# Patient Record
Sex: FEMALE | Race: WHITE | NOT HISPANIC OR LATINO | Employment: OTHER | URBAN - METROPOLITAN AREA
[De-identification: names, ages, dates, MRNs, and addresses within clinical notes are randomized per-mention and may not be internally consistent; named-entity substitution may affect disease eponyms.]

---

## 2017-01-10 RX ORDER — ESTRADIOL 2 MG/1
TABLET ORAL
Qty: 90 TABLET | Refills: 3 | Status: SHIPPED | OUTPATIENT
Start: 2017-01-10 | End: 2018-05-22 | Stop reason: SDUPTHER

## 2017-01-20 ENCOUNTER — PATIENT MESSAGE (OUTPATIENT)
Dept: ELECTROPHYSIOLOGY | Facility: CLINIC | Age: 67
End: 2017-01-20

## 2017-01-24 ENCOUNTER — OFFICE VISIT (OUTPATIENT)
Dept: ELECTROPHYSIOLOGY | Facility: CLINIC | Age: 67
End: 2017-01-24
Payer: MEDICARE

## 2017-01-24 VITALS
HEART RATE: 60 BPM | SYSTOLIC BLOOD PRESSURE: 108 MMHG | DIASTOLIC BLOOD PRESSURE: 72 MMHG | BODY MASS INDEX: 29.64 KG/M2 | HEIGHT: 68 IN | WEIGHT: 195.56 LBS

## 2017-01-24 DIAGNOSIS — R00.2 PALPITATIONS: ICD-10-CM

## 2017-01-24 DIAGNOSIS — I47.10 SVT (SUPRAVENTRICULAR TACHYCARDIA): Primary | ICD-10-CM

## 2017-01-24 PROCEDURE — 93010 ELECTROCARDIOGRAM REPORT: CPT | Mod: ,,, | Performed by: INTERNAL MEDICINE

## 2017-01-24 PROCEDURE — 99213 OFFICE O/P EST LOW 20 MIN: CPT | Mod: PBBFAC | Performed by: INTERNAL MEDICINE

## 2017-01-24 PROCEDURE — 99999 PR PBB SHADOW E&M-EST. PATIENT-LVL III: CPT | Mod: PBBFAC,,, | Performed by: INTERNAL MEDICINE

## 2017-01-24 PROCEDURE — 93005 ELECTROCARDIOGRAM TRACING: CPT | Mod: PBBFAC | Performed by: INTERNAL MEDICINE

## 2017-01-24 PROCEDURE — 99214 OFFICE O/P EST MOD 30 MIN: CPT | Mod: S$PBB,,, | Performed by: INTERNAL MEDICINE

## 2017-01-24 RX ORDER — ATENOLOL 50 MG/1
50 TABLET ORAL 2 TIMES DAILY
Qty: 120 TABLET | Refills: 3 | Status: SHIPPED | OUTPATIENT
Start: 2017-01-24 | End: 2018-02-20 | Stop reason: SDUPTHER

## 2017-01-24 NOTE — MR AVS SNAPSHOT
[facility]  [address]  [facility] [address]  Phone: [phone]  Fax: [phone]                  Viv De La Torre   2017 2:20 PM   Office Visit    Description:  Female : 1950   Provider:  Blaine Driver MD   Department:  [facility]           Reason for Visit     SVT           Diagnoses this Visit        Comments    SVT (supraventricular tachycardia)    -  Primary     Palpitations                To Do List           Goals (5 Years of Data)     None      Follow-Up and Disposition     Return in about 6 months (around 2017).    Follow-up and Disposition History       These Medications        Disp Refills Start End    atenolol (TENORMIN) 50 MG tablet 120 tablet 3 2017    Take 1 tablet (50 mg total) by mouth 2 (two) times daily. - Oral    Pharmacy: [address] #: [phone]         [facility] On Call     [facility] On Call Nurse Care Line -  Assistance  Registered nurses in the [facility] On Call Center provide clinical advisement, health education, appointment booking, and other advisory services.  Call for this free service at [phone].             Medications           Message regarding Medications     Verify the changes and/or additions to your medication regime listed below are the same as discussed with your clinician today.  If any of these changes or additions are incorrect, please notify your healthcare provider.        START taking these NEW medications        Refills    atenolol (TENORMIN) 50 MG tablet 3    Sig: Take 1 tablet (50 mg total) by mouth 2 (two) times daily.    Class: Normal    Route: Oral      STOP taking these medications     metoprolol tartrate (LOPRESSOR) 25 MG tablet Take 1 tablet (25 mg total) by mouth 2 (two) times daily.           Verify that the below list of medications is an accurate representation of the medications you are currently  "taking.  If none reported, the list may be blank. If incorrect, please contact your healthcare provider. Carry this list with you in case of emergency.           Current Medications     albuterol (PROAIR HFA) 90 mcg/actuation inhaler Inhale 2 puffs into the lungs every 6 (six) hours as needed for Wheezing.    azelastine (ASTELIN) 137 mcg (0.1 %) nasal spray 2 sprays (274 mcg total) by Nasal route 2 (two) times daily. 1 Aerosol, Spray Nasal Every day    cetirizine (ZYRTEC) 10 MG tablet Take 10 mg by mouth once daily.    diphenhydrAMINE-zinc acetate 2-0.1% (BENADRYL) cream Apply topically 3 (three) times daily as needed for Itching.    estradiol (ESTRACE) 2 MG tablet TAKE ONE TABLET BY MOUTH ONCE DAILY    levothyroxine (SYNTHROID) 25 MCG tablet Take 1 tablet (25 mcg total) by mouth before breakfast.    mupirocin (BACTROBAN) 2 % ointment Apply topically 3 (three) times daily.    pravastatin (PRAVACHOL) 40 MG tablet Take 1 tablet (40 mg total) by mouth once daily.    sertraline (ZOLOFT) 100 MG tablet Take 1 tablet (100 mg total) by mouth once daily. 1 Tablet Oral Every day    tretinoin microspheres (RETIN-A MICRO) 0.04 % gel Gel Topical Every day    atenolol (TENORMIN) 50 MG tablet Take 1 tablet (50 mg total) by mouth 2 (two) times daily.           Clinical Reference Information           Vital Signs - Last Recorded  Most recent update: 1/24/2017  2:29 PM by Jose Wilson MA    BP Pulse Ht Wt BMI    108/72 60 5' 8" (1.727 m) 88.7 kg (195 lb 8.8 oz) 29.73 kg/m2      Blood Pressure          Most Recent Value    BP  108/72      Allergies as of 1/24/2017     Penicillins      Immunizations Administered on Date of Encounter - 1/24/2017     None      "

## 2017-01-24 NOTE — PROGRESS NOTES
"Subjective:    Patient ID:  Viv De La Torre is a 67 y.o. female who presents for follow-up of SVT      HPI 66 yo female with SVT.  She is an organic ,  Reports history of intermittent palpitations, over the past 20 years. Associated with chest and back tightness. Generally would last 10-15 minutes. Can often break it with vagal maneuvers.   Presented to [facility] with sustained symptoms lasting 30 minutes. Symptoms resolved prior to being evaluated.  Spect stress 10/28/16 negative.  Echo 10/28/16 normal biventricular structure and function.  Had sustained symptoms >> event monitor reveals SVT at 180 bpm. This one was associated with dizziness and diaphoresis. Was in her car with her granddaughter, had to pull over.  We discussed options, she preferred medical therapy >> placed on lopressor 25 mg BID.  Had Influenza virus over the holidays.    Having breakthrough on the lopressor.  Not as severe, but having 4-5 times a day.  "They will stop me."      Review of Systems   Constitution: Negative. Negative for weakness and malaise/fatigue.   Cardiovascular: Positive for palpitations. Negative for chest pain, dyspnea on exertion, irregular heartbeat, leg swelling, near-syncope, orthopnea, paroxysmal nocturnal dyspnea and syncope.   Respiratory: Negative for cough and shortness of breath.    Neurological: Negative for dizziness and light-headedness.   All other systems reviewed and are negative.       Objective:    Physical Exam   Constitutional: She is oriented to person, place, and time. She appears well-developed and well-nourished.   Eyes: Conjunctivae are normal. No scleral icterus.   Neck: No JVD present. No tracheal deviation present.   Cardiovascular: Normal rate and regular rhythm.  PMI is not displaced.    Pulmonary/Chest: Effort normal and breath sounds normal. No respiratory distress.   Abdominal: Soft. There is no hepatosplenomegaly. There is no tenderness.   Musculoskeletal: She exhibits no edema or " tenderness.   Neurological: She is alert and oriented to person, place, and time.   Skin: Skin is warm and dry. No rash noted.   Psychiatric: She has a normal mood and affect. Her behavior is normal.         Assessment:       1. SVT (supraventricular tachycardia)    2. Palpitations         Plan:           Paroxysmal SVT, having breakthrough on lopressor 25 mg BID.  Limited by low blood pressure.  Discussed options, prefers to defer RFA.  Switch to Atenolol 50 mg BID.  If continues to have events, she will contact us >> switch to Cardizem.  If this does not work, return to clinic to discuss RFA vs anti-arrhythmic therapy.  I indicated I would endorse RFA.  F/u in 6 months.

## 2017-01-25 DIAGNOSIS — R00.2 PALPITATIONS: Primary | ICD-10-CM

## 2017-01-30 ENCOUNTER — PATIENT MESSAGE (OUTPATIENT)
Dept: PODIATRY | Facility: CLINIC | Age: 67
End: 2017-01-30

## 2017-01-31 ENCOUNTER — OFFICE VISIT (OUTPATIENT)
Dept: PODIATRY | Facility: CLINIC | Age: 67
End: 2017-01-31
Payer: MEDICARE

## 2017-01-31 ENCOUNTER — TELEPHONE (OUTPATIENT)
Dept: PODIATRY | Facility: CLINIC | Age: 67
End: 2017-01-31

## 2017-01-31 VITALS — HEIGHT: 68 IN | BODY MASS INDEX: 29.86 KG/M2 | WEIGHT: 197.06 LBS

## 2017-01-31 DIAGNOSIS — D36.10 NEUROMA: Primary | ICD-10-CM

## 2017-01-31 PROCEDURE — 64455 NJX AA&/STRD PLTR COM DG NRV: CPT | Mod: PBBFAC,PO

## 2017-01-31 PROCEDURE — 99999 PR PBB SHADOW E&M-EST. PATIENT-LVL II: CPT | Mod: PBBFAC,,,

## 2017-01-31 PROCEDURE — 99213 OFFICE O/P EST LOW 20 MIN: CPT | Mod: S$PBB,25,,

## 2017-01-31 PROCEDURE — 64455 NJX AA&/STRD PLTR COM DG NRV: CPT | Mod: S$PBB,,,

## 2017-01-31 PROCEDURE — 99212 OFFICE O/P EST SF 10 MIN: CPT | Mod: PBBFAC,PO

## 2017-01-31 RX ORDER — DEXAMETHASONE SODIUM PHOSPHATE 4 MG/ML
4 INJECTION, SOLUTION INTRA-ARTICULAR; INTRALESIONAL; INTRAMUSCULAR; INTRAVENOUS; SOFT TISSUE
Status: COMPLETED | OUTPATIENT
Start: 2017-01-31 | End: 2017-01-31

## 2017-01-31 RX ORDER — SERTRALINE HYDROCHLORIDE 100 MG/1
100 TABLET, FILM COATED ORAL DAILY
Qty: 30 TABLET | Refills: 12 | Status: SHIPPED | OUTPATIENT
Start: 2017-01-31 | End: 2019-11-05 | Stop reason: SDUPTHER

## 2017-01-31 RX ORDER — TRIAMCINOLONE ACETONIDE 40 MG/ML
12 INJECTION, SUSPENSION INTRA-ARTICULAR; INTRAMUSCULAR
Status: COMPLETED | OUTPATIENT
Start: 2017-01-31 | End: 2017-01-31

## 2017-01-31 RX ADMIN — TRIAMCINOLONE ACETONIDE 12 MG: 40 INJECTION, SUSPENSION INTRA-ARTICULAR; INTRAMUSCULAR at 03:01

## 2017-01-31 RX ADMIN — DEXAMETHASONE SODIUM PHOSPHATE 4 MG: 4 INJECTION, SOLUTION INTRAMUSCULAR; INTRAVENOUS at 03:01

## 2017-01-31 NOTE — TELEPHONE ENCOUNTER
----- Message from Hillary Veliz sent at 1/31/2017 12:57 PM CST -----  Contact: Patient  Patient is requesting a Rx refill for sertraline (ZOLOFT) 100 MG tablet    Please send Rx to [facility] [phone] (Phone)  [phone] (Fax)

## 2017-02-01 NOTE — PROGRESS NOTES
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 Previous response to steroid injection.  Wears orthotics.    Constitutional:  Patient is oriented to person, place, and time. Vital signs are normal.  Appears well-developed and well-nourished.      Vascular:  Dorsalis pedis pulses are 2+ on the right side, and 2+ on the left side.   Posterior tibial pulses are 2+ on the right side, and 2+ on the left side.   + digital hair growth, capillary fill time to all toes <3 seconds, no swelling    Skin/Dermatological:  Skin is warm, shiny and atrophic.  No cyanosis or clubbing. No rashes noted.  No open wounds.       Musculoskeletal:      Normal range of motion of bilateral pedal joints and bilateral ankle with knee flexed and extended, no deformity, tenderness or crepitus. No assymmetries noted. Muscle strength to tibialis anterior, extensor hallucis longus, extensor digitorum longus, peroneal muscles, flexor hallucis/digotorum longus, posterior tibial and gastrosoleal complex is 5/5.  Positive Nelson sign left third interspace with splaying of the digits.  There is no pain surrounding the ankle joint. No instability.    Neurological:  No deficits to sharp/dull, light touch or vibratory sensation.   Normal strength lower extremity muscles, normal tone without assymmetry   Reflex Scores:       Patellar reflexes are 2+ on the right side and 2+ on the left side.       Achilles reflexes are 2+ on the right side and 2+ on the left side.        Assessment/Plan:    #1 Left third interspace neuroma: Patient was injected with 12 mg of Kenalog, 4 mg of dexamethasone and 2 cc of 50-50 mix 0.5 %Marcaine plain and 1% Lidocaine plain to the left third interspace.  Patient reported complete resolution of her pain.  Continue orthotics with metatarsal pads.  We discussed possibly another injection or possibly removal.  She does not want surgery. Return to clinic prn.

## 2017-04-19 ENCOUNTER — OFFICE VISIT (OUTPATIENT)
Dept: OPTOMETRY | Facility: CLINIC | Age: 67
End: 2017-04-19
Payer: MEDICARE

## 2017-04-19 DIAGNOSIS — H10.501 BLEPHAROCONJUNCTIVITIS OF RIGHT EYE, UNSPECIFIED BLEPHAROCONJUNCTIVITIS TYPE: Primary | ICD-10-CM

## 2017-04-19 PROCEDURE — 99999 PR PBB SHADOW E&M-EST. PATIENT-LVL III: CPT | Mod: PBBFAC,,, | Performed by: OPTOMETRIST

## 2017-04-19 PROCEDURE — 99213 OFFICE O/P EST LOW 20 MIN: CPT | Mod: PBBFAC,PO | Performed by: OPTOMETRIST

## 2017-04-19 PROCEDURE — 92012 INTRM OPH EXAM EST PATIENT: CPT | Mod: S$PBB,,, | Performed by: OPTOMETRIST

## 2017-04-19 RX ORDER — NEOMYCIN SULFATE, POLYMYXIN B SULFATE AND DEXAMETHASONE 3.5; 10000; 1 MG/ML; [USP'U]/ML; MG/ML
1 SUSPENSION/ DROPS OPHTHALMIC 4 TIMES DAILY
Qty: 5 ML | Refills: 0 | Status: SHIPPED | OUTPATIENT
Start: 2017-04-19 | End: 2017-04-24

## 2017-04-19 NOTE — MR AVS SNAPSHOT
[facility]  [address] [facility]  [facility] [address]  Phone: [phone]  Fax: [phone]                  Viv De La Torre   2017 9:30 AM   Office Visit    Description:  Female : 1950   Provider:  NGOZI Lynn, OD   Department:  [facility] - [facility]           Reason for Visit     Eye Problem     Eye Pain           Diagnoses this Visit        Comments    Blepharoconjunctivitis of right eye, unspecified blepharoconjunctivitis type    -  Primary            To Do List           Goals (5 Years of Data)     None      Follow-Up and Disposition     Return if symptoms worsen or fail to improve.       These Medications        Disp Refills Start End    neomycin-polymyxin-dexamethasone (MAXITROL) 3.5mg/mL-10,000 unit/mL-0.1 % DrpS 5 mL 0 2017    Place 1 drop into the right eye 4 (four) times daily. - Right Eye    Pharmacy: [address] #: [phone]         [facility] On Call     [facility] On [facility] Nurse Care Line -  Assistance  Unless otherwise directed by your provider, please contact [facility], our nurse care line that is available for  assistance.     Registered nurses in the [facility] On Call Center provide: appointment scheduling, clinical advisement, health education, and other advisory services.  Call: [phone] (toll free)               Medications           Message regarding Medications     Verify the changes and/or additions to your medication regime listed below are the same as discussed with your clinician today.  If any of these changes or additions are incorrect, please notify your healthcare provider.        START taking these NEW medications        Refills    neomycin-polymyxin-dexamethasone (MAXITROL) 3.5mg/mL-10,000 unit/mL-0.1 % DrpS 0    Sig: Place 1 drop into the right eye 4 (four) times daily.    Class: Normal    Route: Right Eye           Verify that the below list of  medications is an accurate representation of the medications you are currently taking.  If none reported, the list may be blank. If incorrect, please contact your healthcare provider. Carry this list with you in case of emergency.           Current Medications     albuterol (PROAIR HFA) 90 mcg/actuation inhaler Inhale 2 puffs into the lungs every 6 (six) hours as needed for Wheezing.    atenolol (TENORMIN) 50 MG tablet Take 1 tablet (50 mg total) by mouth 2 (two) times daily.    azelastine (ASTELIN) 137 mcg (0.1 %) nasal spray 2 sprays (274 mcg total) by Nasal route 2 (two) times daily. 1 Aerosol, Spray Nasal Every day    cetirizine (ZYRTEC) 10 MG tablet Take 10 mg by mouth once daily.    diphenhydrAMINE-zinc acetate 2-0.1% (BENADRYL) cream Apply topically 3 (three) times daily as needed for Itching.    estradiol (ESTRACE) 2 MG tablet TAKE ONE TABLET BY MOUTH ONCE DAILY    levothyroxine (SYNTHROID) 25 MCG tablet Take 1 tablet (25 mcg total) by mouth before breakfast.    mupirocin (BACTROBAN) 2 % ointment Apply topically 3 (three) times daily.    neomycin-polymyxin-dexamethasone (MAXITROL) 3.5mg/mL-10,000 unit/mL-0.1 % DrpS Place 1 drop into the right eye 4 (four) times daily.    pravastatin (PRAVACHOL) 40 MG tablet Take 1 tablet (40 mg total) by mouth once daily.    sertraline (ZOLOFT) 100 MG tablet Take 1 tablet (100 mg total) by mouth once daily. 1 Tablet Oral Every day    tretinoin microspheres (RETIN-A MICRO) 0.04 % gel Gel Topical Every day           Clinical Reference Information           Allergies as of 4/19/2017     Penicillins      Immunizations Administered on Date of Encounter - 4/19/2017     None      Instructions    DRY EYES:  Use Over The Counter artificial tears as needed for dry eye symptoms.  Some common brands include:  Systane, Optive, and Refresh.  These drops can be used as frequently as desired, but may be most helpful use during long periods of concentrated work.  For example, reading /  "working at the computer.  Avoid drops that "get redness out", as these contain medication that may further irritate the eyes.    ALLERGY EYES / SYMPTOMS:    Over the counter medications include--Zaditor and Alaway  Use as directed 1-2 drops daily for symptoms of itching / watering eyes.  These drops will not help for dry eye or exposure symptoms.           Language Assistance Services     ATTENTION: Language assistance services are available, free of charge. Please call [phone].      ATENCIÓN: Si janette lorenzo, tiene a redman disposición servicios gratuitos de asistencia lingüística. Llame al [phone].     DINORA Ý: N?u b?n nói Ti?ng Vi?t, có các d?ch v? h? tr? ngôn ng? mi?n phí dành cho b?n. G?i s? [phone].         [facility] complies with applicable Federal civil rights laws and does not discriminate on the basis of race, color, national origin, age, disability, or sex.        "

## 2017-04-19 NOTE — PROGRESS NOTES
HPI     Eye Problem    Additional comments: pt states OD tearing all night, woke up this morning   with swelling & irritation // pt was around granddaughter with   conjunctivitis last week --- also has been using generic Latisse so not   sure if it's causing irritation           Eye Pain    Additional comments: tender           Comments   Agree above  Notes g daughter with pink eye last week  No current illness  Woke with swelling OD, tearing and crusting today   Last visit red eye, 9/15 was for hordeolum  Currently not tx this episode         Last edited by NGOZI Lynn, OD on 4/19/2017 10:06 AM. (History)            Assessment /Plan     For exam results, see Encounter Report.    Blepharoconjunctivitis of right eye, unspecified blepharoconjunctivitis type  -     neomycin-polymyxin-dexamethasone (MAXITROL) 3.5mg/mL-10,000 unit/mL-0.1 % DrpS; Place 1 drop into the right eye 4 (four) times daily.  Dispense: 5 mL; Refill: 0      Lid hygiene, gentle cleansing/ warm compress  maxitrol gtts as directed  Treat OS if involved  Call / message if worsening pain/ vision  Due to schedule for full exam / DFE

## 2017-05-08 ENCOUNTER — TELEPHONE (OUTPATIENT)
Dept: INFECTIOUS DISEASES | Facility: CLINIC | Age: 67
End: 2017-05-08

## 2017-05-08 NOTE — TELEPHONE ENCOUNTER
----- Message from Gifty Hatfield MA sent at 5/8/2017  9:53 AM CDT -----  Contact: Viv    Tel: [phone]  Appointment 05/16, please add labs   ----- Message -----     From: Stormy Panchal     Sent: 5/8/2017   9:49 AM       To: MEGAN Olmedo Dr.'s pt/  Pt. Says she is coming to see you on 5/16th at 9:30am.  Did you need her to have any testing done or labs before she comes in?  If so, pls call.

## 2017-05-16 ENCOUNTER — OFFICE VISIT (OUTPATIENT)
Dept: INFECTIOUS DISEASES | Facility: CLINIC | Age: 67
End: 2017-05-16
Payer: MEDICARE

## 2017-05-16 VITALS
BODY MASS INDEX: 29.64 KG/M2 | TEMPERATURE: 98 F | WEIGHT: 195.56 LBS | HEART RATE: 44 BPM | HEIGHT: 68 IN | DIASTOLIC BLOOD PRESSURE: 76 MMHG | SYSTOLIC BLOOD PRESSURE: 121 MMHG

## 2017-05-16 DIAGNOSIS — H81.09 MENIERE'S DISEASE IN REMISSION, UNSPECIFIED LATERALITY: Chronic | ICD-10-CM

## 2017-05-16 DIAGNOSIS — F32.A DEPRESSION, UNSPECIFIED DEPRESSION TYPE: Primary | ICD-10-CM

## 2017-05-16 DIAGNOSIS — R53.83 FATIGUE, UNSPECIFIED TYPE: ICD-10-CM

## 2017-05-16 DIAGNOSIS — Z12.31 ENCOUNTER FOR SCREENING MAMMOGRAM FOR MALIGNANT NEOPLASM OF BREAST: ICD-10-CM

## 2017-05-16 DIAGNOSIS — I47.10 SVT (SUPRAVENTRICULAR TACHYCARDIA): ICD-10-CM

## 2017-05-16 DIAGNOSIS — Z12.39 BREAST CANCER SCREENING: ICD-10-CM

## 2017-05-16 DIAGNOSIS — E03.4 HYPOTHYROIDISM DUE TO ACQUIRED ATROPHY OF THYROID: ICD-10-CM

## 2017-05-16 DIAGNOSIS — E78.00 HYPERCHOLESTEROLEMIA: Chronic | ICD-10-CM

## 2017-05-16 PROCEDURE — 99215 OFFICE O/P EST HI 40 MIN: CPT | Mod: S$PBB,,, | Performed by: INTERNAL MEDICINE

## 2017-05-16 PROCEDURE — 99999 PR PBB SHADOW E&M-EST. PATIENT-LVL III: CPT | Mod: PBBFAC,,, | Performed by: INTERNAL MEDICINE

## 2017-05-16 PROCEDURE — 99213 OFFICE O/P EST LOW 20 MIN: CPT | Mod: PBBFAC | Performed by: INTERNAL MEDICINE

## 2017-05-16 RX ORDER — CLOBETASOL PROPIONATE 0.5 MG/G
CREAM TOPICAL 2 TIMES DAILY
COMMUNITY
End: 2018-03-22 | Stop reason: SDUPTHER

## 2017-05-16 NOTE — MR AVS SNAPSHOT
[facility] - Infectious Diseases  [address]  [facility] [address]  Phone: [phone]  Fax: [phone]                  Viv De La Torre   2017 9:30 AM   Office Visit    Description:  Female : 1950   Provider:  Jero Hlae MD   Department:  [facility]           Reason for Visit     Annual Exam           Diagnoses this Visit        Comments    Depression, unspecified depression type    -  Primary     SVT (supraventricular tachycardia)         Hypercholesterolemia         Hypothyroidism due to acquired atrophy of thyroid         Breast cancer screening         Fatigue, unspecified type         Encounter for screening mammogram for malignant neoplasm of breast                To Do List           Future Appointments        Provider Department Dept Phone    2017 2:15 PM NS MAMMO1 [facility] [phone]    2017 9:00 AM LAB, [place] [facility] [phone]    2017 3:00 PM EKG, APPT [facility] - [phone]    2017 3:30 PM Kate Fowler, P [facility] - Arrhythmia [phone]      Goals (5 Years of Data)     None      [facility] On Call     [facility] On [facility] Nurse Care Line -  Assistance  Unless otherwise directed by your provider, please contact [facility], our nurse care line that is available for  assistance.     Registered nurses in the [facility] On Call Center provide: appointment scheduling, clinical advisement, health education, and other advisory services.  Call: [phone] (toll free)               Medications           Message regarding Medications     Verify the changes and/or additions to your medication regime listed below are the same as discussed with your clinician today.  If any of these changes or additions are incorrect, please notify your healthcare provider.             Verify that the below list of medications is an accurate representation of the medications you  "are currently taking.  If none reported, the list may be blank. If incorrect, please contact your healthcare provider. Carry this list with you in case of emergency.           Current Medications     albuterol (PROAIR HFA) 90 mcg/actuation inhaler Inhale 2 puffs into the lungs every 6 (six) hours as needed for Wheezing.    atenolol (TENORMIN) 50 MG tablet Take 1 tablet (50 mg total) by mouth 2 (two) times daily.    azelastine (ASTELIN) 137 mcg (0.1 %) nasal spray 2 sprays (274 mcg total) by Nasal route 2 (two) times daily. 1 Aerosol, Spray Nasal Every day    cetirizine (ZYRTEC) 10 MG tablet Take 10 mg by mouth once daily.    clobetasol (TEMOVATE) 0.05 % cream Apply topically 2 (two) times daily.    diphenhydrAMINE-zinc acetate 2-0.1% (BENADRYL) cream Apply topically 3 (three) times daily as needed for Itching.    estradiol (ESTRACE) 2 MG tablet TAKE ONE TABLET BY MOUTH ONCE DAILY    levothyroxine (SYNTHROID) 25 MCG tablet Take 1 tablet (25 mcg total) by mouth before breakfast.    mupirocin (BACTROBAN) 2 % ointment Apply topically 3 (three) times daily.    pravastatin (PRAVACHOL) 40 MG tablet Take 1 tablet (40 mg total) by mouth once daily.    sertraline (ZOLOFT) 100 MG tablet Take 1 tablet (100 mg total) by mouth once daily. 1 Tablet Oral Every day    tretinoin microspheres (RETIN-A MICRO) 0.04 % gel Gel Topical Every day           Clinical Reference Information           Your Vitals Were     BP Pulse Temp Height Weight BMI    121/76 44 97.9 °F (36.6 °C) (Oral) 5' 8" (1.727 m) 88.7 kg (195 lb 8.8 oz) 29.73 kg/m2      Blood Pressure          Most Recent Value    BP  121/76      Allergies as of 5/16/2017     Penicillins      Immunizations Administered on Date of Encounter - 5/16/2017     None      Orders Placed During Today's Visit     Future Labs/Procedures Expected by Expires    Mammo Digital Screening Bilat with CAD  5/16/2017 7/14/2018    Sedimentation rate, manual  5/16/2017 7/15/2018    TSH  5/16/2017 7/15/2018 "    CBC auto differential  As directed 5/16/2018    Comprehensive metabolic panel  As directed 5/16/2018    Lipid panel  As directed 5/16/2018      Language Assistance Services     ATTENTION: Language assistance services are available, free of charge. Please call [phone].      ATENCIÓN: Si habla lorenzo, tiene a redman disposición servicios gratuitos de asistencia lingüística. Llame al [phone].     CHÚ Ý: N?u b?n nói Ti?ng Vi?t, có các d?ch v? h? tr? ngôn ng? mi?n phí dành cho b?n. G?i s? [phone].         [facility] complies with applicable Federal civil rights laws and does not discriminate on the basis of race, color, national origin, age, disability, or sex.

## 2017-05-16 NOTE — ASSESSMENT & PLAN NOTE
She is on pravastatin 40 mg daily. Most recent past lipids:  Lab Results   Component Value Date    CHOL 208 (H) 10/28/2016    CHOL 236 (H) 04/17/2015    CHOL 229 (H) 10/22/2012     Lab Results   Component Value Date    HDL 54 10/28/2016    HDL 45 04/17/2015    HDL 60 10/22/2012     Lab Results   Component Value Date    LDLCALC 106.0 10/28/2016    LDLCALC 163.6 (H) 04/17/2015    LDLCALC 135.0 10/22/2012     Lab Results   Component Value Date    TRIG 240 (H) 10/28/2016    TRIG 137 04/17/2015    TRIG 168 (H) 10/22/2012     Lab Results   Component Value Date    CHOLHDL 26.0 10/28/2016    CHOLHDL 19.1 (L) 04/17/2015    CHOLHDL 26.2 10/22/2012     A new lipid profile has been ordered to update.  Coronary calcium score in 2012 was 0.

## 2017-05-16 NOTE — ASSESSMENT & PLAN NOTE
Pt had admission to [facility] for episode of SVT. She has been seen by Dr. Blaine Driver who has placed her on atenolol and since being on this, she has had no further episodes of tachycardia.

## 2017-05-16 NOTE — PROGRESS NOTES
Subjective:      Patient ID: Viv De La Torre is a 67 y.o. female.    Chief Complaint:Annual Exam      History of Present Illness    SVT (supraventricular tachycardia)  Pt had admission to [facility] for episode of SVT. She has been seen by Dr. Blaine Driver who has placed her on atenolol and since being on this, she has had no further episodes of tachycardia.    Meniere's disease   Still has occasional episodes of vertigo and she takes a diuretic which seems to help    Hypothyroidism due to acquired atrophy of thyroid  Pt has had elevated TSH in past and was placed on thyroxine. TSH pending    Hypercholesterolemia   She is on pravastatin 40 mg daily. Most recent past lipids:  Lab Results   Component Value Date    CHOL 208 (H) 10/28/2016    CHOL 236 (H) 04/17/2015    CHOL 229 (H) 10/22/2012     Lab Results   Component Value Date    HDL 54 10/28/2016    HDL 45 04/17/2015    HDL 60 10/22/2012     Lab Results   Component Value Date    LDLCALC 106.0 10/28/2016    LDLCALC 163.6 (H) 04/17/2015    LDLCALC 135.0 10/22/2012     Lab Results   Component Value Date    TRIG 240 (H) 10/28/2016    TRIG 137 04/17/2015    TRIG 168 (H) 10/22/2012     Lab Results   Component Value Date    CHOLHDL 26.0 10/28/2016    CHOLHDL 19.1 (L) 04/17/2015    CHOLHDL 26.2 10/22/2012     A new lipid profile has been ordered to update.  Coronary calcium score in 2012 was 0.    Pain in dorsum of left foot  Has pain and burning in top of left foot intermittently. 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 Advised her to discuss this with her podiatrist.    Review of Systems   Constitution: Negative for chills, decreased appetite, fever, weakness, malaise/fatigue, night sweats, weight gain and weight loss.   HENT: Negative for congestion, ear pain, headaches, hearing loss, hoarse voice, sore throat and tinnitus.    Eyes: Negative for blurred vision, redness and visual disturbance.   Cardiovascular: Negative for chest pain, leg swelling and palpitations.    Respiratory: Negative for cough, hemoptysis, shortness of breath and sputum production.    Hematologic/Lymphatic: Negative for adenopathy. Does not bruise/bleed easily.   Skin: Negative for dry skin, itching, rash and suspicious lesions.   Musculoskeletal: Negative for back pain, joint pain, myalgias and neck pain.   Gastrointestinal: Negative for abdominal pain, constipation, diarrhea, heartburn, nausea and vomiting.   Genitourinary: Negative for dysuria, flank pain, frequency, hematuria, hesitancy and urgency.   Neurological: Negative for dizziness, numbness and paresthesias.   Psychiatric/Behavioral: Negative for depression and memory loss. The patient does not have insomnia and is not nervous/anxious.      Objective:   Physical Exam   Constitutional: She is oriented to person, place, and time. She appears well-developed and well-nourished.   HENT:   Right Ear: External ear normal.   Left Ear: External ear normal.   Mouth/Throat: Oropharynx is clear and moist.   Neck: Carotid bruit is not present. No thyroid mass and no thyromegaly present.   Cardiovascular: Normal rate, regular rhythm and normal heart sounds.  Exam reveals no gallop and no friction rub.    No murmur heard.  Pulmonary/Chest: Effort normal and breath sounds normal. No respiratory distress. She has no wheezes. She has no rales.   Abdominal: Soft. Bowel sounds are normal. She exhibits no distension and no mass. There is no splenomegaly or hepatomegaly. There is no tenderness.   Musculoskeletal: She exhibits no edema or tenderness.   Lymphadenopathy:     She has no cervical adenopathy.   Neurological: She is alert and oriented to person, place, and time.   Skin: No rash noted. No erythema.   Psychiatric: She has a normal mood and affect.   Vitals reviewed.    Assessment:       1. Depression, unspecified depression type    2. SVT (supraventricular tachycardia)    3. Hypercholesterolemia    4. Hypothyroidism due to acquired atrophy of thyroid    5.  Breast cancer screening    6. Fatigue, unspecified type    7. Encounter for screening mammogram for malignant neoplasm of breast     8. Meniere's disease          Plan:        schedule mammogram and labs to be done in [facility]

## 2017-05-17 ENCOUNTER — HOSPITAL ENCOUNTER (OUTPATIENT)
Dept: RADIOLOGY | Facility: HOSPITAL | Age: 67
Discharge: HOME | End: 2017-05-17
Attending: INTERNAL MEDICINE
Payer: MEDICARE

## 2017-05-17 DIAGNOSIS — Z12.31 ENCOUNTER FOR SCREENING MAMMOGRAM FOR MALIGNANT NEOPLASM OF BREAST: ICD-10-CM

## 2017-05-17 DIAGNOSIS — Z12.39 BREAST CANCER SCREENING: ICD-10-CM

## 2017-05-17 PROCEDURE — 77067 SCR MAMMO BI INCL CAD: CPT | Mod: 26,,, | Performed by: RADIOLOGY

## 2017-05-17 PROCEDURE — 77063 BREAST TOMOSYNTHESIS BI: CPT | Mod: 26,,, | Performed by: RADIOLOGY

## 2017-05-17 PROCEDURE — 77067 SCR MAMMO BI INCL CAD: CPT | Mod: TC

## 2017-05-18 ENCOUNTER — LAB VISIT (OUTPATIENT)
Dept: LAB | Facility: HOSPITAL | Age: 67
End: 2017-05-18
Attending: INTERNAL MEDICINE
Payer: MEDICARE

## 2017-05-18 DIAGNOSIS — R53.83 FATIGUE, UNSPECIFIED TYPE: ICD-10-CM

## 2017-05-18 DIAGNOSIS — E78.00 HYPERCHOLESTEROLEMIA: Chronic | ICD-10-CM

## 2017-05-18 DIAGNOSIS — E03.4 HYPOTHYROIDISM DUE TO ACQUIRED ATROPHY OF THYROID: ICD-10-CM

## 2017-05-18 LAB
ALBUMIN SERPL BCP-MCNC: 3.5 G/DL
ALP SERPL-CCNC: 68 U/L
ALT SERPL W/O P-5'-P-CCNC: 49 U/L
ANION GAP SERPL CALC-SCNC: 5 MMOL/L
AST SERPL-CCNC: 34 U/L
BASOPHILS # BLD AUTO: 0.03 K/UL
BASOPHILS NFR BLD: 0.6 %
BILIRUB SERPL-MCNC: 0.4 MG/DL
BUN SERPL-MCNC: 15 MG/DL
CALCIUM SERPL-MCNC: 9.5 MG/DL
CHLORIDE SERPL-SCNC: 105 MMOL/L
CHOLEST/HDLC SERPL: 4.9 {RATIO}
CO2 SERPL-SCNC: 28 MMOL/L
CREAT SERPL-MCNC: 0.9 MG/DL
DIFFERENTIAL METHOD: ABNORMAL
EOSINOPHIL # BLD AUTO: 0.1 K/UL
EOSINOPHIL NFR BLD: 2.3 %
ERYTHROCYTE [DISTWIDTH] IN BLOOD BY AUTOMATED COUNT: 12.6 %
ERYTHROCYTE [SEDIMENTATION RATE] IN BLOOD BY WESTERGREN METHOD: 10 MM/HR
EST. GFR  (AFRICAN AMERICAN): >60 ML/MIN/1.73 M^2
EST. GFR  (NON AFRICAN AMERICAN): >60 ML/MIN/1.73 M^2
GLUCOSE SERPL-MCNC: 99 MG/DL
HCT VFR BLD AUTO: 40.9 %
HDL/CHOLESTEROL RATIO: 20.3 %
HDLC SERPL-MCNC: 241 MG/DL
HDLC SERPL-MCNC: 49 MG/DL
HGB BLD-MCNC: 13.8 G/DL
LDLC SERPL CALC-MCNC: 156.2 MG/DL
LYMPHOCYTES # BLD AUTO: 2.4 K/UL
LYMPHOCYTES NFR BLD: 45.7 %
MCH RBC QN AUTO: 31.6 PG
MCHC RBC AUTO-ENTMCNC: 33.7 %
MCV RBC AUTO: 94 FL
MONOCYTES # BLD AUTO: 0.4 K/UL
MONOCYTES NFR BLD: 8.1 %
NEUTROPHILS # BLD AUTO: 2.3 K/UL
NEUTROPHILS NFR BLD: 43.1 %
NONHDLC SERPL-MCNC: 192 MG/DL
PLATELET # BLD AUTO: 294 K/UL
PMV BLD AUTO: 10.7 FL
POTASSIUM SERPL-SCNC: 4.1 MMOL/L
PROT SERPL-MCNC: 7.2 G/DL
RBC # BLD AUTO: 4.37 M/UL
SODIUM SERPL-SCNC: 138 MMOL/L
TRIGL SERPL-MCNC: 179 MG/DL
TSH SERPL DL<=0.005 MIU/L-ACNC: 3.01 UIU/ML
WBC # BLD AUTO: 5.21 K/UL

## 2017-05-18 PROCEDURE — 84443 ASSAY THYROID STIM HORMONE: CPT

## 2017-05-18 PROCEDURE — 80061 LIPID PANEL: CPT

## 2017-05-18 PROCEDURE — 85651 RBC SED RATE NONAUTOMATED: CPT | Mod: PO

## 2017-05-18 PROCEDURE — 36415 COLL VENOUS BLD VENIPUNCTURE: CPT | Mod: PO

## 2017-05-18 PROCEDURE — 80053 COMPREHEN METABOLIC PANEL: CPT

## 2017-05-18 PROCEDURE — 85025 COMPLETE CBC W/AUTO DIFF WBC: CPT

## 2017-05-31 DIAGNOSIS — Z78.9 ALLERGY HISTORY UNKNOWN: ICD-10-CM

## 2017-05-31 NOTE — TELEPHONE ENCOUNTER
----- Message from Trina Juan sent at 5/31/2017  3:53 PM CDT -----  Contact: Pt: [phone]  PT CALLED and stated that she forgot to get a refill on her inhaler.  Pt needs albuterol (PROAIR HFA) 90 mcg/actuation inhaler.    Pharmacy used is [address] [place] [phone] (phone) [phone] (fax).    Pt can be reached at [phone]  Thanks

## 2017-06-01 RX ORDER — ALBUTEROL SULFATE 90 UG/1
2 AEROSOL, METERED RESPIRATORY (INHALATION) EVERY 6 HOURS PRN
Qty: 3 INHALER | Refills: 4 | Status: SHIPPED | OUTPATIENT
Start: 2017-06-01 | End: 2018-11-07 | Stop reason: SDUPTHER

## 2017-06-07 ENCOUNTER — PATIENT MESSAGE (OUTPATIENT)
Dept: INFECTIOUS DISEASES | Facility: CLINIC | Age: 67
End: 2017-06-07

## 2017-07-24 ENCOUNTER — TELEPHONE (OUTPATIENT)
Dept: ELECTROPHYSIOLOGY | Facility: CLINIC | Age: 67
End: 2017-07-24

## 2017-07-24 DIAGNOSIS — I47.10 SVT (SUPRAVENTRICULAR TACHYCARDIA): Primary | ICD-10-CM

## 2017-07-24 DIAGNOSIS — M79.642 LEFT HAND PAIN: Primary | ICD-10-CM

## 2017-07-24 DIAGNOSIS — I49.3 PVC'S (PREMATURE VENTRICULAR CONTRACTIONS): Primary | ICD-10-CM

## 2017-07-25 ENCOUNTER — HOSPITAL ENCOUNTER (OUTPATIENT)
Dept: RADIOLOGY | Facility: HOSPITAL | Age: 67
Discharge: HOME | End: 2017-07-25
Attending: ORTHOPAEDIC SURGERY
Payer: MEDICARE

## 2017-07-25 ENCOUNTER — OFFICE VISIT (OUTPATIENT)
Dept: ORTHOPEDICS | Facility: CLINIC | Age: 67
End: 2017-07-25
Payer: MEDICARE

## 2017-07-25 ENCOUNTER — TELEPHONE (OUTPATIENT)
Dept: ELECTROPHYSIOLOGY | Facility: CLINIC | Age: 67
End: 2017-07-25

## 2017-07-25 VITALS
WEIGHT: 195 LBS | DIASTOLIC BLOOD PRESSURE: 76 MMHG | HEART RATE: 44 BPM | HEIGHT: 68 IN | SYSTOLIC BLOOD PRESSURE: 132 MMHG | BODY MASS INDEX: 29.55 KG/M2

## 2017-07-25 DIAGNOSIS — M79.642 LEFT HAND PAIN: ICD-10-CM

## 2017-07-25 DIAGNOSIS — M79.645 FINGER PAIN, LEFT: ICD-10-CM

## 2017-07-25 DIAGNOSIS — M65.342 TRIGGER FINGER, LEFT RING FINGER: Primary | ICD-10-CM

## 2017-07-25 DIAGNOSIS — M79.645 FINGER PAIN, LEFT: Primary | ICD-10-CM

## 2017-07-25 PROCEDURE — 20550 NJX 1 TENDON SHEATH/LIGAMENT: CPT | Mod: PBBFAC,PO | Performed by: ORTHOPAEDIC SURGERY

## 2017-07-25 PROCEDURE — 99213 OFFICE O/P EST LOW 20 MIN: CPT | Mod: PBBFAC,25,PO | Performed by: ORTHOPAEDIC SURGERY

## 2017-07-25 PROCEDURE — 1159F MED LIST DOCD IN RCRD: CPT | Mod: ,,, | Performed by: ORTHOPAEDIC SURGERY

## 2017-07-25 PROCEDURE — 99999 PR PBB SHADOW E&M-EST. PATIENT-LVL III: CPT | Mod: PBBFAC,,, | Performed by: ORTHOPAEDIC SURGERY

## 2017-07-25 PROCEDURE — 73130 X-RAY EXAM OF HAND: CPT | Mod: 26,59,LT, | Performed by: RADIOLOGY

## 2017-07-25 PROCEDURE — 73140 X-RAY EXAM OF FINGER(S): CPT | Mod: 26,59,, | Performed by: RADIOLOGY

## 2017-07-25 PROCEDURE — 1126F AMNT PAIN NOTED NONE PRSNT: CPT | Mod: ,,, | Performed by: ORTHOPAEDIC SURGERY

## 2017-07-25 PROCEDURE — 99213 OFFICE O/P EST LOW 20 MIN: CPT | Mod: S$PBB,25,, | Performed by: ORTHOPAEDIC SURGERY

## 2017-07-25 RX ORDER — TRIAMCINOLONE ACETONIDE 40 MG/ML
40 INJECTION, SUSPENSION INTRA-ARTICULAR; INTRAMUSCULAR
Status: DISCONTINUED | OUTPATIENT
Start: 2017-07-25 | End: 2017-07-25 | Stop reason: HOSPADM

## 2017-07-25 RX ADMIN — TRIAMCINOLONE ACETONIDE 40 MG: 40 INJECTION, SUSPENSION INTRA-ARTICULAR; INTRAMUSCULAR at 09:07

## 2017-07-25 NOTE — PROGRESS NOTES
Subjective:          Chief Complaint: Viv De La Torre is a 67 y.o. female who had concerns including Pain of the Left Hand.    Mrs. De La Torre presents with left finger pain and catching that has been occurring for the last two months. She states that she was doing some increased gardening prior to symptoms beginning. The pain is increased when the finger triggers.      Hand Injury    The pain is present in the left hand. This is a chronic problem. The current episode started 1 to 4 weeks ago. There has been no history of extremity trauma. The injury was the result of a action while at home. The problem occurs constantly. The problem has been gradually worsening. The quality of the pain is described as aching, sharp, tightness, tight and throbbing. The pain is at a severity of 5/10. Associated symptoms include joint locking, a limited range of motion and stiffness. Pertinent negatives include no fever, inability to bear weight, itching, numbness or tingling. The symptoms are aggravated by bending, exercise, extension and flexion. She has tried brace/corset for the symptoms. The treatment provided mild relief. Physical therapy was not tried.        Past Medical History:   Diagnosis Date    AK (actinic keratosis)     Allergy     Seasonal    Anxiety     Arthritis of both knees 4/22/2015    Depression     Dizziness     Nuclear sclerosis - Both Eyes 5/6/2013    Osteoarthritis 2012    [facility]       Past Surgical History:   Procedure Laterality Date    ANKLE FRACTURE SURGERY  2013    Taiwo Macias MD    COLONOSCOPY  08/21/03    ELBOW FRACTURE SURGERY  2004    Lang Daniels MD    ELBOW SURGERY  2004    Lang Daniels MD    fx arm      HAND SURGERY      4th grade    HUMERUS FRACTURE SURGERY  2004    Lang Daniels MD    HYSTERECTOMY         Family History   Problem Relation Age of Onset    Cancer Mother      lung cancer    Cancer Father      renal cancer    Heart disease Sister     Hypertension Sister     Heart disease  Brother     Hypertension Brother     Cataracts Maternal Grandmother     Cancer Sister     Melanoma Neg Hx     Skin cancer Neg Hx     Amblyopia Neg Hx     Blindness Neg Hx     Diabetes Neg Hx     Glaucoma Neg Hx     Macular degeneration Neg Hx     Retinal detachment Neg Hx     Strabismus Neg Hx     Stroke Neg Hx     Thyroid disease Neg Hx          Current Outpatient Prescriptions:     albuterol (PROAIR HFA) 90 mcg/actuation inhaler, Inhale 2 puffs into the lungs every 6 (six) hours as needed for Wheezing., Disp: 3 Inhaler, Rfl: 4    atenolol (TENORMIN) 50 MG tablet, Take 1 tablet (50 mg total) by mouth 2 (two) times daily., Disp: 120 tablet, Rfl: 3    azelastine (ASTELIN) 137 mcg (0.1 %) nasal spray, 2 sprays (274 mcg total) by Nasal route 2 (two) times daily. 1 Aerosol, Spray Nasal Every day, Disp: 30 mL, Rfl: 6    cetirizine (ZYRTEC) 10 MG tablet, Take 10 mg by mouth once daily., Disp: , Rfl:     clobetasol (TEMOVATE) 0.05 % cream, Apply topically 2 (two) times daily., Disp: , Rfl:     diphenhydrAMINE-zinc acetate 2-0.1% (BENADRYL) cream, Apply topically 3 (three) times daily as needed for Itching., Disp: , Rfl:     estradiol (ESTRACE) 2 MG tablet, TAKE ONE TABLET BY MOUTH ONCE DAILY, Disp: 90 tablet, Rfl: 3    levothyroxine (SYNTHROID) 25 MCG tablet, Take 1 tablet (25 mcg total) by mouth before breakfast., Disp: 30 tablet, Rfl: 11    mupirocin (BACTROBAN) 2 % ointment, Apply topically 3 (three) times daily., Disp: , Rfl:     pravastatin (PRAVACHOL) 40 MG tablet, Take 1 tablet (40 mg total) by mouth once daily., Disp: 90 tablet, Rfl: 3    sertraline (ZOLOFT) 100 MG tablet, Take 1 tablet (100 mg total) by mouth once daily. 1 Tablet Oral Every day, Disp: 30 tablet, Rfl: 12    tretinoin microspheres (RETIN-A MICRO) 0.04 % gel, Gel Topical Every day, Disp: 90 g, Rfl: 3    Review of patient's allergies indicates:   Allergen Reactions    Penicillins Other (See Comments)     Other reaction(s):  Unknown       Vitals:    07/25/17 1144   BP: 132/76   Pulse: (!) 44       Review of Systems   Constitution: Negative for diaphoresis, fever and weakness.   HENT: Negative for ear pain, headaches, hearing loss, nosebleeds and tinnitus.    Eyes: Negative for pain, redness and visual disturbance.   Cardiovascular: Negative for chest pain and palpitations.   Respiratory: Negative for cough and shortness of breath.    Skin: Negative for itching and rash.   Musculoskeletal: Positive for joint pain and stiffness. Negative for back pain, joint swelling and myalgias.   Gastrointestinal: Negative for constipation, diarrhea, nausea and vomiting.   Genitourinary: Negative for dysuria, frequency and hematuria.   Neurological: Negative for dizziness, numbness, seizures and tingling.   Psychiatric/Behavioral: The patient is not nervous/anxious.    Allergic/Immunologic: Negative for environmental allergies.   All other systems reviewed and are negative.                  Objective:        General: Viv is well-developed, well-nourished, appears stated age, in no acute distress, alert and oriented to time, place and person.     General    Vitals reviewed.  Constitutional: She is oriented to person, place, and time. She appears well-developed and well-nourished. No distress.   HENT:   Head: Normocephalic and atraumatic.   Nose: Nose normal.   Eyes: Pupils are equal, round, and reactive to light.   Cardiovascular: Normal rate.    Pulmonary/Chest: Effort normal.   Neurological: She is alert and oriented to person, place, and time.   Psychiatric: She has a normal mood and affect. Her behavior is normal. Judgment and thought content normal.             Right Hand/Wrist Exam   Right hand exam is normal.      Left Hand/Wrist Exam     Inspection   Scars: Wrist - absent Hand -  absent  Effusion: Wrist - absent Hand -  absent  Bruising: Wrist - absent Hand -  absent  Deformity: Wrist - absent Hand -  absent    Pain   Hand - The patient  exhibits pain of the ring MCP.    Tenderness   The patient is tender to palpation of the cooley area.     Tests   Phalens Sign: negative  Tinels Sign (Medial Nerve): negative  Finkelstein: negative  Carpal Tunnel Compression Test: negative  Cubital Tunnel Compression Test: negative    Atrophy  Thenar:  Negative  Hypothenar:  negative  Intrinsic: negative  1st Dorsal Interosseous:  negative    Other     Sensory Exam  Median Distribution: normal  Ulnar Distribution: normal  Radial Distribution: normal    Comments:  Palpable nodule that is TTP over volar ring finger MCP; triggering on exam today as well      Left Elbow Exam     Tests Tinel's Sign (cubital tunnel): negative        Muscle Strength   Left Upper Extremity  Wrist Extension: 5/5/5   Wrist Flexion: 5/5/5   :  5/5/5   Index Finger: 5/5  Middle Finger: 5/5  Ring Finger: 5/5  Little Finger: 5/5  Thumb - APB: 5/5  Thumb - FPL: 5/5  Pinch Mechanism: 5/5    Vascular Exam       Capillary Refill  Left Hand: normal capillary refill        Current and previous radiographic studies and results were reviewed with the patient:   Three views of the hand were obtained (PA, oblique and lateral).  3 views left hand and 2 views of the left fourth finger demonstrate joint space narrowing of the DIP joints diffusely and to a lesser extent throughout the thumb.  There is a subtle soft tissue calcification along the dorsum of the proximal phalanx of the fourth finger.  This raises the question of a soft tissue calcification or prior avulsion injury at this site.  Views of the hand and left fourth finger otherwise unremarkable.      Assessment:       Encounter Diagnoses   Name Primary?    Left hand pain     Finger pain, left     Trigger finger, left ring finger Yes          Plan:         Left ring finger injection  F/U with me as needed                  Answers for HPI/ROS submitted by the patient on 7/20/2017   Hand injury  unexpected weight change: No  appetite change :  No  sleep disturbance: No  IMMUNOCOMPROMISED: No  dysphoric mood: No  sinus pressure : No  food allergies: No  difficulty urinating: No  painful intercourse: No  blood in stool: No  Radiating Pain: No

## 2017-07-25 NOTE — PATIENT INSTRUCTIONS
What is Trigger Finger?  Trigger finger is an inflammation of tissue inside your finger or thumb. It is also called tenosynovitis (ten-oh-sin-oh-VY-tis). Tendons (cordlike fibers that attach muscle to bone and allow you to bend the joints) become swollen. So does the synovium (a slick membrane that allows the tendons to move easily). This makes it difficult to straighten the finger or thumb.    Causes  Repeated use of a tool with strong gripping, such as a drill or wrench, can irritate and inflame the tendons and the synovium. It is also more common in certain medical conditions such as rheumatoid arthritis, gout, and diabetes. But often the cause of trigger finger is unknown.  Inside your finger  Tendons connect muscles in your forearm to the bones in your fingers. The tendons in each finger are surrounded by a protective tendon sheath. This sheath is lined with synovium, which produces a fluid that allows the tendons to slide easily when you bend and straighten the finger. If a tendon is irritated, it becomes inflamed.  When a tendon is inflamed  When a tendon is inflamed, it causes the lining of the tendon sheath to swell and thicken. Or the tendon itself may thicken. Then the sheath pinches the tendon, and the tendon can no longer slide easily inside the sheath. When you straighten your finger, the tendon sticks or locks as it tries to squeeze back through the sheath.    Symptoms  The first sign of trigger finger may be pain where the finger or thumb joins the palm. You may also notice some swelling. As the tendon becomes more inflamed, the finger may start to catch when you try to straighten it. When the locked tendon releases, the finger jumps, as if you were releasing the trigger of a gun. This further irritates the tendon, and may set up a cycle of catching and swelling.   Date Last Reviewed: 9/28/2015  © 3631-0958 [employer]. [address] [place] [address], [place] [address]. All rights reserved.  This information is not intended as a substitute for professional medical care. Always follow your healthcare professional's instructions.        Treating Trigger Finger     The tendon sheath is opened to release the tendon. Once the tendon can move freely again, the finger can bend and straighten more normally.     Trigger finger occurs when the tissue inside your finger or thumb becomes inflamed. Mild cases can be treated without surgery. If the problem is severe, surgery may be needed. Your doctor will discuss your options with you.  Nonsurgical treatment  For mild symptoms, your doctor may have you rest the finger or thumb. You may also be told to take anti-inflammatory medicines. These include ibuprofen or aspirin. You may be given an injection of medicine in the base of the finger or thumb. This typically is a steroid, such as cortisone.  Surgery  If nonsurgical treatments dont ease your symptoms, surgery may be recommended. A tendon is a cordlike fiber that attaches muscle to bone and allows joints to bend. The tendon is surrounded by a protective cover called a sheath. During surgery, the sheath in your finger or thumb is opened to enlarge the space and release the swollen tendon. This allows the finger or thumb to bend and straighten normally. Surgery takes about 20 minutes. It can often be done using a local anesthetic. You may be able to go home the same day. Your hand will be wrapped in a soft bandage. You may need to wear a plaster splint for a short time to keep the finger or thumb still as it heals. The stitches will be removed in about 2 weeks. Your doctor can discuss the risks and benefits of surgery with you.  Date Last Reviewed: 9/21/2015 © 2000-2016 [employer]. [address] [place] [address]. All rights reserved. This information is not intended as a substitute for professional medical care. Always follow your healthcare professional's instructions.

## 2017-07-26 ENCOUNTER — OFFICE VISIT (OUTPATIENT)
Dept: ELECTROPHYSIOLOGY | Facility: CLINIC | Age: 67
End: 2017-07-26
Payer: MEDICARE

## 2017-07-26 ENCOUNTER — HOSPITAL ENCOUNTER (OUTPATIENT)
Dept: CARDIOLOGY | Facility: CLINIC | Age: 67
Discharge: HOME | End: 2017-07-26
Payer: MEDICARE

## 2017-07-26 VITALS
HEIGHT: 67 IN | SYSTOLIC BLOOD PRESSURE: 118 MMHG | WEIGHT: 191.81 LBS | BODY MASS INDEX: 30.1 KG/M2 | DIASTOLIC BLOOD PRESSURE: 80 MMHG | HEART RATE: 63 BPM

## 2017-07-26 DIAGNOSIS — I47.10 SVT (SUPRAVENTRICULAR TACHYCARDIA): ICD-10-CM

## 2017-07-26 DIAGNOSIS — I49.3 PVC (PREMATURE VENTRICULAR CONTRACTION): ICD-10-CM

## 2017-07-26 DIAGNOSIS — E66.9 OBESITY, CLASS I, BMI 30-34.9: Chronic | ICD-10-CM

## 2017-07-26 DIAGNOSIS — E78.00 HYPERCHOLESTEROLEMIA: Chronic | ICD-10-CM

## 2017-07-26 DIAGNOSIS — H81.09 MENIERE'S DISEASE IN REMISSION, UNSPECIFIED LATERALITY: Chronic | ICD-10-CM

## 2017-07-26 DIAGNOSIS — E03.4 HYPOTHYROIDISM DUE TO ACQUIRED ATROPHY OF THYROID: ICD-10-CM

## 2017-07-26 DIAGNOSIS — I47.10 SVT (SUPRAVENTRICULAR TACHYCARDIA): Primary | ICD-10-CM

## 2017-07-26 PROCEDURE — 1159F MED LIST DOCD IN RCRD: CPT | Mod: ,,, | Performed by: NURSE PRACTITIONER

## 2017-07-26 PROCEDURE — 1126F AMNT PAIN NOTED NONE PRSNT: CPT | Mod: ,,, | Performed by: NURSE PRACTITIONER

## 2017-07-26 PROCEDURE — 99213 OFFICE O/P EST LOW 20 MIN: CPT | Mod: PBBFAC,25 | Performed by: NURSE PRACTITIONER

## 2017-07-26 PROCEDURE — 99999 PR PBB SHADOW E&M-EST. PATIENT-LVL III: CPT | Mod: PBBFAC,,, | Performed by: NURSE PRACTITIONER

## 2017-07-26 PROCEDURE — 99214 OFFICE O/P EST MOD 30 MIN: CPT | Mod: S$PBB,,, | Performed by: NURSE PRACTITIONER

## 2017-07-26 PROCEDURE — 93010 ELECTROCARDIOGRAM REPORT: CPT | Mod: S$PBB,,, | Performed by: INTERNAL MEDICINE

## 2017-07-26 NOTE — PROGRESS NOTES
Subjective:    Patient ID:  Viv De La Torre is a 67 y.o. female who presents for follow-up of SVT    Viv De La Torre is a patient of Dr. Driver.    HPI     Ms. De La Torre is a 67 y.o. female with SVT, PVCs hypercholesterolemia, hypothyroidism, Meniere's disease, and obesity. Ms. De La Torre has a long-standing hx of  intermittent palpitations, dating back 20+ years. Her episodes have typically been associated with chest/back tightness, typically lasting 10-15 minutes in duration; often breaking with vagal maneuvers.   She presented to [facility] w/sustained symptoms, lasting ~30 minutes; her symptoms however, resolved prior to being evaluated. A Spect stress (10/28/16) was negative. An Echo at the time (10/28/16) revealed normal biventricular structure and function. She continued to experience sustained symptoms, and an Event Monitor at the time revealed symptomatic SVT at 180 bpm, with associated dizziness and diaphoresis. When the episode occurred, she was in her car with her granddaughter, and had to pull over.  Treatment options were discussed; she preferred medical therapy, and was placed on lopressor 25 mg BID. She did well on this for some time, but contracted the Influenza virus over the holidays (2016), and experienced breakthrough pSVT on the the medication; dose adjustment options were limited by hypotension. All options were again discussed, and she again elected to defer an RFA. She was switched to Atenolol 50 mg BID at her last office visit.    Since her last office visit Ms. De La Torre reports feeling well overall without recurrence of clinical SVT. In addition, she denies chest pain, SOB/BUNDY, dizziness, palpitations, or syncope. She recently underwent a steroid injection in her hand (trigger finger), and was told to take NSAIDs; she would like to know if this is OK. She remains active as an avid ; she is able to play without difficulty.     I reviewed today's ECG which demonstrated SR w/occasional PVCs (2  morphologies) at 63 bpm; , QRS 92, and QTc 468.    Review of Systems   Constitution: Negative for diaphoresis and malaise/fatigue.   HENT: Negative for headaches and nosebleeds.    Eyes: Negative for double vision.   Cardiovascular: Negative for chest pain, dyspnea on exertion, irregular heartbeat, near-syncope, palpitations and syncope.   Respiratory: Negative for shortness of breath.    Skin: Negative.    Musculoskeletal: Positive for joint pain.   Neurological: Negative for dizziness and light-headedness.   Psychiatric/Behavioral: Negative for altered mental status.        Objective:    Physical Exam   Constitutional: She is oriented to person, place, and time. She appears well-developed and well-nourished.   HENT:   Head: Normocephalic and atraumatic.   Eyes: Pupils are equal, round, and reactive to light.   Cardiovascular: Normal rate, regular rhythm, normal heart sounds and intact distal pulses.    Pulmonary/Chest: Effort normal and breath sounds normal.   Musculoskeletal: Normal range of motion.   Neurological: She is alert and oriented to person, place, and time.   Skin: She is not diaphoretic.   Vitals reviewed.        Assessment:       1. SVT (supraventricular tachycardia)    2. PVC (premature ventricular contraction)    3. Hypercholesterolemia    4. Hypothyroidism due to acquired atrophy of thyroid    5. Meniere's disease in remission, unspecified laterality    6. Obesity, Class I, BMI 30-34.9         Plan:       In summary, Ms. De La Torre is a 67 y.o. female with SVT, PVCs hypercholesterolemia, hypothyroidism, Meniere's disease, and obesity. Ms. De La Torre is doing well from a rhythm perspective, without reported SVT recurrence since being switched to atenolol. Asymptomatic PVCs noted on today's ECG.     Continue current medication regimen. Will discuss occasional NSAID use with Dr. Driver.   24-Hour Holter now to evaluate PVC burden.  Echo to evaluate EF given frequent PVCs.   Follow up in clinic in 6  months, sooner as needed.     Kate Fowler, MN, APRN, FNP-C        Case discussed with Dr. Mckeon (Consult), who agrees with the aforementioned plan. (A copy of today's note was sent to Dr. Driver.)

## 2017-07-26 NOTE — PROCEDURES
Tendon Sheath  Date/Time: 7/25/2017 9:37 PM  Performed by: RICKEY HAIRSTON  Authorized by: RICKEY HAIRSTON     Consent Done?:  Yes (Verbal)  Timeout: prior to procedure the correct patient, procedure, and site was verified    Indications:  Pain and joint swelling  Site marked: the procedure site was marked    Timeout: prior to procedure the correct patient, procedure, and site was verified    Location:  Ring finger  Site:  L ring MCP  Prep: patient was prepped and draped in usual sterile fashion    Ultrasonic guidance for needle placement?: No    Needle size:  25 G  Approach:  Volar  Medications:  40 mg triamcinolone acetonide 40 mg/mL  Patient tolerance:  Patient tolerated the procedure well with no immediate complications

## 2017-07-28 ENCOUNTER — PATIENT MESSAGE (OUTPATIENT)
Dept: ELECTROPHYSIOLOGY | Facility: CLINIC | Age: 67
End: 2017-07-28

## 2017-07-31 DIAGNOSIS — M79.672 LEFT FOOT PAIN: Primary | ICD-10-CM

## 2017-07-31 RX ORDER — PRAVASTATIN SODIUM 40 MG/1
TABLET ORAL
Qty: 90 TABLET | Refills: 4 | Status: SHIPPED | OUTPATIENT
Start: 2017-07-31 | End: 2017-08-01 | Stop reason: SDUPTHER

## 2017-08-01 ENCOUNTER — OFFICE VISIT (OUTPATIENT)
Dept: ORTHOPEDICS | Facility: CLINIC | Age: 67
End: 2017-08-01
Payer: MEDICARE

## 2017-08-01 ENCOUNTER — HOSPITAL ENCOUNTER (OUTPATIENT)
Dept: RADIOLOGY | Facility: HOSPITAL | Age: 67
Discharge: HOME | End: 2017-08-01
Attending: ORTHOPAEDIC SURGERY
Payer: MEDICARE

## 2017-08-01 ENCOUNTER — PATIENT MESSAGE (OUTPATIENT)
Dept: INFECTIOUS DISEASES | Facility: CLINIC | Age: 67
End: 2017-08-01

## 2017-08-01 VITALS
BODY MASS INDEX: 29.98 KG/M2 | HEART RATE: 40 BPM | DIASTOLIC BLOOD PRESSURE: 71 MMHG | HEIGHT: 67 IN | SYSTOLIC BLOOD PRESSURE: 127 MMHG | WEIGHT: 191 LBS

## 2017-08-01 DIAGNOSIS — M77.42 METATARSALGIA, LEFT FOOT: ICD-10-CM

## 2017-08-01 DIAGNOSIS — G57.62 MORTON'S NEUROMA OF LEFT FOOT: ICD-10-CM

## 2017-08-01 DIAGNOSIS — M79.672 LEFT FOOT PAIN: ICD-10-CM

## 2017-08-01 PROCEDURE — 99213 OFFICE O/P EST LOW 20 MIN: CPT | Mod: PBBFAC,25,PO | Performed by: ORTHOPAEDIC SURGERY

## 2017-08-01 PROCEDURE — 99999 PR PBB SHADOW E&M-EST. PATIENT-LVL III: CPT | Mod: PBBFAC,,, | Performed by: ORTHOPAEDIC SURGERY

## 2017-08-01 PROCEDURE — 73630 X-RAY EXAM OF FOOT: CPT | Mod: 26,LT,, | Performed by: RADIOLOGY

## 2017-08-01 PROCEDURE — 73630 X-RAY EXAM OF FOOT: CPT | Mod: TC,PO,LT

## 2017-08-01 PROCEDURE — 3008F BODY MASS INDEX DOCD: CPT | Mod: ,,, | Performed by: ORTHOPAEDIC SURGERY

## 2017-08-01 PROCEDURE — 1159F MED LIST DOCD IN RCRD: CPT | Mod: ,,, | Performed by: ORTHOPAEDIC SURGERY

## 2017-08-01 PROCEDURE — 1126F AMNT PAIN NOTED NONE PRSNT: CPT | Mod: ,,, | Performed by: ORTHOPAEDIC SURGERY

## 2017-08-01 PROCEDURE — 99203 OFFICE O/P NEW LOW 30 MIN: CPT | Mod: S$PBB,,, | Performed by: ORTHOPAEDIC SURGERY

## 2017-08-01 RX ORDER — PRAVASTATIN SODIUM 40 MG/1
40 TABLET ORAL DAILY
Qty: 90 TABLET | Refills: 4 | Status: SHIPPED | OUTPATIENT
Start: 2017-08-01 | End: 2018-08-29 | Stop reason: SDUPTHER

## 2017-08-01 NOTE — LETTER
August 11, 2017      Lars Kruger MD  [address] [facility] [address] [place] [address]           [facility]  [address] [facility] [address] [place] [address]  Phone: [phone]          Patient: Viv De La Torre   MR Number: [id number]   YOB: 1950   Date of Visit: 8/1/2017       Dear Dr. Lars Kruger:    Thank you for referring Viv De La Torre to me for evaluation. Attached you will find relevant portions of my assessment and plan of care.    If you have questions, please do not hesitate to call me. I look forward to following Viv De La Torre along with you.    Sincerely,    Juan M Dickson MD    Enclosure  CC:  No Recipients    If you would like to receive this communication electronically, please contact [email or web address] or [phone] to request more information on [facility] Link access.    For providers and/or their staff who would like to refer a patient to [facility], please contact us through our one-stop-shop provider referral line, Gissell Suarez, at [phone].    If you feel you have received this communication in error or would no longer like to receive these types of communications, please e-mail [email or web address]

## 2017-08-02 ENCOUNTER — OFFICE VISIT (OUTPATIENT)
Dept: DERMATOLOGY | Facility: CLINIC | Age: 67
End: 2017-08-02
Payer: MEDICARE

## 2017-08-02 DIAGNOSIS — L72.0 MILIA: ICD-10-CM

## 2017-08-02 DIAGNOSIS — D18.01 CHERRY ANGIOMA: Primary | ICD-10-CM

## 2017-08-02 DIAGNOSIS — L81.4 SOLAR LENTIGINOSIS: ICD-10-CM

## 2017-08-02 DIAGNOSIS — L73.8 SEBACEOUS HYPERPLASIA: ICD-10-CM

## 2017-08-02 PROCEDURE — 99999 PR PBB SHADOW E&M-EST. PATIENT-LVL III: CPT | Mod: PBBFAC,,, | Performed by: DERMATOLOGY

## 2017-08-02 PROCEDURE — 1159F MED LIST DOCD IN RCRD: CPT | Mod: ,,, | Performed by: DERMATOLOGY

## 2017-08-02 PROCEDURE — 99214 OFFICE O/P EST MOD 30 MIN: CPT | Mod: S$PBB,,, | Performed by: DERMATOLOGY

## 2017-08-02 PROCEDURE — 99213 OFFICE O/P EST LOW 20 MIN: CPT | Mod: PBBFAC | Performed by: DERMATOLOGY

## 2017-08-02 PROCEDURE — 1126F AMNT PAIN NOTED NONE PRSNT: CPT | Mod: ,,, | Performed by: DERMATOLOGY

## 2017-08-02 RX ORDER — TAZAROTENE 1 MG/G
GEL TOPICAL NIGHTLY
Qty: 30 G | Refills: 3 | Status: SHIPPED | OUTPATIENT
Start: 2017-08-02 | End: 2019-12-06

## 2017-08-02 NOTE — PROGRESS NOTES
"  Subjective:       Patient ID:  Viv De La Torre is a 67 y.o. female who presents for   Chief Complaint   Patient presents with    Skin Check     face, R upper arm, R groin & back x months, painful on forehead, comes & goes, scaly, tx retinA     Ms. De La Torre is a 68 yo woman here for evaluation of a few spots:    1) "Red dots" on her back x years. Asymptomatic.Denies bleeding.  No prior treatments  2) "Indent" on her L forehead. Sometimes painful. Present for 1+ months.   3) "White bumps" on her chin x 2. Present for months. Asymptomatic.     She plays tennis daily and wears hats and sun-protective clothing when she plays. She occasionally forgets to apply sunscreen.       Past Medical History:   Diagnosis Date    AK (actinic keratosis)     Allergy     Seasonal    Anxiety     Arthritis of both knees 4/22/2015    Depression     Dizziness     Nuclear sclerosis - Both Eyes 5/6/2013    Osteoarthritis 2012    [facility]     Review of Systems   Constitutional: Negative for fever, chills, fatigue and malaise.   Skin: Positive for activity-related sunscreen use. Negative for daily sunscreen use and recent sunburn.   Hematologic/Lymphatic: Does not bruise/bleed easily.        Objective:    Physical Exam   Constitutional: She appears well-developed and well-nourished. No distress.   Neurological: She is alert and oriented to person, place, and time. She is not disoriented.   Psychiatric: She has a normal mood and affect.   Skin:   Areas Examined (abnormalities noted in diagram):   Scalp / Hair Palpated and Inspected  Head / Face Inspection Performed  Neck Inspection Performed  Chest / Axilla Inspection Performed  Abdomen Inspection Performed  Back Inspection Performed  RUE Inspected  LUE Inspection Performed                   Diagram Legend     Erythematous scaling macule/papule c/w actinic keratosis       Vascular papule c/w angioma      Pigmented verrucoid papule/plaque c/w seborrheic keratosis      Yellow umbilicated " papule c/w sebaceous hyperplasia      Irregularly shaped tan macule c/w lentigo     1-2 mm smooth white papules consistent with Milia      Movable subcutaneous cyst with punctum c/w epidermal inclusion cyst      Subcutaneous movable cyst c/w pilar cyst      Firm pink to brown papule c/w dermatofibroma      Pedunculated fleshy papule(s) c/w skin tag(s)      Evenly pigmented macule c/w junctional nevus     Mildly variegated pigmented, slightly irregular-bordered macule c/w mildly atypical nevus      Flesh colored to evenly pigmented papule c/w intradermal nevus       Pink pearly papule/plaque c/w basal cell carcinoma      Erythematous hyperkeratotic cursted plaque c/w SCC      Surgical scar with no sign of skin cancer recurrence      Open and closed comedones      Inflammatory papules and pustules      Verrucoid papule consistent consistent with wart     Erythematous eczematous patches and plaques     Dystrophic onycholytic nail with subungual debris c/w onychomycosis     Umbilicated papule    Erythematous-base heme-crusted tan verrucoid plaque consistent with inflamed seborrheic keratosis     Erythematous Silvery Scaling Plaque c/w Psoriasis     See annotation      Assessment / Plan:        1) Sebaceous hyperplasia:  - Reassurance given about benign nature of lesion on R temple, chin, medial brow.  - Patient would like to start a topical therapy for this.  - Can try Tazorac 0.1% gel nightly. Explained impact may be limited.     2) Cherry angiomas:  - Reassurance given about benign nature of lesions.    3) Milia:  - Reassurance given about benign nature of lesions.     4) Seborrheic Keratosis  These are benign inherited growths without a malignant potential. Reassurance given to patient. No treatment is necessary.     5) Lentigines  This is a benign hyperpigmented sun induced lesion. Daily sun protection will reduce the number of new lesions. Treatment of these benign lesions are considered cosmetic.    6) Screening  for skin cancer:  - No concerning lesions noted today.  - Emphasized importance of judicious sun protection.

## 2017-08-03 ENCOUNTER — APPOINTMENT (OUTPATIENT)
Dept: CARDIOLOGY | Facility: CLINIC | Age: 67
End: 2017-08-03
Payer: MEDICARE

## 2017-08-03 PROCEDURE — 93227 XTRNL ECG REC<48 HR R&I: CPT | Mod: S$PBB,,, | Performed by: INTERNAL MEDICINE

## 2017-08-03 PROCEDURE — 93226 XTRNL ECG REC<48 HR SCAN A/R: CPT | Mod: PBBFAC,PO | Performed by: INTERNAL MEDICINE

## 2017-08-11 PROBLEM — M79.645 FINGER PAIN, LEFT: Status: RESOLVED | Noted: 2017-07-25 | Resolved: 2017-08-11

## 2017-08-11 PROBLEM — M77.42 METATARSALGIA, LEFT FOOT: Status: ACTIVE | Noted: 2017-08-11

## 2017-08-11 PROBLEM — G57.62 MORTON'S NEUROMA OF LEFT FOOT: Status: ACTIVE | Noted: 2017-08-11

## 2017-08-11 NOTE — PROGRESS NOTES
"HPI: Viv De La Torre is a  67 y.o. female who was referred to me by Dr. Kruger and was seen in consultation today for left foot pain.  No history of injury or trauma. She saw Dr. Cota for this previously and has tried over the counter inserts.  She notes pain in the ball of the foot and in the 1st toe on the top of the foot. She notes numbness in the toes with walking.     PAST MEDICAL/SURGICAL/FAMILY/SOCIAL/ HISTORY: REVIEWED    ALLERGIES/MEDICATIONS: REVIEWED       Review of Systems:     Constitution: Negative.   HEENT: Negative.   Eyes: Negative.   Cardiovascular: Negative.   Respiratory: Negative.   Endocrine: Negative.   Hematologic/Lymphatic: Negative.   Skin: Negative.   Musculoskeletal: Positive for left foot pain   Gastrointestinal: Negative.   Genitourinary: Negative.   Neurological: Negative.   Psychiatric/Behavioral: Negative.   Allergic/Immunologic: Negative.       PHYSICAL EXAM:  Vitals:    08/01/17 1111   BP: 127/71   Pulse: (!) 40     Ht Readings from Last 1 Encounters:   08/01/17 5' 7" (1.702 m)     Wt Readings from Last 1 Encounters:   08/01/17 86.6 kg (191 lb)       GENERAL: Well developed, well nourished, no acute distress,very pleasant.  SKIN: Skin is intact. No atrophy, abrasions or lesions are noted.   Neurological: Normal mental status. Appropriate and conversant. Alert and oriented x 3.  GAIT: Walks with non-antalgic gait.    Left  lower extremity:  2+ dorsalis pedis pulse.  Capillary refill < 3 seconds.  Normal range of motion tibiotalar and subtalar joints. Normal alignment of the forefoot and the hindfoot.  5/5 strength EHL, FHL, tibialis anterior, gastrocsoleus, tibialis posterior and peroneals. Sensation to light touch intact sural, saphenous, superficial peroneal and deep peroneal nerves. No swelling, ecchymosis or deformity. No lymphadenopathy, no masses or tumors palpated.   tenderness to palpation under 2nd metatarsal head and 2nd webspace. Mild  tenderness to palpation 1st  " metatarsalphalangeal joint.     XRAYS:   3 views of left foot obtained and reviewed today reveal mild  degenerative changes 1st mtpj, 2nd metatarsal much longer than 1st.       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 More than half the time was spent counseling the patient on his condition and the options for operative versus non-operative care. We discussed surgical options for this problem and I gave her a handout to order metatarsal pads. F/u when ready for surgery.

## 2017-08-30 ENCOUNTER — PATIENT MESSAGE (OUTPATIENT)
Dept: ELECTROPHYSIOLOGY | Facility: CLINIC | Age: 67
End: 2017-08-30

## 2017-09-26 RX ORDER — AZELASTINE 1 MG/ML
SPRAY, METERED NASAL
Qty: 30 ML | Refills: 6 | Status: SHIPPED | OUTPATIENT
Start: 2017-09-26 | End: 2018-11-07 | Stop reason: SDUPTHER

## 2017-10-03 ENCOUNTER — CLINICAL SUPPORT (OUTPATIENT)
Dept: FAMILY MEDICINE | Facility: CLINIC | Age: 67
End: 2017-10-03
Payer: MEDICARE

## 2017-10-03 ENCOUNTER — PATIENT MESSAGE (OUTPATIENT)
Dept: INFECTIOUS DISEASES | Facility: CLINIC | Age: 67
End: 2017-10-03

## 2017-10-03 PROCEDURE — G0008 ADMIN INFLUENZA VIRUS VAC: HCPCS | Mod: PBBFAC,PN

## 2017-10-18 ENCOUNTER — OFFICE VISIT (OUTPATIENT)
Dept: DERMATOLOGY | Facility: CLINIC | Age: 67
End: 2017-10-18
Payer: MEDICARE

## 2017-10-18 DIAGNOSIS — L73.9 FOLLICULITIS: Primary | ICD-10-CM

## 2017-10-18 DIAGNOSIS — D18.00 ANGIOMA: ICD-10-CM

## 2017-10-18 PROCEDURE — 99213 OFFICE O/P EST LOW 20 MIN: CPT | Mod: S$PBB,,, | Performed by: NURSE PRACTITIONER

## 2017-10-18 PROCEDURE — 99999 PR PBB SHADOW E&M-EST. PATIENT-LVL III: CPT | Mod: PBBFAC,,, | Performed by: NURSE PRACTITIONER

## 2017-10-18 PROCEDURE — 99213 OFFICE O/P EST LOW 20 MIN: CPT | Mod: PBBFAC | Performed by: NURSE PRACTITIONER

## 2017-10-18 RX ORDER — KETOCONAZOLE 20 MG/ML
SHAMPOO, SUSPENSION TOPICAL
Qty: 120 ML | Refills: 5 | Status: SHIPPED | OUTPATIENT
Start: 2017-10-18 | End: 2017-11-14 | Stop reason: ALTCHOICE

## 2017-10-18 RX ORDER — CLOBETASOL PROPIONATE 0.46 MG/ML
SOLUTION TOPICAL
Qty: 60 ML | Refills: 3 | Status: SHIPPED | OUTPATIENT
Start: 2017-10-18 | End: 2017-11-14 | Stop reason: ALTCHOICE

## 2017-10-18 NOTE — PROGRESS NOTES
Subjective:       Patient ID:  Viv De La Torre is a 67 y.o. female who presents for   Chief Complaint   Patient presents with    Lesion     scalp     Lesion  - Initial  Affected locations: scalp  Duration: 2 months  Signs / symptoms: tender  Severity: mild  Timing: constant  Aggravated by: picking  Relieving factors/Treatments tried: nothing  Improvement on treatment: mild    Last seen 8/2/17 by MARTÍNEZ.  No h/o NMSC or MM    Review of Systems   Constitutional: Negative for fever, chills, fatigue and malaise.   Skin: Positive for activity-related sunscreen use and wears hat (when playing tennis). Negative for daily sunscreen use and recent sunburn.   Hematologic/Lymphatic: Does not bruise/bleed easily.        Objective:    Physical Exam   Constitutional: She appears well-developed and well-nourished. No distress.   Neurological: She is alert and oriented to person, place, and time. She is not disoriented.   Psychiatric: She has a normal mood and affect.   Skin:   Areas Examined (abnormalities noted in diagram):   Scalp / Hair Palpated and Inspected  Abdomen Inspection Performed                   Diagram Legend     Erythematous scaling macule/papule c/w actinic keratosis       Vascular papule c/w angioma      Pigmented verrucoid papule/plaque c/w seborrheic keratosis      Yellow umbilicated papule c/w sebaceous hyperplasia      Irregularly shaped tan macule c/w lentigo     1-2 mm smooth white papules consistent with Milia      Movable subcutaneous cyst with punctum c/w epidermal inclusion cyst      Subcutaneous movable cyst c/w pilar cyst      Firm pink to brown papule c/w dermatofibroma      Pedunculated fleshy papule(s) c/w skin tag(s)      Evenly pigmented macule c/w junctional nevus     Mildly variegated pigmented, slightly irregular-bordered macule c/w mildly atypical nevus      Flesh colored to evenly pigmented papule c/w intradermal nevus       Pink pearly papule/plaque c/w basal cell carcinoma      Erythematous  hyperkeratotic cursted plaque c/w SCC      Surgical scar with no sign of skin cancer recurrence      Open and closed comedones      Inflammatory papules and pustules      Verrucoid papule consistent consistent with wart     Erythematous eczematous patches and plaques     Dystrophic onycholytic nail with subungual debris c/w onychomycosis     Umbilicated papule    Erythematous-base heme-crusted tan verrucoid plaque consistent with inflamed seborrheic keratosis     Erythematous Silvery Scaling Plaque c/w Psoriasis     See annotation      Assessment / Plan:        Folliculitis  -     clobetasol (TEMOVATE) 0.05 % external solution; Use on scalp one - two times daily as needed for scaling or itching  Dispense: 60 mL; Refill: 3  -     ketoconazole (NIZORAL) 2 % shampoo; Wash hair with medicated shampoo at least 2x/week - let sit on scalp at least 5 minutes prior to rinsing  Dispense: 120 mL; Refill: 5    Instructed patient to leave ketoconazole shampoo on scalp for 5 mins prior to washing off.    Angioma- left groin  This is a benign vascular lesion. Reassurance given. No treatment required.   Discussed with patient the importance of not manipulating skin lesions. Trauma often exacerbates condition. Trimming nails is recommended to avoid puncturing skin.              Return in about 1 month (around 11/18/2017).

## 2017-10-20 ENCOUNTER — TELEPHONE (OUTPATIENT)
Dept: FAMILY MEDICINE | Facility: CLINIC | Age: 67
End: 2017-10-20

## 2017-11-14 ENCOUNTER — OFFICE VISIT (OUTPATIENT)
Dept: DERMATOLOGY | Facility: CLINIC | Age: 67
End: 2017-11-14
Payer: MEDICARE

## 2017-11-14 DIAGNOSIS — L02.92 FURUNCLE: Primary | ICD-10-CM

## 2017-11-14 DIAGNOSIS — L73.9 FOLLICULITIS: ICD-10-CM

## 2017-11-14 PROCEDURE — 11900 INJECT SKIN LESIONS </W 7: CPT | Mod: S$PBB,,, | Performed by: NURSE PRACTITIONER

## 2017-11-14 PROCEDURE — 99212 OFFICE O/P EST SF 10 MIN: CPT | Mod: PBBFAC,25 | Performed by: NURSE PRACTITIONER

## 2017-11-14 PROCEDURE — 99213 OFFICE O/P EST LOW 20 MIN: CPT | Mod: 25,S$PBB,, | Performed by: NURSE PRACTITIONER

## 2017-11-14 PROCEDURE — 99999 PR PBB SHADOW E&M-EST. PATIENT-LVL II: CPT | Mod: PBBFAC,,, | Performed by: NURSE PRACTITIONER

## 2017-11-14 PROCEDURE — 11900 INJECT SKIN LESIONS </W 7: CPT | Mod: PBBFAC | Performed by: NURSE PRACTITIONER

## 2017-11-14 NOTE — PROGRESS NOTES
Subjective:       Patient ID:  Viv De La Torre is a 67 y.o. female who presents for   Chief Complaint   Patient presents with    Folliculitis    Lesion     Folliculitis  - Follow-up  Symptom course: improving (last seen 10/18/17)  Currently using: ketoconazole shampoo qweek and clob soln bid.  Affected locations: scalp  Signs / symptoms: tender and itching (occasional)  Severity: mild    Lesion  - Initial  Affected locations: right groin.  Duration: 2 months  Signs / symptoms: tender and swelling  Severity: mild  Timing: constant  Aggravated by: nothing  Relieving factors/Treatments tried: nothing  Improvement on treatment: no relief        Review of Systems   Constitutional: Negative for fever, chills, fatigue and malaise.   Skin: Positive for daily sunscreen use, activity-related sunscreen use and wears hat (when playing tennis). Negative for itching, rash and recent sunburn.   Hematologic/Lymphatic: Does not bruise/bleed easily.        Objective:    Physical Exam   Constitutional: She appears well-developed and well-nourished. No distress.   Neurological: She is alert and oriented to person, place, and time. She is not disoriented.   Psychiatric: She has a normal mood and affect.   Skin:   Areas Examined (abnormalities noted in diagram):   Scalp / Hair Palpated and Inspected  Genitals / Buttocks / Groin Inspection Performed                   Diagram Legend     Erythematous scaling macule/papule c/w actinic keratosis       Vascular papule c/w angioma      Pigmented verrucoid papule/plaque c/w seborrheic keratosis      Yellow umbilicated papule c/w sebaceous hyperplasia      Irregularly shaped tan macule c/w lentigo     1-2 mm smooth white papules consistent with Milia      Movable subcutaneous cyst with punctum c/w epidermal inclusion cyst      Subcutaneous movable cyst c/w pilar cyst      Firm pink to brown papule c/w dermatofibroma      Pedunculated fleshy papule(s) c/w skin tag(s)      Evenly pigmented macule  c/w junctional nevus     Mildly variegated pigmented, slightly irregular-bordered macule c/w mildly atypical nevus      Flesh colored to evenly pigmented papule c/w intradermal nevus       Pink pearly papule/plaque c/w basal cell carcinoma      Erythematous hyperkeratotic cursted plaque c/w SCC      Surgical scar with no sign of skin cancer recurrence      Open and closed comedones      Inflammatory papules and pustules      Verrucoid papule consistent consistent with wart     Erythematous eczematous patches and plaques     Dystrophic onycholytic nail with subungual debris c/w onychomycosis     Umbilicated papule    Erythematous-base heme-crusted tan verrucoid plaque consistent with inflamed seborrheic keratosis     Erythematous Silvery Scaling Plaque c/w Psoriasis     See annotation      Assessment / Plan:        Furuncle  Intralesional Kenalog 40mg/cc (0.4 cc total) injected into 1 lesions on the right groin today after obtaining verbal consent including risk of surrounding hypopigmentation. Patient tolerated procedure well.      Folliculitis  Continue ketoconazole shampoo and clob soln             Return if symptoms worsen or fail to improve.

## 2017-11-27 RX ORDER — LEVOTHYROXINE SODIUM 25 UG/1
25 TABLET ORAL
Qty: 30 TABLET | Refills: 11 | Status: SHIPPED | OUTPATIENT
Start: 2017-11-27 | End: 2018-11-15 | Stop reason: SDUPTHER

## 2017-12-13 RX ORDER — TRIAMCINOLONE ACETONIDE 40 MG/ML
40 INJECTION, SUSPENSION INTRA-ARTICULAR; INTRAMUSCULAR
Status: COMPLETED | OUTPATIENT
Start: 2017-12-13 | End: 2017-12-13

## 2017-12-13 RX ADMIN — TRIAMCINOLONE ACETONIDE 40 MG: 40 INJECTION, SUSPENSION INTRA-ARTICULAR; INTRAMUSCULAR at 09:12

## 2017-12-17 ENCOUNTER — PATIENT MESSAGE (OUTPATIENT)
Dept: ELECTROPHYSIOLOGY | Facility: CLINIC | Age: 67
End: 2017-12-17

## 2017-12-18 ENCOUNTER — PATIENT MESSAGE (OUTPATIENT)
Dept: INFECTIOUS DISEASES | Facility: CLINIC | Age: 67
End: 2017-12-18

## 2018-02-21 RX ORDER — ATENOLOL 50 MG/1
50 TABLET ORAL 2 TIMES DAILY
Qty: 180 TABLET | Refills: 3 | Status: SHIPPED | OUTPATIENT
Start: 2018-02-21 | End: 2019-03-07 | Stop reason: SDUPTHER

## 2018-03-22 RX ORDER — CLOBETASOL PROPIONATE 0.5 MG/G
CREAM TOPICAL
Qty: 60 G | Refills: 3 | Status: SHIPPED | OUTPATIENT
Start: 2018-03-22

## 2018-03-22 NOTE — TELEPHONE ENCOUNTER
----- Message from Constanza Tran sent at 3/22/2018 10:13 AM CDT -----  Contact: [facility] [phone]  .Refill request.  clobetasol (TEMOVATE) 0.05 % cream    ..  [address] [facility] [address]  [address] [address]  [facility] [address]  Phone: [phone] Fax: [phone]

## 2018-04-19 ENCOUNTER — OFFICE VISIT (OUTPATIENT)
Dept: OPTOMETRY | Facility: CLINIC | Age: 68
End: 2018-04-19
Payer: MEDICARE

## 2018-04-19 DIAGNOSIS — H25.13 NUCLEAR SCLEROSIS OF BOTH EYES: Primary | ICD-10-CM

## 2018-04-19 DIAGNOSIS — H52.4 ASTIGMATISM WITH PRESBYOPIA, BILATERAL: ICD-10-CM

## 2018-04-19 DIAGNOSIS — H52.203 ASTIGMATISM WITH PRESBYOPIA, BILATERAL: ICD-10-CM

## 2018-04-19 PROCEDURE — 99212 OFFICE O/P EST SF 10 MIN: CPT | Mod: PBBFAC | Performed by: OPTOMETRIST

## 2018-04-19 PROCEDURE — 99999 PR PBB SHADOW E&M-EST. PATIENT-LVL II: CPT | Mod: PBBFAC,,, | Performed by: OPTOMETRIST

## 2018-04-19 PROCEDURE — 92015 DETERMINE REFRACTIVE STATE: CPT | Mod: ,,, | Performed by: OPTOMETRIST

## 2018-04-19 PROCEDURE — 92014 COMPRE OPH EXAM EST PT 1/>: CPT | Mod: S$PBB,,, | Performed by: OPTOMETRIST

## 2018-04-20 NOTE — PROGRESS NOTES
HPI     Last eye exam was 4/19/17 with Dr. Lynn.  Patient states decrease in distance vision especially at night when   driving. Most recent glasses broke and has been using OTC readers. Wanted   to get an updated glasses rx today. Sees floaters OU.   Patient denies diplopia, headaches, flashes, and pain.      Last edited by Smitha Victoria on 4/19/2018  1:34 PM. (History)            Assessment /Plan     For exam results, see Encounter Report.    Nuclear sclerosis of both eyes    Astigmatism with presbyopia, bilateral              1.  Educated on cataracts and affects on vision.  Monitor.  2.  Distance and reading rx given.  Eye health normal OU.          RTC 1 year for routine exam.

## 2018-05-22 RX ORDER — ESTRADIOL 2 MG/1
TABLET ORAL
Qty: 90 TABLET | Refills: 3 | Status: SHIPPED | OUTPATIENT
Start: 2018-05-22 | End: 2018-08-29 | Stop reason: SDUPTHER

## 2018-08-29 RX ORDER — PRAVASTATIN SODIUM 40 MG/1
TABLET ORAL
Qty: 90 TABLET | Refills: 4 | Status: SHIPPED | OUTPATIENT
Start: 2018-08-29 | End: 2019-03-07 | Stop reason: ALTCHOICE

## 2018-08-29 RX ORDER — ESTRADIOL 2 MG/1
2 TABLET ORAL DAILY
Qty: 90 TABLET | Refills: 3 | Status: SHIPPED | OUTPATIENT
Start: 2018-08-29 | End: 2019-08-30 | Stop reason: SDUPTHER

## 2018-08-29 NOTE — TELEPHONE ENCOUNTER
----- Message from Mera Hoffmann sent at 8/29/2018  1:45 PM CDT -----  Contact: self  Pt needs a refill on her medication.  She uses [facility] in [place].    Pt can be reached at [phone]    Vaginal cream

## 2018-09-17 ENCOUNTER — TELEPHONE (OUTPATIENT)
Dept: DERMATOLOGY | Facility: CLINIC | Age: 68
End: 2018-09-17

## 2018-09-17 NOTE — TELEPHONE ENCOUNTER
Pt contacted w/ appointment .----- Message from Denisa Eckert sent at 9/14/2018  1:22 PM CDT -----  Contact: self  Patient request Dermatology patient states has growth on back getting bigger.  Patient has appointment scheduled for 10/9/2018. Patient states would like sooner appointment on Tuesday or Thursday if possible   Please call pt at [phone]

## 2018-09-18 ENCOUNTER — OFFICE VISIT (OUTPATIENT)
Dept: DERMATOLOGY | Facility: CLINIC | Age: 68
End: 2018-09-18
Payer: MEDICARE

## 2018-09-18 VITALS — WEIGHT: 191 LBS | BODY MASS INDEX: 29.98 KG/M2 | HEIGHT: 67 IN

## 2018-09-18 DIAGNOSIS — L82.0 INFLAMED SEBORRHEIC KERATOSIS: ICD-10-CM

## 2018-09-18 DIAGNOSIS — L02.92 FURUNCLE: Primary | ICD-10-CM

## 2018-09-18 PROCEDURE — 17110 DESTRUCTION B9 LES UP TO 14: CPT | Mod: PBBFAC,PO | Performed by: DERMATOLOGY

## 2018-09-18 PROCEDURE — 99213 OFFICE O/P EST LOW 20 MIN: CPT | Mod: 25,S$PBB,, | Performed by: DERMATOLOGY

## 2018-09-18 PROCEDURE — 99999 PR PBB SHADOW E&M-EST. PATIENT-LVL II: CPT | Mod: PBBFAC,,, | Performed by: DERMATOLOGY

## 2018-09-18 PROCEDURE — 17110 DESTRUCTION B9 LES UP TO 14: CPT | Mod: S$PBB,,, | Performed by: DERMATOLOGY

## 2018-09-18 PROCEDURE — 99212 OFFICE O/P EST SF 10 MIN: CPT | Mod: PBBFAC,PO | Performed by: DERMATOLOGY

## 2018-09-18 RX ORDER — DOXYCYCLINE 100 MG/1
100 TABLET ORAL 2 TIMES DAILY
Qty: 10 TABLET | Refills: 0 | Status: SHIPPED | OUTPATIENT
Start: 2018-09-18 | End: 2018-09-18 | Stop reason: SDUPTHER

## 2018-09-18 RX ORDER — DOXYCYCLINE 100 MG/1
100 TABLET ORAL 2 TIMES DAILY
Qty: 20 TABLET | Refills: 0 | Status: SHIPPED | OUTPATIENT
Start: 2018-09-18 | End: 2018-09-28

## 2018-09-18 NOTE — PROGRESS NOTES
Subjective:       Patient ID:  Viv De La Torre is a 68 y.o. female who presents for   Chief Complaint   Patient presents with    Lesion     67 yo F presents for follow up.  Last OV with me 8/2017.  She has a lesion on her R buttock that is described as tender, no drainage.  She is treating with Keflex PO and mupirocin ointment.  There is also a tender rough spot on her back that she would like to have treated.  No prior treatments        Past Medical History:   Diagnosis Date    AK (actinic keratosis)     Allergy     Seasonal    Anxiety     Arthritis of both knees 4/22/2015    Depression     Dizziness     Fracture of lateral malleolus     Hypothyroidism due to acquired atrophy of thyroid     Nuclear sclerosis - Both Eyes 5/6/2013    Osteoarthritis 2012    [facility]     Review of Systems   Skin: Positive for abscesses. Negative for itching, rash, dry skin and daily sunscreen use.        Objective:    Physical Exam   Constitutional: She appears well-developed and well-nourished.   Neurological: She is alert and oriented to person, place, and time.   Psychiatric: She has a normal mood and affect.   Skin:   Areas Examined (abnormalities noted in diagram):   Head / Face Inspection Performed  Neck Inspection Performed  Genitals / Buttocks / Groin Inspection Performed  Back Inspection Performed              Diagram Legend     Erythematous scaling macule/papule c/w actinic keratosis       Vascular papule c/w angioma      Pigmented verrucoid papule/plaque c/w seborrheic keratosis      Yellow umbilicated papule c/w sebaceous hyperplasia      Irregularly shaped tan macule c/w lentigo     1-2 mm smooth white papules consistent with Milia      Movable subcutaneous cyst with punctum c/w epidermal inclusion cyst      Subcutaneous movable cyst c/w pilar cyst      Firm pink to brown papule c/w dermatofibroma      Pedunculated fleshy papule(s) c/w skin tag(s)      Evenly pigmented macule c/w junctional nevus     Mildly  variegated pigmented, slightly irregular-bordered macule c/w mildly atypical nevus      Flesh colored to evenly pigmented papule c/w intradermal nevus       Pink pearly papule/plaque c/w basal cell carcinoma      Erythematous hyperkeratotic cursted plaque c/w SCC      Surgical scar with no sign of skin cancer recurrence      Open and closed comedones      Inflammatory papules and pustules      Verrucoid papule consistent consistent with wart     Erythematous eczematous patches and plaques     Dystrophic onycholytic nail with subungual debris c/w onychomycosis     Umbilicated papule    Erythematous-base heme-crusted tan verrucoid plaque consistent with inflamed seborrheic keratosis     Erythematous Silvery Scaling Plaque c/w Psoriasis     See annotation      Assessment / Plan:        Furuncle  -     doxycycline monohydrate 100 mg Tab; Take 1 tablet (100 mg total) by mouth 2 (two) times daily. for 10 days  Dispense: 20 tablet; Refill: 0  warm compresses bid    Inflamed Seborrheic Keratosis  Cryosurgery procedure note:    Verbal consent from the patient is obtained. Liquid nitrogen cryosurgery is applied to 1 lesion to produce a freeze injury. The patient is aware that blisters may form and is instructed on wound care with gentle cleansing and use of vaseline ointment to keep moist until healed. The patient is supplied a handout on cryosurgery and is instructed to call if lesion does not completely resolve.              Follow-up for Pt will call for follow up.

## 2018-09-21 ENCOUNTER — PATIENT MESSAGE (OUTPATIENT)
Dept: DERMATOLOGY | Facility: CLINIC | Age: 68
End: 2018-09-21

## 2018-09-21 ENCOUNTER — TELEPHONE (OUTPATIENT)
Dept: DERMATOLOGY | Facility: CLINIC | Age: 68
End: 2018-09-21

## 2018-09-21 NOTE — TELEPHONE ENCOUNTER
Called pt regarding bumps that appeared across buttocks. Pt stated that they have went away since first message was sent. Told her to call or message office if the bumps came back or got worse.

## 2018-09-27 ENCOUNTER — PATIENT MESSAGE (OUTPATIENT)
Dept: DERMATOLOGY | Facility: CLINIC | Age: 68
End: 2018-09-27

## 2018-10-03 ENCOUNTER — OFFICE VISIT (OUTPATIENT)
Dept: DERMATOLOGY | Facility: CLINIC | Age: 68
End: 2018-10-03
Payer: MEDICARE

## 2018-10-03 VITALS — BODY MASS INDEX: 29.98 KG/M2 | WEIGHT: 191 LBS | HEIGHT: 67 IN

## 2018-10-03 DIAGNOSIS — L02.92 FURUNCLE: Primary | ICD-10-CM

## 2018-10-03 PROCEDURE — 99213 OFFICE O/P EST LOW 20 MIN: CPT | Mod: S$PBB,,, | Performed by: DERMATOLOGY

## 2018-10-03 PROCEDURE — 87070 CULTURE OTHR SPECIMN AEROBIC: CPT

## 2018-10-03 PROCEDURE — 99212 OFFICE O/P EST SF 10 MIN: CPT | Mod: PBBFAC,PO | Performed by: DERMATOLOGY

## 2018-10-03 PROCEDURE — 99999 PR PBB SHADOW E&M-EST. PATIENT-LVL II: CPT | Mod: PBBFAC,,, | Performed by: DERMATOLOGY

## 2018-10-03 NOTE — PROGRESS NOTES
Subjective:       Patient ID:  Viv De La Torre is a 68 y.o. female who presents for   Chief Complaint   Patient presents with    Follow-up     Last OV with me 9-. Here today for follow up for suspected furuncle treated with doxycycline bid x 10 days & warm compresses bid has not helped.  It is not painful but she just know it is there     Past Medical History:  Diagnosis Date   AK (actinic keratosis)     Allergy      Seasonal   Anxiety     Arthritis of both knees 4/22/2015   Depression     Dizziness     Fracture of lateral malleolus     Hypothyroidism due to acquired atrophy of thyroid     Nuclear sclerosis - Both Eyes 5/6/2013   Osteoarthritis 2012    [facility]                  Review of Systems   Skin: Positive for abscesses. Negative for itching, rash, dry skin and daily sunscreen use.        Objective:    Physical Exam   Constitutional: She appears well-developed and well-nourished.   Neurological: She is alert and oriented to person, place, and time.   Psychiatric: She has a normal mood and affect.   Skin:   Areas Examined (abnormalities noted in diagram):   Head / Face Inspection Performed  Neck Inspection Performed  Genitals / Buttocks / Groin Inspection Performed              Diagram Legend     Erythematous scaling macule/papule c/w actinic keratosis       Vascular papule c/w angioma      Pigmented verrucoid papule/plaque c/w seborrheic keratosis      Yellow umbilicated papule c/w sebaceous hyperplasia      Irregularly shaped tan macule c/w lentigo     1-2 mm smooth white papules consistent with Milia      Movable subcutaneous cyst with punctum c/w epidermal inclusion cyst      Subcutaneous movable cyst c/w pilar cyst      Firm pink to brown papule c/w dermatofibroma      Pedunculated fleshy papule(s) c/w skin tag(s)      Evenly pigmented macule c/w junctional nevus     Mildly variegated pigmented, slightly irregular-bordered macule c/w mildly atypical nevus      Flesh colored to evenly  pigmented papule c/w intradermal nevus       Pink pearly papule/plaque c/w basal cell carcinoma      Erythematous hyperkeratotic cursted plaque c/w SCC      Surgical scar with no sign of skin cancer recurrence      Open and closed comedones      Inflammatory papules and pustules      Verrucoid papule consistent consistent with wart     Erythematous eczematous patches and plaques     Dystrophic onycholytic nail with subungual debris c/w onychomycosis     Umbilicated papule    Erythematous-base heme-crusted tan verrucoid plaque consistent with inflamed seborrheic keratosis     Erythematous Silvery Scaling Plaque c/w Psoriasis     See annotation      Assessment / Plan:        Furuncle with possible overlying prurigo  Improving  Will send culture today  Continue warm compresses bid  -     Aerobic culture           Follow-up for pending culture results.

## 2018-10-06 LAB — BACTERIA SPEC AEROBE CULT: NORMAL

## 2018-10-08 ENCOUNTER — TELEPHONE (OUTPATIENT)
Dept: INFECTIOUS DISEASES | Facility: CLINIC | Age: 68
End: 2018-10-08

## 2018-10-08 DIAGNOSIS — Z00.00 PREVENTATIVE HEALTH CARE: Primary | ICD-10-CM

## 2018-10-08 NOTE — TELEPHONE ENCOUNTER
----- Message from Gifyt Hatfield MA sent at 10/8/2018  2:48 PM CDT -----  Please put in orders for shingles and pneumonia vaccine  ----- Message -----  From: Sridevi Newberry  Sent: 10/8/2018   2:30 PM  To: [facility] Staff    Type: Needs Medical Advice    Who Called: Patient   Best Call Back Number: [phone]  Additional Information: Patient is requesting orders for the shingles and pneumonia vaccine, she is requesting these to be preformed in the [facility] on 10/04/18 please contact to advise/set up her injections.     Thank you

## 2018-10-11 ENCOUNTER — HOSPITAL ENCOUNTER (OUTPATIENT)
Dept: RADIOLOGY | Facility: HOSPITAL | Age: 68
Discharge: HOME | End: 2018-10-11
Attending: OBSTETRICS & GYNECOLOGY
Payer: MEDICARE

## 2018-10-11 DIAGNOSIS — Z12.39 ENCOUNTER FOR SPECIAL SCREENING EXAMINATION FOR NEOPLASM OF BREAST: ICD-10-CM

## 2018-10-11 PROCEDURE — 77067 SCR MAMMO BI INCL CAD: CPT | Mod: 26,,, | Performed by: RADIOLOGY

## 2018-10-11 PROCEDURE — 77067 SCR MAMMO BI INCL CAD: CPT | Mod: TC,PO

## 2018-10-11 PROCEDURE — 77063 BREAST TOMOSYNTHESIS BI: CPT | Mod: TC,PO

## 2018-10-11 PROCEDURE — 77063 BREAST TOMOSYNTHESIS BI: CPT | Mod: 26,,, | Performed by: RADIOLOGY

## 2018-10-16 ENCOUNTER — CLINICAL SUPPORT (OUTPATIENT)
Dept: INFECTIOUS DISEASES | Facility: CLINIC | Age: 68
End: 2018-10-16
Payer: MEDICARE

## 2018-10-16 DIAGNOSIS — Z00.00 PREVENTATIVE HEALTH CARE: ICD-10-CM

## 2018-10-16 PROCEDURE — 99212 OFFICE O/P EST SF 10 MIN: CPT | Mod: PBBFAC,25

## 2018-10-16 PROCEDURE — 90662 IIV NO PRSV INCREASED AG IM: CPT | Mod: PBBFAC

## 2018-10-16 PROCEDURE — 90670 PCV13 VACCINE IM: CPT | Mod: PBBFAC

## 2018-10-16 PROCEDURE — 99999 PR PBB SHADOW E&M-EST. PATIENT-LVL II: CPT | Mod: PBBFAC,,,

## 2018-10-16 NOTE — PROGRESS NOTES
Patient received HD flu and Pneumonia 13 vaccines tolerated well. Instructed to remain in facility for 15 minutes and return if feelings of reaction occur. Left in NAD

## 2018-10-18 ENCOUNTER — HOSPITAL ENCOUNTER (OUTPATIENT)
Dept: RADIOLOGY | Facility: HOSPITAL | Age: 68
Discharge: HOME | End: 2018-10-18
Attending: OBSTETRICS & GYNECOLOGY
Payer: MEDICARE

## 2018-10-18 DIAGNOSIS — E28.39 ESTROGEN DEFICIENCY: ICD-10-CM

## 2018-10-18 PROCEDURE — 77080 DXA BONE DENSITY AXIAL: CPT | Mod: 26,,, | Performed by: RADIOLOGY

## 2018-10-18 PROCEDURE — 77080 DXA BONE DENSITY AXIAL: CPT | Mod: TC,PO

## 2018-11-07 DIAGNOSIS — Z78.9 ALLERGY HISTORY UNKNOWN: ICD-10-CM

## 2018-11-07 RX ORDER — ALBUTEROL SULFATE 90 UG/1
2 AEROSOL, METERED RESPIRATORY (INHALATION) EVERY 6 HOURS PRN
Qty: 3 INHALER | Refills: 4 | Status: SHIPPED | OUTPATIENT
Start: 2018-11-07

## 2018-11-07 RX ORDER — AZELASTINE 1 MG/ML
2 SPRAY, METERED NASAL 2 TIMES DAILY
Qty: 30 ML | Refills: 6 | Status: SHIPPED | OUTPATIENT
Start: 2018-11-07 | End: 2023-06-07 | Stop reason: SDUPTHER

## 2018-11-15 RX ORDER — LEVOTHYROXINE SODIUM 25 UG/1
TABLET ORAL
Qty: 90 TABLET | Refills: 3 | Status: SHIPPED | OUTPATIENT
Start: 2018-11-15 | End: 2019-09-10 | Stop reason: SDUPTHER

## 2018-12-11 ENCOUNTER — CLINICAL SUPPORT (OUTPATIENT)
Dept: INFECTIOUS DISEASES | Facility: CLINIC | Age: 68
End: 2018-12-11
Payer: MEDICARE

## 2018-12-11 DIAGNOSIS — Z00.00 PREVENTATIVE HEALTH CARE: ICD-10-CM

## 2018-12-11 PROCEDURE — 90732 PPSV23 VACC 2 YRS+ SUBQ/IM: CPT | Mod: PBBFAC

## 2018-12-11 NOTE — PROGRESS NOTES
Patient arrives for immunization injection of Pneumococcal 23 vaccine - administered per order - patient understands need to remain on campus for at least 15 minutes and report any reaction to staff - left in no apparent distress.

## 2018-12-31 ENCOUNTER — TELEPHONE (OUTPATIENT)
Dept: INFECTIOUS DISEASES | Facility: CLINIC | Age: 68
End: 2018-12-31

## 2018-12-31 NOTE — TELEPHONE ENCOUNTER
Attempted to contact pt about receiving the shingles vaccine but no answer. A voice message was left with contact information for scheduling.

## 2019-01-10 ENCOUNTER — LAB VISIT (OUTPATIENT)
Dept: LAB | Facility: HOSPITAL | Age: 69
End: 2019-01-10
Attending: INTERNAL MEDICINE
Payer: MEDICARE

## 2019-01-10 ENCOUNTER — CLINICAL SUPPORT (OUTPATIENT)
Dept: INFECTIOUS DISEASES | Facility: CLINIC | Age: 69
End: 2019-01-10
Payer: MEDICARE

## 2019-01-10 DIAGNOSIS — E05.90 HYPERTHYROIDISM: ICD-10-CM

## 2019-01-10 DIAGNOSIS — E78.00 HYPERCHOLESTEREMIA: ICD-10-CM

## 2019-01-10 DIAGNOSIS — M80.00XA AGE-RELATED OSTEOPOROSIS WITH CURRENT PATHOLOGICAL FRACTURE, INITIAL ENCOUNTER: ICD-10-CM

## 2019-01-10 DIAGNOSIS — E78.00 HYPERCHOLESTEREMIA: Primary | ICD-10-CM

## 2019-01-10 LAB
25(OH)D3+25(OH)D2 SERPL-MCNC: 19 NG/ML
ALBUMIN SERPL BCP-MCNC: 3.6 G/DL
ALP SERPL-CCNC: 72 U/L
ALT SERPL W/O P-5'-P-CCNC: 67 U/L
ANION GAP SERPL CALC-SCNC: 9 MMOL/L
AST SERPL-CCNC: 59 U/L
BASOPHILS # BLD AUTO: 0.06 K/UL
BASOPHILS NFR BLD: 0.9 %
BILIRUB SERPL-MCNC: 0.6 MG/DL
BUN SERPL-MCNC: 14 MG/DL
CALCIUM SERPL-MCNC: 9.7 MG/DL
CHLORIDE SERPL-SCNC: 103 MMOL/L
CO2 SERPL-SCNC: 26 MMOL/L
CREAT SERPL-MCNC: 0.8 MG/DL
DIFFERENTIAL METHOD: NORMAL
EOSINOPHIL # BLD AUTO: 0.2 K/UL
EOSINOPHIL NFR BLD: 2.2 %
ERYTHROCYTE [DISTWIDTH] IN BLOOD BY AUTOMATED COUNT: 12.6 %
EST. GFR  (AFRICAN AMERICAN): >60 ML/MIN/1.73 M^2
EST. GFR  (NON AFRICAN AMERICAN): >60 ML/MIN/1.73 M^2
GLUCOSE SERPL-MCNC: 88 MG/DL
HCT VFR BLD AUTO: 42.1 %
HGB BLD-MCNC: 13.7 G/DL
IMM GRANULOCYTES # BLD AUTO: 0.03 K/UL
IMM GRANULOCYTES NFR BLD AUTO: 0.4 %
LYMPHOCYTES # BLD AUTO: 2.3 K/UL
LYMPHOCYTES NFR BLD: 33 %
MCH RBC QN AUTO: 30.6 PG
MCHC RBC AUTO-ENTMCNC: 32.5 G/DL
MCV RBC AUTO: 94 FL
MONOCYTES # BLD AUTO: 0.7 K/UL
MONOCYTES NFR BLD: 10.5 %
NEUTROPHILS # BLD AUTO: 3.7 K/UL
NEUTROPHILS NFR BLD: 53 %
NRBC BLD-RTO: 0 /100 WBC
PLATELET # BLD AUTO: 339 K/UL
PMV BLD AUTO: 10.5 FL
POTASSIUM SERPL-SCNC: 4.3 MMOL/L
PROT SERPL-MCNC: 7.7 G/DL
RBC # BLD AUTO: 4.47 M/UL
SODIUM SERPL-SCNC: 138 MMOL/L
T4 FREE SERPL-MCNC: 0.95 NG/DL
TSH SERPL DL<=0.005 MIU/L-ACNC: 4.83 UIU/ML
WBC # BLD AUTO: 6.88 K/UL

## 2019-01-10 PROCEDURE — 80053 COMPREHEN METABOLIC PANEL: CPT

## 2019-01-10 PROCEDURE — 36415 COLL VENOUS BLD VENIPUNCTURE: CPT

## 2019-01-10 PROCEDURE — 82306 VITAMIN D 25 HYDROXY: CPT

## 2019-01-10 PROCEDURE — 84443 ASSAY THYROID STIM HORMONE: CPT

## 2019-01-10 PROCEDURE — 84439 ASSAY OF FREE THYROXINE: CPT

## 2019-01-10 PROCEDURE — 90750 HZV VACC RECOMBINANT IM: CPT | Mod: PBBFAC

## 2019-01-10 PROCEDURE — 85025 COMPLETE CBC W/AUTO DIFF WBC: CPT

## 2019-01-10 NOTE — PROGRESS NOTES
Ms. De La Torre has received first dose of the Shingles vaccine   Tolerated injection well  Return appointment scheduled   Left unit in NAD

## 2019-01-17 ENCOUNTER — PATIENT MESSAGE (OUTPATIENT)
Dept: ELECTROPHYSIOLOGY | Facility: CLINIC | Age: 69
End: 2019-01-17

## 2019-01-17 DIAGNOSIS — I47.10 SVT (SUPRAVENTRICULAR TACHYCARDIA): Primary | ICD-10-CM

## 2019-02-12 ENCOUNTER — LAB VISIT (OUTPATIENT)
Dept: LAB | Facility: HOSPITAL | Age: 69
End: 2019-02-12
Attending: INTERNAL MEDICINE
Payer: MEDICARE

## 2019-02-12 DIAGNOSIS — E78.00 HYPERCHOLESTEREMIA: ICD-10-CM

## 2019-02-12 LAB
CHOLEST SERPL-MCNC: 249 MG/DL
CHOLEST/HDLC SERPL: 4.2 {RATIO}
HDLC SERPL-MCNC: 59 MG/DL
HDLC SERPL: 23.7 %
LDLC SERPL CALC-MCNC: 157.4 MG/DL
NONHDLC SERPL-MCNC: 190 MG/DL
TRIGL SERPL-MCNC: 163 MG/DL

## 2019-02-12 PROCEDURE — 80061 LIPID PANEL: CPT

## 2019-02-12 PROCEDURE — 36415 COLL VENOUS BLD VENIPUNCTURE: CPT | Mod: PO

## 2019-03-07 ENCOUNTER — HOSPITAL ENCOUNTER (OUTPATIENT)
Dept: RADIOLOGY | Facility: HOSPITAL | Age: 69
Discharge: HOME | End: 2019-03-07
Attending: INTERNAL MEDICINE
Payer: MEDICARE

## 2019-03-07 ENCOUNTER — OFFICE VISIT (OUTPATIENT)
Dept: INFECTIOUS DISEASES | Facility: CLINIC | Age: 69
End: 2019-03-07
Payer: MEDICARE

## 2019-03-07 ENCOUNTER — HOSPITAL ENCOUNTER (OUTPATIENT)
Dept: CARDIOLOGY | Facility: CLINIC | Age: 69
Discharge: HOME | End: 2019-03-07
Payer: MEDICARE

## 2019-03-07 VITALS
WEIGHT: 199.31 LBS | HEIGHT: 67 IN | TEMPERATURE: 98 F | HEART RATE: 49 BPM | SYSTOLIC BLOOD PRESSURE: 138 MMHG | BODY MASS INDEX: 31.28 KG/M2 | DIASTOLIC BLOOD PRESSURE: 73 MMHG

## 2019-03-07 DIAGNOSIS — E78.00 HYPERCHOLESTEROLEMIA: Chronic | ICD-10-CM

## 2019-03-07 DIAGNOSIS — E03.4 HYPOTHYROIDISM DUE TO ACQUIRED ATROPHY OF THYROID: ICD-10-CM

## 2019-03-07 DIAGNOSIS — I47.10 SVT (SUPRAVENTRICULAR TACHYCARDIA): ICD-10-CM

## 2019-03-07 DIAGNOSIS — L65.9 ALOPECIA: ICD-10-CM

## 2019-03-07 DIAGNOSIS — Z91.89 AT RISK FOR CORONARY ARTERY DISEASE: ICD-10-CM

## 2019-03-07 DIAGNOSIS — R74.8 ELEVATED LIVER ENZYMES: ICD-10-CM

## 2019-03-07 DIAGNOSIS — R79.89 ELEVATED LFTS: ICD-10-CM

## 2019-03-07 DIAGNOSIS — R74.8 ELEVATED LIVER ENZYMES: Primary | ICD-10-CM

## 2019-03-07 PROBLEM — M79.642 LEFT HAND PAIN: Status: RESOLVED | Noted: 2017-07-25 | Resolved: 2019-03-07

## 2019-03-07 PROCEDURE — 99999 PR PBB SHADOW E&M-EST. PATIENT-LVL IV: ICD-10-PCS | Mod: PBBFAC,,, | Performed by: INTERNAL MEDICINE

## 2019-03-07 PROCEDURE — 93010 RHYTHM STRIP: ICD-10-PCS | Mod: S$PBB,,, | Performed by: INTERNAL MEDICINE

## 2019-03-07 PROCEDURE — 75571 CT HRT W/O DYE W/CA TEST: CPT | Mod: 26,,, | Performed by: RADIOLOGY

## 2019-03-07 PROCEDURE — 76705 ECHO EXAM OF ABDOMEN: CPT | Mod: TC

## 2019-03-07 PROCEDURE — 75571 CT HRT W/O DYE W/CA TEST: CPT | Mod: TC

## 2019-03-07 PROCEDURE — 99999 PR PBB SHADOW E&M-EST. PATIENT-LVL IV: CPT | Mod: PBBFAC,,, | Performed by: INTERNAL MEDICINE

## 2019-03-07 PROCEDURE — 93010 ELECTROCARDIOGRAM REPORT: CPT | Mod: S$PBB,,, | Performed by: INTERNAL MEDICINE

## 2019-03-07 PROCEDURE — 75571 CT CALCIUM SCORING CARDIAC: ICD-10-PCS | Mod: 26,,, | Performed by: RADIOLOGY

## 2019-03-07 PROCEDURE — 99215 OFFICE O/P EST HI 40 MIN: CPT | Mod: S$PBB,,, | Performed by: INTERNAL MEDICINE

## 2019-03-07 PROCEDURE — 76705 ECHO EXAM OF ABDOMEN: CPT | Mod: 26,,, | Performed by: RADIOLOGY

## 2019-03-07 PROCEDURE — 99214 OFFICE O/P EST MOD 30 MIN: CPT | Mod: PBBFAC,25 | Performed by: INTERNAL MEDICINE

## 2019-03-07 PROCEDURE — 99215 PR OFFICE/OUTPT VISIT, EST, LEVL V, 40-54 MIN: ICD-10-PCS | Mod: S$PBB,,, | Performed by: INTERNAL MEDICINE

## 2019-03-07 PROCEDURE — 76705 US ABDOMEN LIMITED: ICD-10-PCS | Mod: 26,,, | Performed by: RADIOLOGY

## 2019-03-07 PROCEDURE — 93005 ELECTROCARDIOGRAM TRACING: CPT | Mod: 59,PBBFAC | Performed by: INTERNAL MEDICINE

## 2019-03-07 RX ORDER — ROSUVASTATIN CALCIUM 10 MG/1
10 TABLET, COATED ORAL NIGHTLY
Qty: 30 TABLET | Refills: 11 | Status: SHIPPED | OUTPATIENT
Start: 2019-03-07 | End: 2021-03-08 | Stop reason: SDUPTHER

## 2019-03-07 RX ORDER — ATENOLOL 50 MG/1
50 TABLET ORAL 2 TIMES DAILY
Qty: 180 TABLET | Refills: 3 | Status: SHIPPED | OUTPATIENT
Start: 2019-03-07 | End: 2021-02-10 | Stop reason: SDUPTHER

## 2019-03-07 NOTE — PROGRESS NOTES
Subjective:      Patient ID: Viv De La Torre is a 69 y.o. female.    Chief Complaint:  Follow up on multiple problems      History of Present Illness    SVT (supraventricular tachycardia)  Pt has hx of SVT. She has been seen by Dr. Blaine Driver who has placed her on atenolol and since being on this, she has had rare episodes of tachycardia. Is supposed to be on it bid but is taking on only once daily. Encouraged her to take as prescribed. She has only 0-1 episodes per year.      Hypothyroidism due to acquired atrophy of thyroid  Pt has had elevated TSH in past and was placed on thyroxine.  Lab Results   Component Value Date    TSH 4.825 (H) 01/10/2019   FT4= 0.95 which is normal.     Alopecia of scalp  Pt has had this for at least 6 years. I offered her a derm appt but she said they had not been helpful in past. She wondered if it were endocrine related, so I will refer her to endo for consultation.      Hypercholesterolemia   Coronary calcium score in 2012 was 0. She has a family hx of heart disease, not clear exactly what types.  She is on pravastatin 40 mg daily. Most recent past lipids:  Lab Results   Component Value Date    CHOL 249 (H) 02/12/2019    CHOL 241 (H) 05/18/2017    CHOL 208 (H) 10/28/2016     Lab Results   Component Value Date    HDL 59 02/12/2019    HDL 49 05/18/2017    HDL 54 10/28/2016     Lab Results   Component Value Date    LDLCALC 157.4 02/12/2019    LDLCALC 156.2 05/18/2017    LDLCALC 106.0 10/28/2016     Lab Results   Component Value Date    TRIG 163 (H) 02/12/2019    TRIG 179 (H) 05/18/2017    TRIG 240 (H) 10/28/2016     Lab Results   Component Value Date    CHOLHDL 23.7 02/12/2019    CHOLHDL 20.3 05/18/2017    CHOLHDL 26.0 10/28/2016     Had atorvastatin many years ago and had muscle cramps, so was defaulted to prava.  Since she is not near target, will give her a trial of rosuvastatin 10 mg daily and recheck lipids in 2 months.     Elevated LFTs  She has had mild elevation of LFTs for at  least 3 years. She drinks fairly heavily 4 oz vodka 3-4 days per week. Likely has fatty liver. Will get liver ultrasound to see what additional eval is needed     Elevated BMI  BMI is 31.2      Review of Systems   Constitution: Negative for chills, decreased appetite, fever, weakness, malaise/fatigue, night sweats, weight gain and weight loss.   HENT: Negative for congestion, ear pain, hearing loss, hoarse voice, sore throat and tinnitus.    Eyes: Negative for blurred vision, redness and visual disturbance.   Cardiovascular: Negative for chest pain, leg swelling and palpitations.   Respiratory: Negative for cough, hemoptysis, shortness of breath and sputum production.    Hematologic/Lymphatic: Negative for adenopathy. Bruises/bleeds easily.   Skin: Negative for dry skin, itching, rash and suspicious lesions.   Musculoskeletal: Negative for back pain, joint pain, myalgias and neck pain.   Gastrointestinal: Negative for abdominal pain, constipation, diarrhea, heartburn, nausea and vomiting.   Genitourinary: Negative for dysuria, flank pain, frequency, hematuria, hesitancy and urgency.   Neurological: Negative for dizziness, headaches, numbness and paresthesias.   Psychiatric/Behavioral: Negative for depression and memory loss. The patient does not have insomnia and is not nervous/anxious.      Objective:   Physical Exam   Constitutional: She is oriented to person, place, and time. She appears well-developed and well-nourished.   overweight   HENT:   Right Ear: External ear normal.   Left Ear: External ear normal.   Mouth/Throat: Oropharynx is clear and moist.   Neck: Carotid bruit is not present. No thyroid mass and no thyromegaly present.   Cardiovascular: Normal rate, regular rhythm and normal heart sounds. Exam reveals no gallop and no friction rub.   No murmur heard.  Pulmonary/Chest: Effort normal and breath sounds normal. No respiratory distress. She has no wheezes. She has no rales.   Abdominal: Soft. Bowel  sounds are normal. She exhibits no distension and no mass. There is no splenomegaly or hepatomegaly. There is no tenderness.   Musculoskeletal: She exhibits no edema or tenderness.   Lymphadenopathy:     She has no cervical adenopathy.   Neurological: She is alert and oriented to person, place, and time.   Skin: No rash noted. No erythema.   Psychiatric: She has a normal mood and affect.   Vitals reviewed.    Assessment:       1. Elevated liver enzymes    2. At risk for coronary artery disease    3. Alopecia    4. Elevated LFTs    5. BMI 31.0-31.9,adult    6. Hypothyroidism due to acquired atrophy of thyroid    7. Hypercholesterolemia    8. SVT (supraventricular tachycardia)          Plan:        1. Endocrine appt re alopecia   2. DC pravachol and start crestor 10 mg daily   3. Coronary calcium score   4. Liver ultrasound   5. Needs f/u lipids/CPK and ALT in 2 mos

## 2019-03-12 ENCOUNTER — CLINICAL SUPPORT (OUTPATIENT)
Dept: INFECTIOUS DISEASES | Facility: CLINIC | Age: 69
End: 2019-03-12
Payer: MEDICARE

## 2019-03-12 DIAGNOSIS — Z00.00 PREVENTATIVE HEALTH CARE: ICD-10-CM

## 2019-03-12 PROCEDURE — 90750 HZV VACC RECOMBINANT IM: CPT | Mod: PBBFAC

## 2019-03-25 DIAGNOSIS — I49.3 PVC'S (PREMATURE VENTRICULAR CONTRACTIONS): Primary | ICD-10-CM

## 2019-03-26 ENCOUNTER — CLINICAL SUPPORT (OUTPATIENT)
Dept: CARDIOLOGY | Facility: CLINIC | Age: 69
End: 2019-03-26
Attending: INTERNAL MEDICINE
Payer: MEDICARE

## 2019-03-26 VITALS
HEIGHT: 67 IN | SYSTOLIC BLOOD PRESSURE: 135 MMHG | WEIGHT: 199 LBS | DIASTOLIC BLOOD PRESSURE: 70 MMHG | BODY MASS INDEX: 31.23 KG/M2 | HEART RATE: 50 BPM

## 2019-03-26 DIAGNOSIS — I49.3 PVC'S (PREMATURE VENTRICULAR CONTRACTIONS): ICD-10-CM

## 2019-03-26 LAB
ASCENDING AORTA: 3.53 CM
AV INDEX (PROSTH): 0.74
AV MEAN GRADIENT: 5.58 MMHG
AV PEAK GRADIENT: 10.37 MMHG
AV VALVE AREA: 2.86 CM2
AV VELOCITY RATIO: 0.75
BSA FOR ECHO PROCEDURE: 2.07 M2
CV ECHO LV RWT: 0.42 CM
DOP CALC AO PEAK VEL: 1.61 M/S
DOP CALC AO VTI: 40.39 CM
DOP CALC LVOT AREA: 3.87 CM2
DOP CALC LVOT DIAMETER: 2.22 CM
DOP CALC LVOT PEAK VEL: 1.2 M/S
DOP CALC LVOT STROKE VOLUME: 115.56 CM3
DOP CALCLVOT PEAK VEL VTI: 29.87 CM
E WAVE DECELERATION TIME: 191.47 MSEC
E/A RATIO: 1.87
E/E' RATIO: 13.2
ECHO LV POSTERIOR WALL: 1.08 CM (ref 0.6–1.1)
FRACTIONAL SHORTENING: 40 % (ref 28–44)
INTERVENTRICULAR SEPTUM: 1.16 CM (ref 0.6–1.1)
IVRT: 0.07 MSEC
LA MAJOR: 5.49 CM
LA MINOR: 5.11 CM
LA WIDTH: 4.91 CM
LEFT ATRIUM SIZE: 4.22 CM
LEFT ATRIUM VOLUME INDEX: 46.2 ML/M2
LEFT ATRIUM VOLUME: 93.22 CM3
LEFT INTERNAL DIMENSION IN SYSTOLE: 3.08 CM (ref 2.1–4)
LEFT VENTRICLE DIASTOLIC VOLUME INDEX: 63.3 ML/M2
LEFT VENTRICLE DIASTOLIC VOLUME: 127.73 ML
LEFT VENTRICLE MASS INDEX: 111.1 G/M2
LEFT VENTRICLE SYSTOLIC VOLUME INDEX: 18.5 ML/M2
LEFT VENTRICLE SYSTOLIC VOLUME: 37.37 ML
LEFT VENTRICULAR INTERNAL DIMENSION IN DIASTOLE: 5.17 CM (ref 3.5–6)
LEFT VENTRICULAR MASS: 224.14 G
LV LATERAL E/E' RATIO: 9.9
LV SEPTAL E/E' RATIO: 19.8
MV PEAK A VEL: 0.53 M/S
MV PEAK E VEL: 0.99 M/S
PISA TR MAX VEL: 2.67 M/S
PULM VEIN S/D RATIO: 1.19
PV PEAK D VEL: 0.42 M/S
PV PEAK S VEL: 0.5 M/S
RA MAJOR: 4.85 CM
RA PRESSURE: 3 MMHG
RA WIDTH: 3.7 CM
RIGHT VENTRICULAR END-DIASTOLIC DIMENSION: 3.63 CM
RV TISSUE DOPPLER FREE WALL SYSTOLIC VELOCITY 1 (APICAL 4 CHAMBER VIEW): 12.01 M/S
SINUS: 3.4 CM
STJ: 3.48 CM
TDI LATERAL: 0.1
TDI SEPTAL: 0.05
TDI: 0.08
TR MAX PG: 28.52 MMHG
TRICUSPID ANNULAR PLANE SYSTOLIC EXCURSION: 1.98 CM
TV REST PULMONARY ARTERY PRESSURE: 32 MMHG

## 2019-03-26 PROCEDURE — 99212 OFFICE O/P EST SF 10 MIN: CPT | Mod: PBBFAC,PO,25

## 2019-03-26 PROCEDURE — 93306 TRANSTHORACIC ECHO (TTE) COMPLETE (CUPID ONLY): ICD-10-PCS | Mod: 26,S$PBB,, | Performed by: INTERNAL MEDICINE

## 2019-03-26 PROCEDURE — 93306 TTE W/DOPPLER COMPLETE: CPT | Mod: PBBFAC,PO | Performed by: INTERNAL MEDICINE

## 2019-03-26 PROCEDURE — 99999 PR PBB SHADOW E&M-EST. PATIENT-LVL II: ICD-10-PCS | Mod: PBBFAC,,,

## 2019-03-26 PROCEDURE — 99999 PR PBB SHADOW E&M-EST. PATIENT-LVL II: CPT | Mod: PBBFAC,,,

## 2019-03-26 NOTE — PROGRESS NOTES
Subjective:    Patient ID:  Viv De La Torre is a 69 y.o. female who presents for follow-up of SVT      HPI 70 yo female with SVT, PVC's.  She is an organic ,  Reports history of intermittent palpitations, over the past 20 years. Associated with chest and back tightness. Generally would last 10-15 minutes. Can often break it with vagal maneuvers.   Presented to [facility] with sustained symptoms lasting 30 minutes. Symptoms resolved prior to being evaluated.  Spect stress 10/28/16 negative.  Echo 10/28/16 normal biventricular structure and function.  Had sustained symptoms >> event monitor reveals SVT at 180 bpm. This one was associated with dizziness and diaphoresis. Was in her car with her granddaughter, had to pull over.  Had breakthrough on metoprolol 12.5 mg BID.  Switched to Atenolol 50 mg BID.  Has had one episode of palpitations lasting 5 seconds over the past year.  Feeling well overall.  Denies syncope, dizziness.    Has only been taking Atenolol once daily.  Echo 3/26/19 normal biventricular structure and function mild to moderate MR VIC 46  ECG reveals sinus bradycardia at 49 bpm.      Review of Systems   Constitution: Negative. Negative for malaise/fatigue.   Cardiovascular: Positive for palpitations. Negative for chest pain, dyspnea on exertion, irregular heartbeat, leg swelling, near-syncope, orthopnea, paroxysmal nocturnal dyspnea and syncope.   Respiratory: Positive for shortness of breath. Negative for cough.    Neurological: Negative for dizziness, light-headedness and weakness.   All other systems reviewed and are negative.       Objective:    Physical Exam   Constitutional: She is oriented to person, place, and time. She appears well-developed and well-nourished.   Eyes: Conjunctivae are normal. No scleral icterus.   Neck: No JVD present. No tracheal deviation present.   Cardiovascular: Regular rhythm. Bradycardia present. PMI is not displaced.   Pulmonary/Chest: Effort normal and breath sounds  normal. No respiratory distress.   Abdominal: Soft. There is no hepatosplenomegaly. There is no tenderness.   Musculoskeletal: She exhibits no edema or tenderness.   Neurological: She is alert and oriented to person, place, and time.   Skin: Skin is warm and dry. No rash noted.   Psychiatric: She has a normal mood and affect. Her behavior is normal.         Assessment:       1. SVT (supraventricular tachycardia)    2. PVC (premature ventricular contraction)    3. Depression, unspecified depression type    4. Hypothyroidism due to acquired atrophy of thyroid         Plan:           Doing well overall.  Continue beta blocker.  As she is having minimal symptoms taking Atenolol once daily, I believe this is reasonable to continue.  F/u in one year

## 2019-03-27 ENCOUNTER — OFFICE VISIT (OUTPATIENT)
Dept: ELECTROPHYSIOLOGY | Facility: CLINIC | Age: 69
End: 2019-03-27
Payer: MEDICARE

## 2019-03-27 ENCOUNTER — HOSPITAL ENCOUNTER (OUTPATIENT)
Dept: CARDIOLOGY | Facility: CLINIC | Age: 69
Discharge: HOME | End: 2019-03-27
Payer: MEDICARE

## 2019-03-27 VITALS
SYSTOLIC BLOOD PRESSURE: 110 MMHG | DIASTOLIC BLOOD PRESSURE: 62 MMHG | BODY MASS INDEX: 29.9 KG/M2 | WEIGHT: 197.31 LBS | HEIGHT: 68 IN | HEART RATE: 49 BPM

## 2019-03-27 DIAGNOSIS — I49.3 PVC (PREMATURE VENTRICULAR CONTRACTION): ICD-10-CM

## 2019-03-27 DIAGNOSIS — F32.A DEPRESSION, UNSPECIFIED DEPRESSION TYPE: ICD-10-CM

## 2019-03-27 DIAGNOSIS — I47.10 SVT (SUPRAVENTRICULAR TACHYCARDIA): Primary | ICD-10-CM

## 2019-03-27 DIAGNOSIS — I47.10 SVT (SUPRAVENTRICULAR TACHYCARDIA): ICD-10-CM

## 2019-03-27 DIAGNOSIS — E03.4 HYPOTHYROIDISM DUE TO ACQUIRED ATROPHY OF THYROID: ICD-10-CM

## 2019-03-27 PROCEDURE — 99213 PR OFFICE/OUTPT VISIT, EST, LEVL III, 20-29 MIN: ICD-10-PCS | Mod: S$PBB,,, | Performed by: INTERNAL MEDICINE

## 2019-03-27 PROCEDURE — 99999 PR PBB SHADOW E&M-EST. PATIENT-LVL III: ICD-10-PCS | Mod: PBBFAC,,, | Performed by: INTERNAL MEDICINE

## 2019-03-27 PROCEDURE — 93005 ELECTROCARDIOGRAM TRACING: CPT | Mod: PBBFAC | Performed by: INTERNAL MEDICINE

## 2019-03-27 PROCEDURE — 93010 ELECTROCARDIOGRAM REPORT: CPT | Mod: S$PBB,,, | Performed by: INTERNAL MEDICINE

## 2019-03-27 PROCEDURE — 99999 PR PBB SHADOW E&M-EST. PATIENT-LVL III: CPT | Mod: PBBFAC,,, | Performed by: INTERNAL MEDICINE

## 2019-03-27 PROCEDURE — 99213 OFFICE O/P EST LOW 20 MIN: CPT | Mod: S$PBB,,, | Performed by: INTERNAL MEDICINE

## 2019-03-27 PROCEDURE — 99213 OFFICE O/P EST LOW 20 MIN: CPT | Mod: PBBFAC,25 | Performed by: INTERNAL MEDICINE

## 2019-03-27 PROCEDURE — 93010 RHYTHM STRIP: ICD-10-PCS | Mod: S$PBB,,, | Performed by: INTERNAL MEDICINE

## 2019-08-30 RX ORDER — ESTRADIOL 2 MG/1
TABLET ORAL
Qty: 90 TABLET | Refills: 3 | Status: SHIPPED | OUTPATIENT
Start: 2019-08-30

## 2019-09-10 ENCOUNTER — OFFICE VISIT (OUTPATIENT)
Dept: ENDOCRINOLOGY | Facility: CLINIC | Age: 69
End: 2019-09-10
Payer: MEDICARE

## 2019-09-10 VITALS
SYSTOLIC BLOOD PRESSURE: 123 MMHG | WEIGHT: 192 LBS | DIASTOLIC BLOOD PRESSURE: 76 MMHG | BODY MASS INDEX: 29.1 KG/M2 | HEIGHT: 68 IN | HEART RATE: 80 BPM

## 2019-09-10 DIAGNOSIS — E03.4 HYPOTHYROIDISM DUE TO ACQUIRED ATROPHY OF THYROID: Primary | ICD-10-CM

## 2019-09-10 DIAGNOSIS — R74.8 ELEVATED LIVER ENZYMES: ICD-10-CM

## 2019-09-10 DIAGNOSIS — L65.9 ALOPECIA OF SCALP: ICD-10-CM

## 2019-09-10 PROCEDURE — 99213 OFFICE O/P EST LOW 20 MIN: CPT | Mod: PBBFAC | Performed by: INTERNAL MEDICINE

## 2019-09-10 PROCEDURE — 99204 OFFICE O/P NEW MOD 45 MIN: CPT | Mod: S$PBB,,, | Performed by: INTERNAL MEDICINE

## 2019-09-10 PROCEDURE — 99999 PR PBB SHADOW E&M-EST. PATIENT-LVL III: CPT | Mod: PBBFAC,,, | Performed by: INTERNAL MEDICINE

## 2019-09-10 PROCEDURE — 99999 PR PBB SHADOW E&M-EST. PATIENT-LVL III: ICD-10-PCS | Mod: PBBFAC,,, | Performed by: INTERNAL MEDICINE

## 2019-09-10 PROCEDURE — 99204 PR OFFICE/OUTPT VISIT, NEW, LEVL IV, 45-59 MIN: ICD-10-PCS | Mod: S$PBB,,, | Performed by: INTERNAL MEDICINE

## 2019-09-10 RX ORDER — LEVOTHYROXINE SODIUM 25 UG/1
25 TABLET ORAL
Qty: 90 TABLET | Refills: 3 | Status: SHIPPED | OUTPATIENT
Start: 2019-09-10 | End: 2020-09-13

## 2019-09-10 NOTE — LETTER
September 10, 2019      Jero Hale MD  [address]  [facility] [address]           [facility] [address]  [address]  [facility] [address]  Phone: [phone]          Patient: Viv De La Torre   MR Number: [id number]   YOB: 1950   Date of Visit: 9/10/2019       Dear Dr. Jero Hale:    Thank you for referring Viv De La Torre to me for evaluation. Attached you will find relevant portions of my assessment and plan of care.    If you have questions, please do not hesitate to call me. I look forward to following Viv De La Torre along with you.    Sincerely,    Ash Burton MD    Enclosure  CC:  No Recipients    If you would like to receive this communication electronically, please contact [email or web address] or [phone] to request more information on [facility] access.    For providers and/or their staff who would like to refer a patient to [facility], please contact us through our one-stop-shop provider referral line, [facility], at [phone].    If you feel you have received this communication in error or would no longer like to receive these types of communications, please e-mail [email or web address]

## 2019-09-10 NOTE — ASSESSMENT & PLAN NOTE
Likely related to EtOH intake +/- NAFLD. Discussed reduction in EtOH intake may improve liver enzymes and slow progression of liver disease.

## 2019-09-10 NOTE — PROGRESS NOTES
Subjective:      Chief Complaint: Referred for hypothyroidism    HPI: Viv De La Torre is a 69 y.o. female who is here for an initial evaluation for hypothyroidism and alopecia    With regards to her hypothyroidism:    Currentmedication:  Generic levothyroxine 25 mcg daily - same dose since age 42.  Also on estrogen - has been taking since 42. She knows about health risks related to estrogen, but does not want to ever stop taking it. +Hysterectomy.    Takes thyroid medication properly without food first thing in the morning    Current symptoms:   Weight gain and hair loss   She reports worsening weight gain since fracturing her left ankle a few months ago. She's been trying to eat healthier.   She stepped on an uneven sidewalk in [place] and twisted her ankle - sustained fracture of lateral malleolus requiring ORIF.    Last BMD: 2018 - normal BMD    Drinks 1-2 alcoholic drinks 5 days/week - vodka or beer.  AST/ALT chronically elevated. Liver U/S with evidence of fatty liver.    She's had progressive scalp hair loss for years. She's seen several dermatologists and tried biotin and other topical treatments without any improvement.    Reviewed past medical, family, social history and updated as appropriate.    Review of Systems   Constitutional: Negative for unexpected weight change.   Eyes: Negative for visual disturbance.   Respiratory: Negative for shortness of breath.    Cardiovascular: Negative for chest pain.   Gastrointestinal: Negative for abdominal pain.   Genitourinary: Negative for urgency.   Musculoskeletal: Negative for arthralgias.   Skin: Negative for wound.   Neurological: Negative for headaches.   Hematological: Does not bruise/bleed easily.   Psychiatric/Behavioral: Negative for sleep disturbance.     Objective:     Vitals:    09/10/19 1501   BP: 123/76   Pulse: 80     BP Readings from Last 5 Encounters:   09/10/19 123/76   03/27/19 110/62   03/26/19 135/70   03/07/19 138/73   08/01/17 127/71      Physical Exam   Constitutional: She is oriented to person, place, and time. She appears well-developed.   HENT:   Right Ear: External ear normal.   Left Ear: External ear normal.   Nose: Nose normal.   Hearing grossly normal  Dentition grossly normal   Eyes: Conjunctivae are normal. Right eye exhibits no discharge. Left eye exhibits no discharge.   Neck: No tracheal deviation present. No thyromegaly present.   Cardiovascular: Normal rate.   No murmur heard.  Pulmonary/Chest: Effort normal and breath sounds normal.   Musculoskeletal: She exhibits no edema.   No digital clubbing or extremity cyanosis   Neurological: She is alert and oriented to person, place, and time. Coordination normal.   Skin: No rash noted.   No subcutaneous nodules noted.  Female pattern hair loss. No temporal or frontal hair line recession.   Psychiatric: She has a normal mood and affect. Her behavior is normal.   Alert and oriented to person, place, and situation.   Nursing note and vitals reviewed.      Wt Readings from Last 10 Encounters:   09/10/19 1501 87.1 kg (192 lb 0.3 oz)   03/27/19 1440 89.5 kg (197 lb 5 oz)   03/26/19 0900 90.3 kg (199 lb)   03/07/19 1116 90.4 kg (199 lb 4.7 oz)   10/03/18 0846 86.6 kg (191 lb)   09/18/18 0855 86.6 kg (191 lb)   08/01/17 1111 86.6 kg (191 lb)   07/26/17 1532 87 kg (191 lb 12.8 oz)   07/25/17 1144 88.5 kg (195 lb)   05/16/17 0934 88.7 kg (195 lb 8.8 oz)       Lab Results   Component Value Date    HGBA1C 5.3 10/28/2016     Lab Results   Component Value Date    CHOL 249 (H) 02/12/2019    CHOL 241 (H) 05/18/2017    HDL 59 02/12/2019    HDL 49 05/18/2017    LDLCALC 157.4 02/12/2019    LDLCALC 156.2 05/18/2017    TRIG 163 (H) 02/12/2019    TRIG 179 (H) 05/18/2017    CHOLHDL 23.7 02/12/2019    CHOLHDL 20.3 05/18/2017     Lab Results   Component Value Date     01/10/2019    K 4.3 01/10/2019     01/10/2019    CO2 26 01/10/2019    GLU 88 01/10/2019    BUN 14 01/10/2019    CREATININE 0.8  01/10/2019    CALCIUM 9.7 01/10/2019    PROT 7.7 01/10/2019    ALBUMIN 3.6 01/10/2019    BILITOT 0.6 01/10/2019    ALKPHOS 72 01/10/2019    AST 59 (H) 01/10/2019    ALT 67 (H) 01/10/2019    ANIONGAP 9 01/10/2019    ESTGFRAFRICA >60.0 01/10/2019    EGFRNONAA >60.0 01/10/2019    TSH 4.825 (H) 01/10/2019      No results found for: MICALBCREAT    Assessment/Plan:     Alopecia of scalp  Hairloss is common at this age, and unfortunately nothing works great. She's tried biotin and another topical without improvement. I suggested discussing with dermatology to see if there's anything else. She has no other signs of virilization or hirsutism, and she's on estrogen treatment regardless. No indication for further hormonal workup.    Hypothyroidism due to acquired atrophy of thyroid  Yearly TSH. Won't adjust levothyroxine based on TSH slightly above goal.    Elevated liver enzymes  Likely related to EtOH intake +/- NAFLD. Discussed reduction in EtOH intake may improve liver enzymes and slow progression of liver disease.      RTC in 1 year

## 2019-09-10 NOTE — ASSESSMENT & PLAN NOTE
Hairloss is common at this age, and unfortunately nothing works great. She's tried biotin and another topical without improvement. I suggested discussing with dermatology to see if there's anything else. She has no other signs of virilization or hirsutism, and she's on estrogen treatment regardless. No indication for further hormonal workup.

## 2019-09-19 ENCOUNTER — OFFICE VISIT (OUTPATIENT)
Dept: OPTOMETRY | Facility: CLINIC | Age: 69
End: 2019-09-19
Payer: MEDICARE

## 2019-09-19 DIAGNOSIS — H52.203 ASTIGMATISM WITH PRESBYOPIA, BILATERAL: ICD-10-CM

## 2019-09-19 DIAGNOSIS — D31.31 CHOROIDAL NEVUS OF RIGHT EYE: ICD-10-CM

## 2019-09-19 DIAGNOSIS — H25.13 NUCLEAR SCLEROSIS OF BOTH EYES: Primary | ICD-10-CM

## 2019-09-19 DIAGNOSIS — H52.4 ASTIGMATISM WITH PRESBYOPIA, BILATERAL: ICD-10-CM

## 2019-09-19 PROCEDURE — 92014 PR EYE EXAM, EST PATIENT,COMPREHESV: ICD-10-PCS | Mod: S$PBB,,, | Performed by: OPTOMETRIST

## 2019-09-19 PROCEDURE — 92014 COMPRE OPH EXAM EST PT 1/>: CPT | Mod: S$PBB,,, | Performed by: OPTOMETRIST

## 2019-09-19 PROCEDURE — 99212 OFFICE O/P EST SF 10 MIN: CPT | Mod: PBBFAC | Performed by: OPTOMETRIST

## 2019-09-19 PROCEDURE — 99999 PR PBB SHADOW E&M-EST. PATIENT-LVL II: CPT | Mod: PBBFAC,,, | Performed by: OPTOMETRIST

## 2019-09-19 PROCEDURE — 99999 PR PBB SHADOW E&M-EST. PATIENT-LVL II: ICD-10-PCS | Mod: PBBFAC,,, | Performed by: OPTOMETRIST

## 2019-09-19 NOTE — PROGRESS NOTES
HPI     Last eye exam was 4/19/18 with Dr. Hagen.  Patient states lost glasses 6 months ago and has noticed decrease in   distance vision. Prefers separate distance and near glasses.  Patient denies diplopia, headaches, flashes/floaters, and pain.        Last edited by Smitha Victoria on 9/19/2019  1:29 PM. (History)            Assessment /Plan     For exam results, see Encounter Report.    Nuclear sclerosis of both eyes    Choroidal nevus of right eye    Astigmatism with presbyopia, bilateral            1.   Educated on cataracts and affects on vision.  BCVA today 20/40 OU.  Cataracts don't look that bad.  Will have patient use artificial tears 2-3x/day OU and recheck refraction in a week.    2.  Longstanding-stable.   Retina flat and intact OU--no holes, tears, breaks, or RDs.    3.  No rx given at this time.  Will recheck in 1 week.

## 2019-09-27 ENCOUNTER — OFFICE VISIT (OUTPATIENT)
Dept: OPTOMETRY | Facility: CLINIC | Age: 69
End: 2019-09-27
Payer: MEDICARE

## 2019-09-27 ENCOUNTER — TELEPHONE (OUTPATIENT)
Dept: INFECTIOUS DISEASES | Facility: CLINIC | Age: 69
End: 2019-09-27

## 2019-09-27 DIAGNOSIS — H25.13 NUCLEAR SCLEROSIS OF BOTH EYES: Primary | ICD-10-CM

## 2019-09-27 DIAGNOSIS — H53.15 VISUAL DISTORTIONS OF SHAPE AND SIZE: ICD-10-CM

## 2019-09-27 PROCEDURE — 99999 PR PBB SHADOW E&M-EST. PATIENT-LVL II: CPT | Mod: PBBFAC,,, | Performed by: OPTOMETRIST

## 2019-09-27 PROCEDURE — 92134 POSTERIOR SEGMENT OCT RETINA (OCULAR COHERENCE TOMOGRAPHY)-BOTH EYES: ICD-10-PCS | Mod: 26,S$PBB,, | Performed by: OPTOMETRIST

## 2019-09-27 PROCEDURE — 99999 PR PBB SHADOW E&M-EST. PATIENT-LVL II: ICD-10-PCS | Mod: PBBFAC,,, | Performed by: OPTOMETRIST

## 2019-09-27 PROCEDURE — 99212 OFFICE O/P EST SF 10 MIN: CPT | Mod: PBBFAC,25 | Performed by: OPTOMETRIST

## 2019-09-27 PROCEDURE — 99499 UNLISTED E&M SERVICE: CPT | Mod: S$PBB,,, | Performed by: OPTOMETRIST

## 2019-09-27 PROCEDURE — 92134 CPTRZ OPH DX IMG PST SGM RTA: CPT | Mod: PBBFAC | Performed by: OPTOMETRIST

## 2019-09-27 PROCEDURE — 99499 NO LOS: ICD-10-PCS | Mod: S$PBB,,, | Performed by: OPTOMETRIST

## 2019-09-27 NOTE — PROGRESS NOTES
HPI     Last eye exam was 9/19/19 with     Pt states her cataracts have gotten dramatically worse since last year.   Pt states  wanted to recheck her VA in one week since the eye   drops.     Hemoglobin A1C       Date                     Value               Ref Range             Status                10/28/2016               5.3                 0.0 - 5.6 %           Final                   Last edited by Bre Willis on 9/27/2019 10:33 AM. (History)            Assessment /Plan     For exam results, see Encounter Report.    Nuclear sclerosis of both eyes    Visual distortions of shape and size     Other orders  -     OCT- Retina          1.  Artificial tears did not improve vision--very unhappy with vision.  Educated pt borderline qualifying for surgery.  Consult Dr. Acosta for cataract surgery.  2.  Macula normal OU.

## 2019-09-30 ENCOUNTER — IMMUNIZATION (OUTPATIENT)
Dept: FAMILY MEDICINE | Facility: CLINIC | Age: 69
End: 2019-09-30
Payer: MEDICARE

## 2019-09-30 PROCEDURE — 90662 IIV NO PRSV INCREASED AG IM: CPT | Mod: PBBFAC,PO

## 2019-10-03 ENCOUNTER — PATIENT MESSAGE (OUTPATIENT)
Dept: INFECTIOUS DISEASES | Facility: CLINIC | Age: 69
End: 2019-10-03

## 2019-10-16 ENCOUNTER — OFFICE VISIT (OUTPATIENT)
Dept: OPHTHALMOLOGY | Facility: CLINIC | Age: 69
End: 2019-10-16
Payer: MEDICARE

## 2019-10-16 DIAGNOSIS — H25.13 NUCLEAR SCLEROSIS, BILATERAL: Primary | ICD-10-CM

## 2019-10-16 PROCEDURE — 99999 PR PBB SHADOW E&M-EST. PATIENT-LVL III: ICD-10-PCS | Mod: PBBFAC,,, | Performed by: OPHTHALMOLOGY

## 2019-10-16 PROCEDURE — 99999 PR PBB SHADOW E&M-EST. PATIENT-LVL III: CPT | Mod: PBBFAC,,, | Performed by: OPHTHALMOLOGY

## 2019-10-16 PROCEDURE — 92014 PR EYE EXAM, EST PATIENT,COMPREHESV: ICD-10-PCS | Mod: S$PBB,,, | Performed by: OPHTHALMOLOGY

## 2019-10-16 PROCEDURE — 92136 IOL MASTER - MOD 26- OD- RIGHT EYE: ICD-10-PCS | Mod: 26,S$PBB,RT, | Performed by: OPHTHALMOLOGY

## 2019-10-16 PROCEDURE — 92014 COMPRE OPH EXAM EST PT 1/>: CPT | Mod: S$PBB,,, | Performed by: OPHTHALMOLOGY

## 2019-10-16 PROCEDURE — 99213 OFFICE O/P EST LOW 20 MIN: CPT | Mod: PBBFAC | Performed by: OPHTHALMOLOGY

## 2019-10-16 PROCEDURE — 92136 OPHTHALMIC BIOMETRY: CPT | Mod: PBBFAC | Performed by: OPHTHALMOLOGY

## 2019-10-16 RX ORDER — PREDNISOLONE ACETATE-GATIFLOXACIN-BROMFENAC .75; 5; 1 MG/ML; MG/ML; MG/ML
1 SUSPENSION/ DROPS OPHTHALMIC 3 TIMES DAILY
Qty: 5 ML | Refills: 3 | Status: SHIPPED | OUTPATIENT
Start: 2019-10-16 | End: 2022-03-28 | Stop reason: ALTCHOICE

## 2019-10-16 RX ORDER — TROPICAMIDE 10 MG/ML
1 SOLUTION/ DROPS OPHTHALMIC
Status: CANCELLED | OUTPATIENT
Start: 2019-10-16

## 2019-10-16 RX ORDER — TETRACAINE HYDROCHLORIDE 5 MG/ML
1 SOLUTION OPHTHALMIC
Status: CANCELLED | OUTPATIENT
Start: 2019-10-16

## 2019-10-16 RX ORDER — MOXIFLOXACIN 5 MG/ML
1 SOLUTION/ DROPS OPHTHALMIC
Status: CANCELLED | OUTPATIENT
Start: 2019-10-16

## 2019-10-16 RX ORDER — PHENYLEPHRINE HYDROCHLORIDE 25 MG/ML
1 SOLUTION/ DROPS OPHTHALMIC
Status: CANCELLED | OUTPATIENT
Start: 2019-10-16

## 2019-10-16 NOTE — PROGRESS NOTES
HPI     Pt was referred by Dr. Hagen for cataract evaluation OU.  Pt states for the past few months she noticed that her vision for distance   and near has decreased OU. Pt admits to night driving and having glare and   halos around lights OU. Pt denies any flashes or floaters OU. No pain but   may have dry eyes OU.    Pt confirms using  Tears PRN OU    Pt denies being diabetic   Hemoglobin A1C       Date                     Value               Ref Range             Status                10/28/2016               5.3                 0.0 - 5.6 %           Final                    Last edited by Maia Parnell on 10/16/2019  9:49 AM. (History)            Assessment /Plan     For exam results, see Encounter Report.    Nuclear sclerosis, bilateral      Visually Significant Cataract: Patient reports decreased vision consistent with the clinical amount of lenticular opacity, which reaches the level of visual significance and affects activities of daily living. Risks, benefits, and alternatives to cataract surgery were discussed and the consent reviewed. IOL options were discussed, including ATIOLs and the associated side effects and additional patient cost associated with them.   IOL Selections:   Right eye  IOL: TFNT0 (Panoptix) 22.0     Left eye  IOL: TFNT0 (Panoptix) 22.5    Pt wishes to have right eye done first.  The patient expresses a desire to reduce spectacle dependence. I reviewed various IOL and LASER refractive surgical options and we will attempt to minimize spectacle dependence by managing astigmatism and optimizing IOL selection. Femtosecond LASER assisted cataract surgery (FLACS) technology was explained to the patient with educational videos and discussion.  The patient voices understanding and wishes to implement this technology during the cataract procedure.  I explained the increased precision of the LASER versus manual techniques, especially as it relates to astigmatism reduction with arcuate  incisions.  I emphasized that although our goal is to reduce the need for refractive correction after surgery, there may still be a need for spectacle correction to achieve optimal visual acuity, and that a reasonable range of functional vision should be the expectation.  No guarantees are made about post operative refraction or visual acuity, as the eye may heal in unpredictable ways, and the standard risks, benefits, and alternatives to cataract surgery were explained.  The patient understands that the refractive portions of this cataract procedure are not covered by insurance, and that there is an out of pocket expense of $2250 per eye. I also explained that even though our pre-operative plan is to utilize advanced refractive technologies during surgery, that I may decide to eliminate part or all of this plan if surgical challenges or complications arise, or I feel that it is not in the patient's best interest. Consent forms and an ABN form were given to the patient to review.    Catalys Parameters:  Right Eye:   HUMBERTO:  12mm   ?Need to aida patient sitting up?: n  Capsulotomy: Scanned Capsule   stGstrstastdstest:st st1st Arcuate: OFF  Incisions: OFF  Left Eye:   UHMBERTO:  12mm   ?Need to aida patient sitting up?: n  Capsulotomy: Scanned Capsule  stGstrstastdstest:st st1st Arcuate:  OFF  Incisions:  OFF

## 2019-11-05 ENCOUNTER — TELEPHONE (OUTPATIENT)
Dept: OPHTHALMOLOGY | Facility: CLINIC | Age: 69
End: 2019-11-05

## 2019-11-05 DIAGNOSIS — H25.11 NUCLEAR SCLEROTIC CATARACT OF RIGHT EYE: Primary | ICD-10-CM

## 2019-11-05 RX ORDER — SERTRALINE HYDROCHLORIDE 100 MG/1
100 TABLET, FILM COATED ORAL DAILY
Qty: 30 TABLET | Refills: 12 | Status: SHIPPED | OUTPATIENT
Start: 2019-11-05 | End: 2021-03-10 | Stop reason: SDUPTHER

## 2019-12-04 ENCOUNTER — TELEPHONE (OUTPATIENT)
Dept: OPHTHALMOLOGY | Facility: CLINIC | Age: 69
End: 2019-12-04

## 2019-12-04 DIAGNOSIS — H25.12 NUCLEAR SCLEROTIC CATARACT OF LEFT EYE: Primary | ICD-10-CM

## 2019-12-12 ENCOUNTER — TELEPHONE (OUTPATIENT)
Dept: OPHTHALMOLOGY | Facility: CLINIC | Age: 69
End: 2019-12-12

## 2019-12-12 NOTE — TELEPHONE ENCOUNTER
----- Message from Elsy Olivia MA sent at 12/12/2019  9:02 AM CST -----  Contact: Pt      ----- Message -----  From: Shikha Ho  Sent: 12/12/2019   8:52 AM CST  To: Dave REYES Staff    Pt was calling to speak Melany Aleman.    Pt# [phone]

## 2019-12-13 ENCOUNTER — TELEPHONE (OUTPATIENT)
Dept: OPHTHALMOLOGY | Facility: CLINIC | Age: 69
End: 2019-12-13

## 2019-12-13 NOTE — TELEPHONE ENCOUNTER
Left vm for pt confirmed sx date for 12/19/2019. Arrival time for 11am. No food after 9pm the night before sx. But, they may have water, gatorade, or powerade after 9pm until leaving home the morning of sx. Start eye drops on Tuesday, one drop TID into operative eye.    TR 12/13/2019

## 2019-12-19 ENCOUNTER — HOSPITAL ENCOUNTER (OUTPATIENT)
Facility: OTHER | Age: 69
Discharge: HOME | End: 2019-12-19
Attending: OPHTHALMOLOGY | Admitting: OPHTHALMOLOGY
Payer: MEDICARE

## 2019-12-19 ENCOUNTER — ANESTHESIA EVENT (OUTPATIENT)
Dept: SURGERY | Facility: OTHER | Age: 69
End: 2019-12-19
Payer: MEDICARE

## 2019-12-19 ENCOUNTER — ANESTHESIA (OUTPATIENT)
Dept: SURGERY | Facility: OTHER | Age: 69
End: 2019-12-19
Payer: MEDICARE

## 2019-12-19 VITALS
OXYGEN SATURATION: 94 % | HEART RATE: 49 BPM | SYSTOLIC BLOOD PRESSURE: 114 MMHG | DIASTOLIC BLOOD PRESSURE: 64 MMHG | RESPIRATION RATE: 18 BRPM | BODY MASS INDEX: 28.64 KG/M2 | HEIGHT: 68 IN | WEIGHT: 189 LBS | TEMPERATURE: 98 F

## 2019-12-19 DIAGNOSIS — H25.13 NUCLEAR SCLEROSIS, BILATERAL: ICD-10-CM

## 2019-12-19 PROCEDURE — 66999 UNLISTED PX ANT SEGMENT EYE: CPT | Mod: CSM,,, | Performed by: OPHTHALMOLOGY

## 2019-12-19 PROCEDURE — 71000015 HC POSTOP RECOV 1ST HR: Performed by: OPHTHALMOLOGY

## 2019-12-19 PROCEDURE — 66984 XCAPSL CTRC RMVL W/O ECP: CPT | Mod: RT,,, | Performed by: OPHTHALMOLOGY

## 2019-12-19 PROCEDURE — 25000003 PHARM REV CODE 250: Performed by: OPHTHALMOLOGY

## 2019-12-19 PROCEDURE — 36000706: Performed by: OPHTHALMOLOGY

## 2019-12-19 PROCEDURE — 36000707: Performed by: OPHTHALMOLOGY

## 2019-12-19 PROCEDURE — 37000008 HC ANESTHESIA 1ST 15 MINUTES: Performed by: OPHTHALMOLOGY

## 2019-12-19 PROCEDURE — 37000009 HC ANESTHESIA EA ADD 15 MINS: Performed by: OPHTHALMOLOGY

## 2019-12-19 PROCEDURE — 66984 PR REMOVAL, CATARACT, W/INSRT INTRAOC LENS, W/O ENDO CYCLO: ICD-10-PCS | Mod: RT,,, | Performed by: OPHTHALMOLOGY

## 2019-12-19 PROCEDURE — V2788 PRESBYOPIA-CORRECT FUNCTION: HCPCS | Performed by: OPHTHALMOLOGY

## 2019-12-19 PROCEDURE — 63600175 PHARM REV CODE 636 W HCPCS: Performed by: NURSE ANESTHETIST, CERTIFIED REGISTERED

## 2019-12-19 PROCEDURE — 66999 PR FEMTO MFIOL: ICD-10-PCS | Mod: CSM,,, | Performed by: OPHTHALMOLOGY

## 2019-12-19 RX ORDER — TROPICAMIDE 10 MG/ML
1 SOLUTION/ DROPS OPHTHALMIC
Status: COMPLETED | OUTPATIENT
Start: 2019-12-19 | End: 2019-12-19

## 2019-12-19 RX ORDER — MOXIFLOXACIN 5 MG/ML
1 SOLUTION/ DROPS OPHTHALMIC
Status: COMPLETED | OUTPATIENT
Start: 2019-12-19 | End: 2019-12-19

## 2019-12-19 RX ORDER — PHENYLEPHRINE HYDROCHLORIDE 25 MG/ML
1 SOLUTION/ DROPS OPHTHALMIC
Status: COMPLETED | OUTPATIENT
Start: 2019-12-19 | End: 2019-12-19

## 2019-12-19 RX ORDER — TETRACAINE HYDROCHLORIDE 5 MG/ML
SOLUTION OPHTHALMIC
Status: DISCONTINUED | OUTPATIENT
Start: 2019-12-19 | End: 2019-12-19 | Stop reason: HOSPADM

## 2019-12-19 RX ORDER — LIDOCAINE HYDROCHLORIDE 40 MG/ML
INJECTION, SOLUTION RETROBULBAR
Status: DISCONTINUED | OUTPATIENT
Start: 2019-12-19 | End: 2019-12-19 | Stop reason: HOSPADM

## 2019-12-19 RX ORDER — PHENYLEPHRINE HYDROCHLORIDE 100 MG/ML
1 SOLUTION/ DROPS OPHTHALMIC ONCE
Status: COMPLETED | OUTPATIENT
Start: 2019-12-19 | End: 2019-12-19

## 2019-12-19 RX ORDER — PROPARACAINE HYDROCHLORIDE 5 MG/ML
1 SOLUTION/ DROPS OPHTHALMIC
Status: DISCONTINUED | OUTPATIENT
Start: 2019-12-19 | End: 2019-12-19 | Stop reason: HOSPADM

## 2019-12-19 RX ORDER — ACETAMINOPHEN 325 MG/1
650 TABLET ORAL EVERY 4 HOURS PRN
Status: DISCONTINUED | OUTPATIENT
Start: 2019-12-19 | End: 2019-12-19 | Stop reason: HOSPADM

## 2019-12-19 RX ORDER — MOXIFLOXACIN 5 MG/ML
SOLUTION/ DROPS OPHTHALMIC
Status: DISCONTINUED | OUTPATIENT
Start: 2019-12-19 | End: 2019-12-19 | Stop reason: HOSPADM

## 2019-12-19 RX ORDER — MIDAZOLAM HYDROCHLORIDE 1 MG/ML
INJECTION INTRAMUSCULAR; INTRAVENOUS
Status: DISCONTINUED | OUTPATIENT
Start: 2019-12-19 | End: 2019-12-19

## 2019-12-19 RX ORDER — TETRACAINE HYDROCHLORIDE 5 MG/ML
1 SOLUTION OPHTHALMIC
Status: COMPLETED | OUTPATIENT
Start: 2019-12-19 | End: 2019-12-19

## 2019-12-19 RX ADMIN — PHENYLEPHRINE HYDROCHLORIDE 1 DROP: 25 SOLUTION/ DROPS OPHTHALMIC at 12:12

## 2019-12-19 RX ADMIN — TROPICAMIDE 1 DROP: 10 SOLUTION/ DROPS OPHTHALMIC at 12:12

## 2019-12-19 RX ADMIN — TETRACAINE HYDROCHLORIDE 1 DROP: 5 SOLUTION OPHTHALMIC at 12:12

## 2019-12-19 RX ADMIN — MOXIFLOXACIN HYDROCHLORIDE 1 DROP: 5 SOLUTION/ DROPS OPHTHALMIC at 12:12

## 2019-12-19 RX ADMIN — ACETAMINOPHEN 650 MG: 325 TABLET, FILM COATED ORAL at 02:12

## 2019-12-19 RX ADMIN — MOXIFLOXACIN 1 DROP: 5 SOLUTION/ DROPS OPHTHALMIC at 02:12

## 2019-12-19 RX ADMIN — MOXIFLOXACIN 1 DROP: 5 SOLUTION/ DROPS OPHTHALMIC at 01:12

## 2019-12-19 RX ADMIN — MIDAZOLAM HYDROCHLORIDE 2 MG: 1 INJECTION, SOLUTION INTRAMUSCULAR; INTRAVENOUS at 01:12

## 2019-12-19 NOTE — ANESTHESIA PREPROCEDURE EVALUATION
12/19/2019  Viv De La Torre is a 69 y.o., female.    Anesthesia Evaluation    I have reviewed the Patient Summary Reports.    I have reviewed the Nursing Notes.   I have reviewed the Medications.     Review of Systems  Anesthesia Hx:  No problems with previous Anesthesia  Denies Family Hx of Anesthesia complications.   Denies Personal Hx of Anesthesia complications.   Social:  Non-Smoker    Hematology/Oncology:  Hematology Normal   Oncology Normal     EENT/Dental:EENT/Dental Normal   Cardiovascular:  Cardiovascular Normal Exercise tolerance: good     Pulmonary:  Pulmonary Normal    Renal/:  Renal/ Normal     Musculoskeletal:   Arthritis     Neurological:   Neuromuscular Disease,    Endocrine:  Endocrine Normal    Dermatological:  Skin Normal    Psych:   anxiety          Physical Exam  General:  Well nourished    Airway/Jaw/Neck:  Airway Findings: Mouth Opening: Normal Tongue: Normal  General Airway Assessment: Adult  Mallampati: I  TM Distance: Normal, at least 6 cm  Jaw/Neck Findings:  Neck ROM: Normal ROM      Dental:  Dental Findings: In tact             Anesthesia Plan  Type of Anesthesia, risks & benefits discussed:  Anesthesia Type:  MAC  Patient's Preference:   Intra-op Monitoring Plan:   Intra-op Monitoring Plan Comments:   Post Op Pain Control Plan:   Post Op Pain Control Plan Comments:   Induction:    Beta Blocker:         Informed Consent: Patient understands risks and agrees with Anesthesia plan.  Questions answered. Anesthesia consent signed with patient.  ASA Score: 3     Day of Surgery Review of History & Physical:    H&P update referred to the surgeon.         Ready For Surgery From Anesthesia Perspective.

## 2019-12-19 NOTE — DISCHARGE INSTRUCTIONS
Aleyda Acosta MD  [facility]  Department of Ophthalmology      AFTER: Cataract Surgery:   Relax at home and DO NOT exert yourself for the rest of the day.  Plan to see Dr. Acosta tomorrow at the eye clinic:   [address]     Refer to the attached eye drop instruction sheet    Precautions:  DO NOT rub your eye.  You may resume moderate activity the day after surgery.  Wear protective sunglasses during the day and a shield at night for 1(one) week.  If you have pain, redness and decreased vision, call Dr. Acosta (or the on-call doctor after hours) @[phone].                    Home Care Instructions for Eye Surgeries    1. ACTIVITY:  Limit your activity today. Relax at home and DO NOT exert yourself for the rest of the day. No bending forward at the waist for one week. Increase activity gradually. You may return to work or school as directed by your physician.    2. DIET:  Drink plenty of fluids. Resume your normal diet unless instructed otherwise.    3. PAIN:  Expect a moderate amount of pain. If a prescription for pain is not sent home with you, you may take your commonly used pain reliever as directed. If this is not sufficient, call your physician. You may resume any other prescription medication unless otherwise directed by your physician.     Discuss any problem with your physician as soon as it arises. Do not Delay.    EMERGENCY- If you are unable to contact your physician, please go to the nearest Emergency Room.       Anesthesia: Monitored Anesthesia Care (MAC)    Anesthesia Safety  · Have an adult family member or friend drive you home after the procedure.  · For the first 24 hours after your surgery:  · Do not drive or use heavy equipment.  · Do not make important decisions or sign documents.  · Avoid alcohol.  Have someone stay with you, if possible. They can watch for problems and help keep you safe.

## 2019-12-19 NOTE — OP NOTE
SURGEON:  Aleyda Acosta M.D.    PREOPERATIVE DIAGNOSIS:    Nuclear Sclerotic Cataract Right Eye    POSTOPERATIVE DIAGNOSIS:    Nuclear Sclerotic Cataract Right Eye    PROCEDURES:    Phacoemulsification with  intraocular lens, Right eye (29599)  With Femtosecond LASER assist    DATE OF SURGERY: 12/19/2019    IMPLANT: FNT00 22.5    ANESTHESIA:  MAC with topical Lidocaine    COMPLICATIONS:  None    ESTIMATED BLOOD LOSS: None    SPECIMENS: None    INDICATIONS:    The patient has a history of painless progressive visual loss and  difficulty with activities of daily living secondary to cataract formation.  After a thorough discussion of the risks, benefits, and alternatives to cataract surgery, including, but not limited to, the rare risks of infection, retinal detachment, hemorrhage, need for additional surgery, loss of vision, and even loss of the eye, the patient voices understanding and desires to proceed.    DESCRIPTION OF PROCEDURE:    After verification of consent and marking of the operative eye, the patient was positioned under the femtosecond LASER. Topical anesthetic drops were administered. A surgical timeout was initiated with verification of patient identifiers and the laser surgical plan. The eye was docked securely and the laser portion of the cataract procedure was carried out without complication.  The patient was returned to the pre-operative area to await the intraocular surgical portion of the cataract procedure.  The patients IOL calculations were reviewed, and the lens selection confirmed.   After verification and marking of the proper eye in the preop holding area, the patient was brought to the operating room in supine position where the eye was prepped and draped in standard sterile fashion with 5% Betadine and a lid speculum placed in the eye.   Topical 4% Lidocaine was used in addition to the preoperative anesthesia and the procedure was begun by the creation of a paracentesis incision through  which viscoelastic was used to fill the anterior chamber.  Next, a keratome blade was used to create a triplanar temporal clear corneal incision and a cystotome and Utrata forceps used to fashion a continuous curvilinear capsulorrhexis.  Hydrodissection was carried out using the Barba hydrodissection cannula and the nucleus was found to be mobile.  Phacoemulsification of the nucleus was carried out using a quick chop technique, and all remaining epinuclear and cortical material was removed.  The eye was then reformed with Viscoelastic and the  intraocular lens was implanted into the capsular bag.  All remaining viscoelastics were removed from the eye and at the end of the case the pupil was round, the lens was well-centered within the capsular bag and all wounds were found to be water tight.  Drops of Vigamox and Pred Forte were instilled and a shield was placed over the eye. The patient will follow up with Dr. Acosta in the morning.

## 2019-12-19 NOTE — ANESTHESIA POSTPROCEDURE EVALUATION
Anesthesia Post Evaluation    Patient: Viv De La Torre    Procedure(s) Performed: Procedure(s) (LRB):  EXTRACTION, CATARACT, WITH IOL INSERTION (Right)    Final Anesthesia Type: MAC    Patient location during evaluation: [facility]  Patient participation: Yes- Able to Participate  Level of consciousness: awake and alert and oriented  Post-procedure vital signs: reviewed and stable  Pain management: adequate  Airway patency: patent    PONV status at discharge: No PONV  Anesthetic complications: no      Cardiovascular status: hemodynamically stable  Respiratory status: room air, spontaneous ventilation and unassisted  Hydration status: euvolemic  Follow-up not needed.          Vitals Value Taken Time   /63 12/19/2019 12:23 PM   Temp 36.5 °C (97.7 °F) 12/19/2019 12:23 PM   Pulse 48 12/19/2019 12:23 PM   Resp 18 12/19/2019 12:23 PM   SpO2 97 % 12/19/2019 12:23 PM         No case tracking events are documented in the log.      Pain/Jimenez Score: No data recorded

## 2019-12-19 NOTE — DISCHARGE SUMMARY
Outcome: Successful outpatient ophthalmic surgical procedure  Preprinted Instructions given to patient.  Regular diet.  Activity: No restrictions  Meds: see Med Rec  Condition: stable  Follow up: 1 day with Dr Acosta  Disposition: Home  Diagnosis: s/p eye surgery

## 2019-12-20 ENCOUNTER — OFFICE VISIT (OUTPATIENT)
Dept: OPHTHALMOLOGY | Facility: CLINIC | Age: 69
End: 2019-12-20
Payer: MEDICARE

## 2019-12-20 DIAGNOSIS — Z98.890 POST-OPERATIVE STATE: Primary | ICD-10-CM

## 2019-12-20 DIAGNOSIS — H25.13 NUCLEAR SCLEROSIS, BILATERAL: ICD-10-CM

## 2019-12-20 PROCEDURE — 99024 PR POST-OP FOLLOW-UP VISIT: ICD-10-PCS | Mod: POP,,, | Performed by: OPHTHALMOLOGY

## 2019-12-20 PROCEDURE — 99212 OFFICE O/P EST SF 10 MIN: CPT | Mod: PBBFAC | Performed by: OPHTHALMOLOGY

## 2019-12-20 PROCEDURE — 99999 PR PBB SHADOW E&M-EST. PATIENT-LVL II: ICD-10-PCS | Mod: PBBFAC,,, | Performed by: OPHTHALMOLOGY

## 2019-12-20 PROCEDURE — 99024 POSTOP FOLLOW-UP VISIT: CPT | Mod: POP,,, | Performed by: OPHTHALMOLOGY

## 2019-12-20 PROCEDURE — 99999 PR PBB SHADOW E&M-EST. PATIENT-LVL II: CPT | Mod: PBBFAC,,, | Performed by: OPHTHALMOLOGY

## 2019-12-20 NOTE — PROGRESS NOTES
HPI     Post-op Evaluation      Additional comments: 1 day po              Comments     Patient states the right eye is feeling well today. No pain. No   discharge. Her va seems to be a little blurry in OD today.    S/p Phacoemulsification with  intraocular lens, Right eye With Femtosecond   LASER assist- 12/19/2019          Last edited by Miller Kruger on 12/20/2019 11:15 AM. (History)            Assessment /Plan     For exam results, see Encounter Report.    Post-operative state    Nuclear sclerosis, bilateral      Slit lamp exam:  L/L: nl  K: clear, wound sealed  AC: 1+ cell  Lens: IOL centered and stable    POD1 s/p Phaco/IOL  Appropriate precautions and post op medications reviewed.  Patient instructed to call or come in if symptoms of redness, decreased vision, or pain are experienced.

## 2020-01-06 ENCOUNTER — TELEPHONE (OUTPATIENT)
Dept: OPHTHALMOLOGY | Facility: CLINIC | Age: 70
End: 2020-01-06

## 2020-01-06 RX ORDER — PREDNISOLONE ACETATE 10 MG/ML
1 SUSPENSION/ DROPS OPHTHALMIC 3 TIMES DAILY
Qty: 1 BOTTLE | Refills: 1 | Status: ON HOLD | OUTPATIENT
Start: 2020-01-06 | End: 2020-01-16 | Stop reason: CLARIF

## 2020-01-06 NOTE — TELEPHONE ENCOUNTER
----- Message from Isabel Wooderik sent at 1/6/2020  1:35 PM CST -----  Contact: self @ [phone]  Pt is req a refill for prednisolon/gatiflox/bromfenac (PREDNISOL ACE-GATIFLOX-BROMFEN) 1-0.5-0.075 % DrpS.  Pt would like to speak with someone to see if she can get this medication locally instead of from [place].  Pt says she has been out of medication and she does not feel well when she is not using the drops.  Pls call.

## 2020-01-06 NOTE — TELEPHONE ENCOUNTER
Spoke to patient no local pharmacy can make the drops will send in Pred until she receives her new order.

## 2020-01-08 ENCOUNTER — OFFICE VISIT (OUTPATIENT)
Dept: OPHTHALMOLOGY | Facility: CLINIC | Age: 70
End: 2020-01-08
Payer: MEDICARE

## 2020-01-08 DIAGNOSIS — Z98.890 POST-OPERATIVE STATE: Primary | ICD-10-CM

## 2020-01-08 DIAGNOSIS — H25.13 NUCLEAR SCLEROSIS, BILATERAL: ICD-10-CM

## 2020-01-08 PROCEDURE — 99999 PR PBB SHADOW E&M-EST. PATIENT-LVL II: ICD-10-PCS | Mod: PBBFAC,,, | Performed by: OPHTHALMOLOGY

## 2020-01-08 PROCEDURE — 99024 POSTOP FOLLOW-UP VISIT: CPT | Mod: POP,,, | Performed by: OPHTHALMOLOGY

## 2020-01-08 PROCEDURE — 92136 IOL MASTER - MOD 26 - OS - LEFT EYE: ICD-10-PCS | Mod: 26,S$PBB,LT, | Performed by: OPHTHALMOLOGY

## 2020-01-08 PROCEDURE — 99999 PR PBB SHADOW E&M-EST. PATIENT-LVL II: CPT | Mod: PBBFAC,,, | Performed by: OPHTHALMOLOGY

## 2020-01-08 PROCEDURE — 92136 OPHTHALMIC BIOMETRY: CPT | Mod: PBBFAC,LT | Performed by: OPHTHALMOLOGY

## 2020-01-08 PROCEDURE — 99212 OFFICE O/P EST SF 10 MIN: CPT | Mod: PBBFAC | Performed by: OPHTHALMOLOGY

## 2020-01-08 PROCEDURE — 99024 PR POST-OP FOLLOW-UP VISIT: ICD-10-PCS | Mod: POP,,, | Performed by: OPHTHALMOLOGY

## 2020-01-08 RX ORDER — TROPICAMIDE 10 MG/ML
1 SOLUTION/ DROPS OPHTHALMIC
Status: CANCELLED | OUTPATIENT
Start: 2020-01-08

## 2020-01-08 RX ORDER — MOXIFLOXACIN 5 MG/ML
1 SOLUTION/ DROPS OPHTHALMIC
Status: CANCELLED | OUTPATIENT
Start: 2020-01-08

## 2020-01-08 RX ORDER — PHENYLEPHRINE HYDROCHLORIDE 25 MG/ML
1 SOLUTION/ DROPS OPHTHALMIC
Status: CANCELLED | OUTPATIENT
Start: 2020-01-08

## 2020-01-08 RX ORDER — TETRACAINE HYDROCHLORIDE 5 MG/ML
1 SOLUTION OPHTHALMIC
Status: CANCELLED | OUTPATIENT
Start: 2020-01-08

## 2020-01-08 NOTE — PROGRESS NOTES
HPI     S/p Phaco/IOL OD (12/19/19).    Pt states that she is still seeing a black crescent moon temp on the side   OD. Pt states that this crescent moon does come and go. Pt states that        Last edited by Maia Parnell on 1/8/2020  3:45 PM. (History)            Assessment /Plan     For exam results, see Encounter Report.    Post-operative state    Nuclear sclerosis, bilateral      Slit lamp exam:  L/L: nl  K: clear, wound sealed  AC: trace cell  Iris/Lens: IOL centered and stable    POW1 s/p phaco: Surgery healing well with no signs of infection or abnormal inflammation.    Patient wishes to proceed with surgery in the second eye. Risks, benefits, alternatives reviewed. IOL selection reviewed.     Left eye  IOL: TFNT0 (Panoptix) 22.5      The patient expresses a desire to reduce spectacle dependence. I reviewed various IOL and LASER refractive surgical options and we will attempt to minimize spectacle dependence by managing astigmatism and optimizing IOL selection. Femtosecond LASER assisted cataract surgery (FLACS) technology was explained to the patient with educational videos and discussion.  The patient voices understanding and wishes to implement this technology during the cataract procedure.  I explained the increased precision of the LASER versus manual techniques, especially as it relates to astigmatism reduction with arcuate incisions.  I emphasized that although our goal is to reduce the need for refractive correction after surgery, there may still be a need for spectacle correction to achieve optimal visual acuity, and that a reasonable range of functional vision should be the expectation.  No guarantees are made about post operative refraction or visual acuity, as the eye may heal in unpredictable ways, and the standard risks, benefits, and alternatives to cataract surgery were explained.  The patient understands that the refractive portions of this cataract procedure are not covered by insurance,  and that there is an out of pocket expense of $2250 per eye. I also explained that even though our pre-operative plan is to utilize advanced refractive technologies during surgery, that I may decide to eliminate part or all of this plan if surgical challenges or complications arise, or I feel that it is not in the patient's best interest. Consent forms and an ABN form were given to the patient to review.    Catalys Parameters:    Left Eye:   HUMBERTO:  12mm   ?Need to aida patient sitting up?: n  Capsulotomy: Scanned Capsule  stGstrstastdstest:st st1st Arcuate:  OFF  Incisions:  OFF

## 2020-01-14 ENCOUNTER — TELEPHONE (OUTPATIENT)
Dept: OPHTHALMOLOGY | Facility: CLINIC | Age: 70
End: 2020-01-14

## 2020-01-14 NOTE — TELEPHONE ENCOUNTER
Spoke with patient confirming sx date for 01/16/2020. Arrival time for 9am. No food after 9pm the night before sx. But, they may have water, gatorade, or powerade after 9pm until leaving home the morning of sx. Start eye drops on Tuesday, one drop TID into operative eye. TR 1/14/2020

## 2020-01-16 ENCOUNTER — ANESTHESIA (OUTPATIENT)
Dept: SURGERY | Facility: OTHER | Age: 70
End: 2020-01-16
Payer: MEDICARE

## 2020-01-16 ENCOUNTER — ANESTHESIA EVENT (OUTPATIENT)
Dept: SURGERY | Facility: OTHER | Age: 70
End: 2020-01-16
Payer: MEDICARE

## 2020-01-16 ENCOUNTER — HOSPITAL ENCOUNTER (OUTPATIENT)
Facility: OTHER | Age: 70
Discharge: HOME | End: 2020-01-16
Attending: OPHTHALMOLOGY | Admitting: OPHTHALMOLOGY
Payer: MEDICARE

## 2020-01-16 VITALS
HEART RATE: 55 BPM | OXYGEN SATURATION: 96 % | TEMPERATURE: 97 F | WEIGHT: 189 LBS | RESPIRATION RATE: 18 BRPM | BODY MASS INDEX: 28.64 KG/M2 | DIASTOLIC BLOOD PRESSURE: 75 MMHG | HEIGHT: 68 IN | SYSTOLIC BLOOD PRESSURE: 134 MMHG

## 2020-01-16 DIAGNOSIS — H25.12 NUCLEAR SCLEROTIC CATARACT OF LEFT EYE: Primary | ICD-10-CM

## 2020-01-16 DIAGNOSIS — Z98.890 POST-OPERATIVE STATE: ICD-10-CM

## 2020-01-16 DIAGNOSIS — H25.13 NUCLEAR SCLEROSIS, BILATERAL: ICD-10-CM

## 2020-01-16 PROCEDURE — 25000003 PHARM REV CODE 250: Performed by: OPHTHALMOLOGY

## 2020-01-16 PROCEDURE — 66984 XCAPSL CTRC RMVL W/O ECP: CPT | Mod: 79,LT,, | Performed by: OPHTHALMOLOGY

## 2020-01-16 PROCEDURE — 66999 PR FEMTO MFIOL: ICD-10-PCS | Mod: CSM,LT,, | Performed by: OPHTHALMOLOGY

## 2020-01-16 PROCEDURE — 63600175 PHARM REV CODE 636 W HCPCS: Performed by: NURSE ANESTHETIST, CERTIFIED REGISTERED

## 2020-01-16 PROCEDURE — V2788 PRESBYOPIA-CORRECT FUNCTION: HCPCS | Performed by: OPHTHALMOLOGY

## 2020-01-16 PROCEDURE — 66999 UNLISTED PX ANT SEGMENT EYE: CPT | Mod: CSM,LT,, | Performed by: OPHTHALMOLOGY

## 2020-01-16 PROCEDURE — 36000707: Performed by: OPHTHALMOLOGY

## 2020-01-16 PROCEDURE — 66984 PR REMOVAL, CATARACT, W/INSRT INTRAOC LENS, W/O ENDO CYCLO: ICD-10-PCS | Mod: 79,LT,, | Performed by: OPHTHALMOLOGY

## 2020-01-16 PROCEDURE — 71000015 HC POSTOP RECOV 1ST HR: Performed by: OPHTHALMOLOGY

## 2020-01-16 PROCEDURE — 37000009 HC ANESTHESIA EA ADD 15 MINS: Performed by: OPHTHALMOLOGY

## 2020-01-16 PROCEDURE — 37000008 HC ANESTHESIA 1ST 15 MINUTES: Performed by: OPHTHALMOLOGY

## 2020-01-16 PROCEDURE — 36000706: Performed by: OPHTHALMOLOGY

## 2020-01-16 DEVICE — IMPLANTABLE DEVICE: Type: IMPLANTABLE DEVICE | Site: EYE | Status: FUNCTIONAL

## 2020-01-16 RX ORDER — PHENYLEPHRINE HYDROCHLORIDE 25 MG/ML
1 SOLUTION/ DROPS OPHTHALMIC
Status: COMPLETED | OUTPATIENT
Start: 2020-01-16 | End: 2020-01-16

## 2020-01-16 RX ORDER — MOXIFLOXACIN 5 MG/ML
1 SOLUTION/ DROPS OPHTHALMIC
Status: COMPLETED | OUTPATIENT
Start: 2020-01-16 | End: 2020-01-16

## 2020-01-16 RX ORDER — ACETAMINOPHEN 325 MG/1
650 TABLET ORAL EVERY 4 HOURS PRN
Status: DISCONTINUED | OUTPATIENT
Start: 2020-01-16 | End: 2020-01-16 | Stop reason: HOSPADM

## 2020-01-16 RX ORDER — MOXIFLOXACIN 5 MG/ML
SOLUTION/ DROPS OPHTHALMIC
Status: DISCONTINUED | OUTPATIENT
Start: 2020-01-16 | End: 2020-01-16 | Stop reason: HOSPADM

## 2020-01-16 RX ORDER — TETRACAINE HYDROCHLORIDE 5 MG/ML
1 SOLUTION OPHTHALMIC
Status: COMPLETED | OUTPATIENT
Start: 2020-01-16 | End: 2020-01-16

## 2020-01-16 RX ORDER — LIDOCAINE HYDROCHLORIDE 40 MG/ML
INJECTION, SOLUTION RETROBULBAR
Status: DISCONTINUED | OUTPATIENT
Start: 2020-01-16 | End: 2020-01-16 | Stop reason: HOSPADM

## 2020-01-16 RX ORDER — MIDAZOLAM HYDROCHLORIDE 1 MG/ML
INJECTION INTRAMUSCULAR; INTRAVENOUS
Status: DISCONTINUED | OUTPATIENT
Start: 2020-01-16 | End: 2020-01-16

## 2020-01-16 RX ORDER — MOXIFLOXACIN 5 MG/ML
1 SOLUTION/ DROPS OPHTHALMIC
Status: DISCONTINUED | OUTPATIENT
Start: 2020-01-16 | End: 2020-01-16 | Stop reason: HOSPADM

## 2020-01-16 RX ORDER — TROPICAMIDE 10 MG/ML
1 SOLUTION/ DROPS OPHTHALMIC
Status: COMPLETED | OUTPATIENT
Start: 2020-01-16 | End: 2020-01-16

## 2020-01-16 RX ORDER — PHENYLEPHRINE HYDROCHLORIDE 100 MG/ML
1 SOLUTION/ DROPS OPHTHALMIC ONCE
Status: COMPLETED | OUTPATIENT
Start: 2020-01-16 | End: 2020-01-16

## 2020-01-16 RX ORDER — TETRACAINE HYDROCHLORIDE 5 MG/ML
SOLUTION OPHTHALMIC
Status: DISCONTINUED | OUTPATIENT
Start: 2020-01-16 | End: 2020-01-16 | Stop reason: HOSPADM

## 2020-01-16 RX ORDER — PROPARACAINE HYDROCHLORIDE 5 MG/ML
1 SOLUTION/ DROPS OPHTHALMIC
Status: DISCONTINUED | OUTPATIENT
Start: 2020-01-16 | End: 2020-01-16 | Stop reason: HOSPADM

## 2020-01-16 RX ADMIN — TETRACAINE HYDROCHLORIDE 1 DROP: 5 SOLUTION OPHTHALMIC at 10:01

## 2020-01-16 RX ADMIN — PHENYLEPHRINE HYDROCHLORIDE 1 DROP: 25 SOLUTION/ DROPS OPHTHALMIC at 10:01

## 2020-01-16 RX ADMIN — MIDAZOLAM HYDROCHLORIDE 2 MG: 1 INJECTION, SOLUTION INTRAMUSCULAR; INTRAVENOUS at 11:01

## 2020-01-16 RX ADMIN — TROPICAMIDE 1 DROP: 10 SOLUTION/ DROPS OPHTHALMIC at 10:01

## 2020-01-16 RX ADMIN — MOXIFLOXACIN HYDROCHLORIDE 1 DROP: 5 SOLUTION/ DROPS OPHTHALMIC at 10:01

## 2020-01-16 RX ADMIN — PROPARACAINE HYDROCHLORIDE 1 DROP: 5 SOLUTION/ DROPS OPHTHALMIC at 12:01

## 2020-01-16 RX ADMIN — MOXIFLOXACIN 1 DROP: 5 SOLUTION/ DROPS OPHTHALMIC at 12:01

## 2020-01-16 NOTE — ANESTHESIA POSTPROCEDURE EVALUATION
Anesthesia Post Evaluation    Patient: Viv De La Torre    Procedure(s) Performed: Procedure(s) (LRB):  EXTRACTION, CATARACT, WITH IOL INSERTION (Left)    Final Anesthesia Type: MAC    Patient location during evaluation: [facility]  Patient participation: Yes- Able to Participate  Level of consciousness: awake and alert  Post-procedure vital signs: reviewed and stable  Pain management: adequate  Airway patency: patent    PONV status at discharge: No PONV  Anesthetic complications: no      Cardiovascular status: blood pressure returned to baseline and hemodynamically stable  Respiratory status: spontaneous ventilation and room air  Hydration status: euvolemic  Follow-up not needed.          Vitals Value Taken Time   /75 1/16/2020 10:22 AM   Temp 36.1 °C (97 °F) 1/16/2020 10:22 AM   Pulse 45 1/16/2020 10:22 AM   Resp 18 1/16/2020 10:22 AM   SpO2 96 % 1/16/2020 10:22 AM         No case tracking events are documented in the log.      Pain/Jimenez Score: No data recorded

## 2020-01-16 NOTE — PLAN OF CARE
Viv De La Torre has met all discharge criteria from Phase II. Vital Signs are stable, ambulating  without difficulty. Discharge instructions given, patient verbalized understanding. Discharged from facility via wheelchair in stable condition.

## 2020-01-16 NOTE — DISCHARGE INSTRUCTIONS
Aleyda Acosta MD  [facility]  Department of Ophthalmology      AFTER: Cataract Surgery:   Relax at home and DO NOT exert yourself for the rest of the day.  Plan to see Dr. Acosta tomorrow at the eye clinic:   [address]     Refer to the attached eye drop instruction sheet    Precautions:  DO NOT rub your eye.  You may resume moderate activity the day after surgery.  Wear protective sunglasses during the day and a shield at night for 1(one) week.  If you have pain, redness and decreased vision, call Dr. Acosta (or the on-call doctor after hours) @[phone].                    Home Care Instructions for Eye Surgeries    1. ACTIVITY:  Limit your activity today. Relax at home and DO NOT exert yourself for the rest of the day. No bending forward at the waist for one week. Increase activity gradually. You may return to work or school as directed by your physician.    2. DIET:  Drink plenty of fluids. Resume your normal diet unless instructed otherwise.    3. PAIN:  Expect a moderate amount of pain. If a prescription for pain is not sent home with you, you may take your commonly used pain reliever as directed. If this is not sufficient, call your physician. You may resume any other prescription medication unless otherwise directed by your physician.     Discuss any problem with your physician as soon as it arises. Do not Delay.    EMERGENCY- If you are unable to contact your physician, please go to the nearest Emergency Room.       Anesthesia: Monitored Anesthesia Care (MAC)    Anesthesia Safety  · Have an adult family member or friend drive you home after the procedure.  · For the first 24 hours after your surgery:  · Do not drive or use heavy equipment.  · Do not make important decisions or sign documents.  · Avoid alcohol.  Have someone stay with you, if possible. They can watch for problems and help keep you safe.

## 2020-01-16 NOTE — ANESTHESIA PREPROCEDURE EVALUATION
01/16/2020  Viv De La Torre is a 70 y.o., female.    Anesthesia Evaluation    I have reviewed the Patient Summary Reports.    I have reviewed the Nursing Notes.   I have reviewed the Medications.     Review of Systems  Anesthesia Hx:  No problems with previous Anesthesia    Social:  Social Alcohol Use, Former Smoker    Hematology/Oncology:  Hematology Normal   Oncology Normal     EENT/Dental:   chronic allergic rhinitis   Cardiovascular:  Cardiovascular Normal Exercise tolerance: good  SVT   Pulmonary:  Pulmonary Normal    Renal/:  Renal/ Normal     Hepatic/GI:  Hepatic/GI Normal    Musculoskeletal:   Arthritis     Neurological:   Neuromuscular Disease,    Endocrine:   Hypothyroidism    Dermatological:   Alopecia.   Psych:   Psychiatric History anxiety depression          Physical Exam  General:  Well nourished    Airway/Jaw/Neck:  Airway Findings: Mouth Opening: Normal Tongue: Normal  General Airway Assessment: Adult, Good  Mallampati: I  TM Distance: Normal, at least 6 cm  Jaw/Neck Findings:  Neck ROM: Normal ROM      Dental:  Dental Findings: In tact        Mental Status:  Mental Status Findings:  Cooperative, Alert and Oriented         Anesthesia Plan  Type of Anesthesia, risks & benefits discussed:  Anesthesia Type:  MAC  Patient's Preference:   Intra-op Monitoring Plan: standard ASA monitors  Intra-op Monitoring Plan Comments:   Post Op Pain Control Plan: per primary service following discharge from PACU  Post Op Pain Control Plan Comments:   Induction:    Beta Blocker:         Informed Consent: Patient understands risks and agrees with Anesthesia plan.  Questions answered. Anesthesia consent signed with patient.  ASA Score: 2     Day of Surgery Review of History & Physical:    H&P update referred to the surgeon.         Ready For Surgery From Anesthesia Perspective.

## 2020-01-17 ENCOUNTER — OFFICE VISIT (OUTPATIENT)
Dept: OPHTHALMOLOGY | Facility: CLINIC | Age: 70
End: 2020-01-17
Payer: MEDICARE

## 2020-01-17 ENCOUNTER — TELEPHONE (OUTPATIENT)
Dept: OPHTHALMOLOGY | Facility: CLINIC | Age: 70
End: 2020-01-17

## 2020-01-17 DIAGNOSIS — H25.12 NUCLEAR SCLEROTIC CATARACT OF LEFT EYE: ICD-10-CM

## 2020-01-17 DIAGNOSIS — Z98.890 POST-OPERATIVE STATE: Primary | ICD-10-CM

## 2020-01-17 PROCEDURE — 99999 PR PBB SHADOW E&M-EST. PATIENT-LVL III: ICD-10-PCS | Mod: PBBFAC,,, | Performed by: OPHTHALMOLOGY

## 2020-01-17 PROCEDURE — 99024 PR POST-OP FOLLOW-UP VISIT: ICD-10-PCS | Mod: POP,,, | Performed by: OPHTHALMOLOGY

## 2020-01-17 PROCEDURE — 99213 OFFICE O/P EST LOW 20 MIN: CPT | Mod: PBBFAC | Performed by: OPHTHALMOLOGY

## 2020-01-17 PROCEDURE — 99024 POSTOP FOLLOW-UP VISIT: CPT | Mod: POP,,, | Performed by: OPHTHALMOLOGY

## 2020-01-17 PROCEDURE — 99999 PR PBB SHADOW E&M-EST. PATIENT-LVL III: CPT | Mod: PBBFAC,,, | Performed by: OPHTHALMOLOGY

## 2020-01-17 NOTE — PROGRESS NOTES
HPI     POD1 CE w/IOL OS     Patient states she is doing well, notices a black halo in periphery.     PGB OS TID     Last edited by Kerrie Garcia, PCT on 1/17/2020  9:21 AM. (History)            Assessment /Plan     For exam results, see Encounter Report.    Post-operative state    Nuclear sclerotic cataract of left eye      Slit lamp exam:  L/L: nl  K: clear, wound sealed  AC: 1+ cell  Lens: IOL centered and stable    POD1 s/p Phaco/IOL  Appropriate precautions and post op medications reviewed.  Patient instructed to call or come in if symptoms of redness, decreased vision, or pain are experienced.

## 2020-01-17 NOTE — TELEPHONE ENCOUNTER
----- Message from Kelby Coyle sent at 1/17/2020  1:08 PM CST -----  Contact: pt @ [phone]  Pt states she's returning a call to the clinic

## 2020-01-22 NOTE — OP NOTE
SURGEON:  Aleyda Acosta M.D.    PREOPERATIVE DIAGNOSIS:    Nuclear Sclerotic Cataract Left Eye    POSTOPERATIVE DIAGNOSIS:    Nuclear Sclerotic Cataract Left Eye    PROCEDURES:    Phacoemulsification with  intraocular lens, Left eye (64628)    DATE OF SURGERY: 1/16/20    IMPLANT: [id number]    ANESTHESIA:  MAC with topical Lidocaine    COMPLICATIONS:  None    ESTIMATED BLOOD LOSS: None    SPECIMENS: None    INDICATIONS:    The patient has a history of painless progressive visual loss and  difficulty with activities of daily living secondary to cataract formation.  After a thorough discussion of the risks, benefits, and alternatives to cataract surgery, including, but not limited to, the rare risks of infection, retinal detachment, hemorrhage, need for additional surgery, loss of vision, and even loss of the eye, the patient voices understanding and desires to proceed.    DESCRIPTION OF PROCEDURE:    The patients IOL calculations were reviewed, and the lens selection confirmed.   After verification and marking of the proper eye in the preop holding area, the patient was brought to the operating room in supine position where the eye was prepped and draped in standard sterile fashion with 5% Betadine and a lid speculum placed in the eye.   Topical 4% Lidocaine was used in addition to the preoperative anesthesia and the procedure was begun by the creation of a paracentesis incision through which viscoelastic was used to fill the anterior chamber.  Next, a keratome blade was used to create a triplanar temporal clear corneal incision and a cystotome and Utrata forceps used to fashion a continuous curvilinear capsulorrhexis.  Hydrodissection was carried out using the Barba hydrodissection cannula and the nucleus was found to be mobile.  Phacoemulsification of the nucleus was carried out using a quick chop technique, and all remaining epinuclear and cortical material was removed.  The eye was then reformed with Viscoelastic and  the  intraocular lens was implanted into the capsular bag.  All remaining viscoelastics were removed from the eye and at the end of the case the pupil was round, the lens was well-centered within the capsular bag and all wounds were found to be water tight.  Drops of Vigamox and Pred Forte were instilled and a shield was placed over the eye. The patient will follow up with Dr. Acosta in the morning.

## 2020-02-01 ENCOUNTER — PATIENT MESSAGE (OUTPATIENT)
Dept: ENDOCRINOLOGY | Facility: CLINIC | Age: 70
End: 2020-02-01

## 2020-02-04 ENCOUNTER — PATIENT MESSAGE (OUTPATIENT)
Dept: ENDOCRINOLOGY | Facility: CLINIC | Age: 70
End: 2020-02-04

## 2020-02-04 ENCOUNTER — OFFICE VISIT (OUTPATIENT)
Dept: OPTOMETRY | Facility: CLINIC | Age: 70
End: 2020-02-04
Payer: MEDICARE

## 2020-02-04 DIAGNOSIS — H02.729 LOSS OF EYELASHES, UNSPECIFIED LATERALITY: ICD-10-CM

## 2020-02-04 DIAGNOSIS — Z96.1 PSEUDOPHAKIA OF BOTH EYES: ICD-10-CM

## 2020-02-04 DIAGNOSIS — H04.123 DRY EYE SYNDROME, BILATERAL: Primary | ICD-10-CM

## 2020-02-04 PROCEDURE — 99999 PR PBB SHADOW E&M-EST. PATIENT-LVL II: ICD-10-PCS | Mod: PBBFAC,,, | Performed by: OPTOMETRIST

## 2020-02-04 PROCEDURE — 99212 OFFICE O/P EST SF 10 MIN: CPT | Mod: PBBFAC | Performed by: OPTOMETRIST

## 2020-02-04 PROCEDURE — 92012 INTRM OPH EXAM EST PATIENT: CPT | Mod: S$PBB,,, | Performed by: OPTOMETRIST

## 2020-02-04 PROCEDURE — 92012 PR EYE EXAM, EST PATIENT,INTERMED: ICD-10-PCS | Mod: S$PBB,,, | Performed by: OPTOMETRIST

## 2020-02-04 PROCEDURE — 99999 PR PBB SHADOW E&M-EST. PATIENT-LVL II: CPT | Mod: PBBFAC,,, | Performed by: OPTOMETRIST

## 2020-02-04 RX ORDER — BIMATOPROST 3 UG/ML
1 SOLUTION TOPICAL NIGHTLY
Qty: 5 ML | Refills: 12 | Status: SHIPPED | OUTPATIENT
Start: 2020-02-04 | End: 2021-12-30 | Stop reason: SDUPTHER

## 2020-02-05 ENCOUNTER — LAB VISIT (OUTPATIENT)
Dept: LAB | Facility: HOSPITAL | Age: 70
End: 2020-02-05
Attending: INTERNAL MEDICINE
Payer: MEDICARE

## 2020-02-05 DIAGNOSIS — E03.4 HYPOTHYROIDISM DUE TO ACQUIRED ATROPHY OF THYROID: ICD-10-CM

## 2020-02-05 PROBLEM — H25.13 NUCLEAR SCLEROSIS, BILATERAL: Status: RESOLVED | Noted: 2019-12-19 | Resolved: 2020-02-05

## 2020-02-05 PROBLEM — H25.12 NUCLEAR SCLEROTIC CATARACT OF LEFT EYE: Status: RESOLVED | Noted: 2020-01-16 | Resolved: 2020-02-05

## 2020-02-05 LAB — TSH SERPL DL<=0.005 MIU/L-ACNC: 3.16 UIU/ML (ref 0.4–4)

## 2020-02-05 PROCEDURE — 36415 COLL VENOUS BLD VENIPUNCTURE: CPT | Mod: PO

## 2020-02-05 PROCEDURE — 84443 ASSAY THYROID STIM HORMONE: CPT

## 2020-02-05 NOTE — PROGRESS NOTES
HPI     Pt recently had cataract sx OU  Pt states at the end of last week she started having some sensitivity and   painful at top of OD. Pt states she is using steroid gtts still OU. Pt   uses otc lubricating gtts prn. Pt not having any blurry va. Having some   sensitivity to light. Not having any pain anymore but pt wants to be sure   everything is okay.      Last edited by Smitha Betancourt on 2/4/2020  2:37 PM. (History)            Assessment /Plan     For exam results, see Encounter Report.    Dry eye syndrome, bilateral   Use PF art tears QID/PRN    Pseudophakia of both eyes   Pt has follow up appt with Dr. Hagen next week    Loss of eyelashes, unspecified laterality  -     bimatoprost (LATISSE) 0.03 % ophthalmic solution; Place 1 application into both eyes every evening. Place one drop on applicator and apply evenly along the skin of the upper eyelid at base of eyelashes once daily at bedtime; repeat procedure for second eye (use a clean applicator).  Dispense: 5 mL; Refill: 12

## 2020-02-14 ENCOUNTER — OFFICE VISIT (OUTPATIENT)
Dept: OPTOMETRY | Facility: CLINIC | Age: 70
End: 2020-02-14
Payer: MEDICARE

## 2020-02-14 DIAGNOSIS — Z98.41 S/P BILATERAL CATARACT EXTRACTION: Primary | ICD-10-CM

## 2020-02-14 DIAGNOSIS — Z98.42 S/P BILATERAL CATARACT EXTRACTION: Primary | ICD-10-CM

## 2020-02-14 PROCEDURE — 99024 PR POST-OP FOLLOW-UP VISIT: ICD-10-PCS | Mod: POP,,, | Performed by: OPTOMETRIST

## 2020-02-14 PROCEDURE — 99999 PR PBB SHADOW E&M-EST. PATIENT-LVL II: CPT | Mod: PBBFAC,,, | Performed by: OPTOMETRIST

## 2020-02-14 PROCEDURE — 99024 POSTOP FOLLOW-UP VISIT: CPT | Mod: POP,,, | Performed by: OPTOMETRIST

## 2020-02-14 PROCEDURE — 99212 OFFICE O/P EST SF 10 MIN: CPT | Mod: PBBFAC | Performed by: OPTOMETRIST

## 2020-02-14 PROCEDURE — 99999 PR PBB SHADOW E&M-EST. PATIENT-LVL II: ICD-10-PCS | Mod: PBBFAC,,, | Performed by: OPTOMETRIST

## 2020-02-14 NOTE — PROGRESS NOTES
HPI     Last eye exam was 2/4/20 with   Pt here for post op.    Pt states that she noticed yesterday that she cannot see things really   close.   Pt states really close is like the distance for doing her eyebrows.  Patient denies diplopia, headaches, flashes/floaters, and pain.  Pt still using drop after surgery, and still using Latisse once every   other day.  12/19/2019 IMPLANT: TFNT00 22.5 OD  1/16/20 IMPLANT: [id number] OS    Last edited by Jaz Hagen, OD on 2/14/2020 10:09 AM. (History)            Assessment /Plan     For exam results, see Encounter Report.    S/P bilateral cataract extraction          1.  Pt doing well.  No rx given.  No CME OU.  Retina flat and intact OU--no holes, tears, breaks, or RDs.  Patient educated pt stop drops in the next few days.  RTC 1 year for routine exam.

## 2020-03-02 ENCOUNTER — OFFICE VISIT (OUTPATIENT)
Dept: DERMATOLOGY | Facility: CLINIC | Age: 70
End: 2020-03-02
Payer: MEDICARE

## 2020-03-02 VITALS — HEIGHT: 68 IN | WEIGHT: 188.94 LBS | BODY MASS INDEX: 28.63 KG/M2 | RESPIRATION RATE: 18 BRPM

## 2020-03-02 DIAGNOSIS — L72.0 MILIA: ICD-10-CM

## 2020-03-02 DIAGNOSIS — L82.1 SEBORRHEIC KERATOSES: ICD-10-CM

## 2020-03-02 DIAGNOSIS — L91.8 CUTANEOUS SKIN TAGS: ICD-10-CM

## 2020-03-02 DIAGNOSIS — D18.01 CHERRY ANGIOMA: Primary | ICD-10-CM

## 2020-03-02 PROCEDURE — 99999 PR PBB SHADOW E&M-EST. PATIENT-LVL II: CPT | Mod: PBBFAC,,, | Performed by: DERMATOLOGY

## 2020-03-02 PROCEDURE — 99999 PR PBB SHADOW E&M-EST. PATIENT-LVL II: ICD-10-PCS | Mod: PBBFAC,,, | Performed by: DERMATOLOGY

## 2020-03-02 PROCEDURE — 99212 OFFICE O/P EST SF 10 MIN: CPT | Mod: PBBFAC,PO | Performed by: DERMATOLOGY

## 2020-03-02 PROCEDURE — 99214 OFFICE O/P EST MOD 30 MIN: CPT | Mod: S$PBB,,, | Performed by: DERMATOLOGY

## 2020-03-02 PROCEDURE — 99214 PR OFFICE/OUTPT VISIT, EST, LEVL IV, 30-39 MIN: ICD-10-PCS | Mod: S$PBB,,, | Performed by: DERMATOLOGY

## 2020-03-02 NOTE — PROGRESS NOTES
Subjective:       Patient ID:  Viv De La Torre is a 70 y.o. female who presents for   Chief Complaint   Patient presents with    Lesion     71 yo F presents for evaluation of skin lesions. Last OV 10/3/2018.     She c/o lesions to face, scalp, R arm (2) x several months. The patient denies any change, including change in color, increase in size, or spontaneous bleeding, associated with this lesion. No prior treatments.    There are a few lesions on her L leg, axilla (L) x few months that have grown in size. Not treating but she does admit to picking at them.    no Phx of NMSC.  no Fhx of melanoma.      Past Medical History:   Diagnosis Date    AK (actinic keratosis)     Allergy     Seasonal    Anxiety     Arthritis of both knees 4/22/2015    Depression     Dizziness     Fibrocystic breast     Fracture of lateral malleolus     High cholesterol     Hypothyroidism due to acquired atrophy of thyroid     Nuclear sclerosis - Both Eyes 5/6/2013    Osteoarthritis 2012    [facility]    SVT (supraventricular tachycardia)      Review of Systems   Skin: Positive for abscesses. Negative for itching, rash, dry skin and daily sunscreen use.        Objective:    Physical Exam   Constitutional: She appears well-developed and well-nourished.   HENT:   Mouth/Throat: Lips normal.    Eyes: Lids are normal.    Neurological: She is alert and oriented to person, place, and time.   Psychiatric: She has a normal mood and affect.   Skin:   Areas Examined (abnormalities noted in diagram):   Scalp / Hair Palpated and Inspected  Head / Face Inspection Performed  Neck Inspection Performed  Chest / Axilla Inspection Performed  Abdomen Inspection Performed  Genitals / Buttocks / Groin Inspection Performed  RUE Inspected  LUE Inspection Performed                   Diagram Legend     Erythematous scaling macule/papule c/w actinic keratosis       Vascular papule c/w angioma      Pigmented verrucoid papule/plaque c/w seborrheic keratosis       Yellow umbilicated papule c/w sebaceous hyperplasia      Irregularly shaped tan macule c/w lentigo     1-2 mm smooth white papules consistent with Milia      Movable subcutaneous cyst with punctum c/w epidermal inclusion cyst      Subcutaneous movable cyst c/w pilar cyst      Firm pink to brown papule c/w dermatofibroma      Pedunculated fleshy papule(s) c/w skin tag(s)      Evenly pigmented macule c/w junctional nevus     Mildly variegated pigmented, slightly irregular-bordered macule c/w mildly atypical nevus      Flesh colored to evenly pigmented papule c/w intradermal nevus       Pink pearly papule/plaque c/w basal cell carcinoma      Erythematous hyperkeratotic cursted plaque c/w SCC      Surgical scar with no sign of skin cancer recurrence      Open and closed comedones      Inflammatory papules and pustules      Verrucoid papule consistent consistent with wart     Erythematous eczematous patches and plaques     Dystrophic onycholytic nail with subungual debris c/w onychomycosis     Umbilicated papule    Erythematous-base heme-crusted tan verrucoid plaque consistent with inflamed seborrheic keratosis     Erythematous Silvery Scaling Plaque c/w Psoriasis     See annotation      Assessment / Plan:        Cherry angioma  This is a benign vascular lesion. Reassurance given. No treatment required.     Seborrheic keratoses  These are benign inherited growths without a malignant potential. Reassurance given to patient. No treatment is necessary.     Cutaneous skin tags  Reassurance  Discussed cryosurgery if lesions are bothersome    Milia  Reassurance         Follow up in about 1 year (around 3/2/2021).

## 2020-04-15 ENCOUNTER — PATIENT MESSAGE (OUTPATIENT)
Dept: ENDOCRINOLOGY | Facility: CLINIC | Age: 70
End: 2020-04-15

## 2020-04-15 ENCOUNTER — PATIENT MESSAGE (OUTPATIENT)
Dept: INFECTIOUS DISEASES | Facility: CLINIC | Age: 70
End: 2020-04-15

## 2020-04-16 ENCOUNTER — PATIENT MESSAGE (OUTPATIENT)
Dept: ENDOCRINOLOGY | Facility: CLINIC | Age: 70
End: 2020-04-16

## 2020-04-16 RX ORDER — ACETAZOLAMIDE 500 MG/1
500 CAPSULE, EXTENDED RELEASE ORAL 2 TIMES DAILY
Qty: 10 CAPSULE | Refills: 0 | Status: SHIPPED | OUTPATIENT
Start: 2020-04-16 | End: 2022-10-26

## 2020-06-15 ENCOUNTER — OFFICE VISIT (OUTPATIENT)
Dept: DERMATOLOGY | Facility: CLINIC | Age: 70
End: 2020-06-15
Payer: MEDICARE

## 2020-06-15 VITALS — RESPIRATION RATE: 18 BRPM | BODY MASS INDEX: 28.83 KG/M2 | WEIGHT: 189.63 LBS

## 2020-06-15 DIAGNOSIS — L98.9 SKIN EROSION: Primary | ICD-10-CM

## 2020-06-15 PROCEDURE — 99213 PR OFFICE/OUTPT VISIT, EST, LEVL III, 20-29 MIN: ICD-10-PCS | Mod: S$PBB,,, | Performed by: DERMATOLOGY

## 2020-06-15 PROCEDURE — 87070 CULTURE OTHR SPECIMN AEROBIC: CPT

## 2020-06-15 PROCEDURE — 99999 PR PBB SHADOW E&M-EST. PATIENT-LVL II: ICD-10-PCS | Mod: PBBFAC,,, | Performed by: DERMATOLOGY

## 2020-06-15 PROCEDURE — 99999 PR PBB SHADOW E&M-EST. PATIENT-LVL II: CPT | Mod: PBBFAC,,, | Performed by: DERMATOLOGY

## 2020-06-15 PROCEDURE — 99213 OFFICE O/P EST LOW 20 MIN: CPT | Mod: S$PBB,,, | Performed by: DERMATOLOGY

## 2020-06-15 PROCEDURE — 99212 OFFICE O/P EST SF 10 MIN: CPT | Mod: PBBFAC,PO | Performed by: DERMATOLOGY

## 2020-06-15 NOTE — PROGRESS NOTES
Subjective:       Patient ID:  Viv De La Torre is a 70 y.o. female who presents for   Chief Complaint   Patient presents with    Foreign Body in Skin     Right cheek      71 y/o F presents for evaluation of a lesion on her cheek. Last seen 3-2-20   Pt stated she fell on Saturday & skinned face on asphalt. Pt has been applying Neosporin.        Review of Systems   Skin: Negative for itching, rash, dry skin, daily sunscreen use, sensitivity to antibiotic ointment, sensitivity to bandage adhesive and abscesses.   Hematologic/Lymphatic: Bruises/bleeds easily.      Past Medical History:   Diagnosis Date    AK (actinic keratosis)     Allergy     Seasonal    Anxiety     Arthritis of both knees 4/22/2015    Depression     Dizziness     Fibrocystic breast     Fracture of lateral malleolus     High cholesterol     Hypothyroidism due to acquired atrophy of thyroid     Nuclear sclerosis - Both Eyes 5/6/2013    Osteoarthritis 2012    [facility]    SVT (supraventricular tachycardia)        Objective:    Physical Exam   Constitutional: She appears well-developed and well-nourished.   Neurological: She is alert and oriented to person, place, and time.   Psychiatric: She has a normal mood and affect.   Skin:   Areas Examined (abnormalities noted in diagram):   Head / Face Inspection Performed  Neck Inspection Performed             Diagram Legend     Erythematous scaling macule/papule c/w actinic keratosis       Vascular papule c/w angioma      Pigmented verrucoid papule/plaque c/w seborrheic keratosis      Yellow umbilicated papule c/w sebaceous hyperplasia      Irregularly shaped tan macule c/w lentigo     1-2 mm smooth white papules consistent with Milia      Movable subcutaneous cyst with punctum c/w epidermal inclusion cyst      Subcutaneous movable cyst c/w pilar cyst      Firm pink to brown papule c/w dermatofibroma      Pedunculated fleshy papule(s) c/w skin tag(s)      Evenly pigmented macule c/w junctional nevus      Mildly variegated pigmented, slightly irregular-bordered macule c/w mildly atypical nevus      Flesh colored to evenly pigmented papule c/w intradermal nevus       Pink pearly papule/plaque c/w basal cell carcinoma      Erythematous hyperkeratotic cursted plaque c/w SCC      Surgical scar with no sign of skin cancer recurrence      Open and closed comedones      Inflammatory papules and pustules      Verrucoid papule consistent consistent with wart     Erythematous eczematous patches and plaques     Dystrophic onycholytic nail with subungual debris c/w onychomycosis     Umbilicated papule    Erythematous-base heme-crusted tan verrucoid plaque consistent with inflamed seborrheic keratosis     Erythematous Silvery Scaling Plaque c/w Psoriasis     See annotation      Assessment / Plan:        Skin erosion  -     Abscess Culture; Future; Expected date: 06/15/2020    Asphalt and debris removed gently with Vaseline and cotton tipped applicator  Gently clean with soap and water daily  Ok to apply topical antibiotic ointment    Once reepithelialized, I recommend BioCorneum          Pt [facility] msg with [facility]

## 2020-06-18 LAB — BACTERIA SPEC AEROBE CULT: ABNORMAL

## 2020-08-24 ENCOUNTER — OFFICE VISIT (OUTPATIENT)
Dept: DERMATOLOGY | Facility: CLINIC | Age: 70
End: 2020-08-24
Payer: MEDICARE

## 2020-08-24 DIAGNOSIS — L98.9 SKIN EROSION: Primary | ICD-10-CM

## 2020-08-24 DIAGNOSIS — L82.1 SEBORRHEIC KERATOSES: ICD-10-CM

## 2020-08-24 PROCEDURE — 99213 OFFICE O/P EST LOW 20 MIN: CPT | Mod: S$PBB,,, | Performed by: DERMATOLOGY

## 2020-08-24 PROCEDURE — 99999 PR PBB SHADOW E&M-EST. PATIENT-LVL II: CPT | Mod: PBBFAC,,, | Performed by: DERMATOLOGY

## 2020-08-24 PROCEDURE — 99213 PR OFFICE/OUTPT VISIT, EST, LEVL III, 20-29 MIN: ICD-10-PCS | Mod: S$PBB,,, | Performed by: DERMATOLOGY

## 2020-08-24 PROCEDURE — 99212 OFFICE O/P EST SF 10 MIN: CPT | Mod: PBBFAC,PO | Performed by: DERMATOLOGY

## 2020-08-24 PROCEDURE — 99999 PR PBB SHADOW E&M-EST. PATIENT-LVL II: ICD-10-PCS | Mod: PBBFAC,,, | Performed by: DERMATOLOGY

## 2020-08-24 NOTE — PROGRESS NOTES
Subjective:       Patient ID:  Viv De La Torre is a 70 y.o. female who presents for   Chief Complaint   Patient presents with    Lesion     Right cheek    Lesion     Under left  nostril      69 y/o F presents for follow up. Last OV 6-15-20   She is concerned about the healing of the lesion on her cheek.  She has been applying BioCorneum daily.     Lesion under left nostril x apprx 3 months, raised, no bleeding, no drainage. Has treated w/ Retin A     Skin erosion  -     Abscess Culture -bacillus species     Asphalt and debris removed gently with Vaseline and cotton tipped applicator at last OV    -Clean with soap and water daily   - Topical antibiotic ointment           Past Medical History:   Diagnosis Date    AK (actinic keratosis)     Allergy     Seasonal    Anxiety     Arthritis of both knees 4/22/2015    Depression     Dizziness     Fibrocystic breast     Fracture of lateral malleolus     High cholesterol     Hypothyroidism due to acquired atrophy of thyroid     Nuclear sclerosis - Both Eyes 5/6/2013    Osteoarthritis 2012    [facility]    SVT (supraventricular tachycardia)        Review of Systems   Constitutional: Negative for fever.   Skin: Negative for itching, rash, dry skin, daily sunscreen use, sensitivity to antibiotic ointment, sensitivity to bandage adhesive and abscesses.   Hematologic/Lymphatic: Bruises/bleeds easily.        Objective:    Physical Exam   Skin:   Areas Examined (abnormalities noted in diagram):   Scalp / Hair Palpated and Inspected  Head / Face Inspection Performed        6/2020 8/2020        Diagram Legend     Erythematous scaling macule/papule c/w actinic keratosis       Vascular papule c/w angioma      Pigmented verrucoid papule/plaque c/w seborrheic keratosis      Yellow umbilicated papule c/w sebaceous hyperplasia      Irregularly shaped tan macule c/w lentigo     1-2 mm smooth white papules consistent with Milia      Movable subcutaneous cyst with punctum c/w  epidermal inclusion cyst      Subcutaneous movable cyst c/w pilar cyst      Firm pink to brown papule c/w dermatofibroma      Pedunculated fleshy papule(s) c/w skin tag(s)      Evenly pigmented macule c/w junctional nevus     Mildly variegated pigmented, slightly irregular-bordered macule c/w mildly atypical nevus      Flesh colored to evenly pigmented papule c/w intradermal nevus       Pink pearly papule/plaque c/w basal cell carcinoma      Erythematous hyperkeratotic cursted plaque c/w SCC      Surgical scar with no sign of skin cancer recurrence      Open and closed comedones      Inflammatory papules and pustules      Verrucoid papule consistent consistent with wart     Erythematous eczematous patches and plaques     Dystrophic onycholytic nail with subungual debris c/w onychomycosis     Umbilicated papule    Erythematous-base heme-crusted tan verrucoid plaque consistent with inflamed seborrheic keratosis     Erythematous Silvery Scaling Plaque c/w Psoriasis     See annotation      Assessment / Plan:        Skin erosion s/p fall  Her wound healed amazingly well in 2 months. Continue BioCorneum.  She is going to a Plastic Surgeon to see if he has any other advice to offer    Seborrheic keratoses  Suspect early SK (1-2 mm slightly elevated flesh colored papule)  Discussed cryo but pt would like to hold off for now         Follow up if symptoms worsen or fail to improve.

## 2020-09-16 ENCOUNTER — IMMUNIZATION (OUTPATIENT)
Dept: FAMILY MEDICINE | Facility: CLINIC | Age: 70
End: 2020-09-16
Payer: MEDICARE

## 2020-09-16 PROCEDURE — 90694 VACC AIIV4 NO PRSRV 0.5ML IM: CPT | Mod: PBBFAC,PO

## 2020-09-16 PROCEDURE — G0008 ADMIN INFLUENZA VIRUS VAC: HCPCS | Mod: PBBFAC,PO

## 2020-10-01 ENCOUNTER — LAB VISIT (OUTPATIENT)
Dept: PRIMARY CARE CLINIC | Facility: OTHER | Age: 70
End: 2020-10-01
Attending: INTERNAL MEDICINE
Payer: MEDICARE

## 2020-10-01 DIAGNOSIS — Z03.818 ENCOUNTER FOR OBSERVATION FOR SUSPECTED EXPOSURE TO OTHER BIOLOGICAL AGENTS RULED OUT: ICD-10-CM

## 2020-10-01 PROCEDURE — U0003 INFECTIOUS AGENT DETECTION BY NUCLEIC ACID (DNA OR RNA); SEVERE ACUTE RESPIRATORY SYNDROME CORONAVIRUS 2 (SARS-COV-2) (CORONAVIRUS DISEASE [COVID-19]), AMPLIFIED PROBE TECHNIQUE, MAKING USE OF HIGH THROUGHPUT TECHNOLOGIES AS DESCRIBED BY CMS-2020-01-R: HCPCS

## 2020-10-02 ENCOUNTER — PATIENT MESSAGE (OUTPATIENT)
Dept: DERMATOLOGY | Facility: CLINIC | Age: 70
End: 2020-10-02

## 2020-10-03 LAB — SARS-COV-2 RNA RESP QL NAA+PROBE: NOT DETECTED

## 2020-12-07 DIAGNOSIS — E03.4 HYPOTHYROIDISM DUE TO ACQUIRED ATROPHY OF THYROID: ICD-10-CM

## 2020-12-07 RX ORDER — LEVOTHYROXINE SODIUM 25 UG/1
25 TABLET ORAL DAILY
Qty: 90 TABLET | Refills: 0 | Status: SHIPPED | OUTPATIENT
Start: 2020-12-07 | End: 2021-01-28 | Stop reason: SDUPTHER

## 2021-01-26 ENCOUNTER — TELEPHONE (OUTPATIENT)
Dept: ELECTROPHYSIOLOGY | Facility: CLINIC | Age: 71
End: 2021-01-26

## 2021-01-26 DIAGNOSIS — I49.8 OTHER SPECIFIED CARDIAC ARRHYTHMIAS: Primary | ICD-10-CM

## 2021-02-10 ENCOUNTER — HOSPITAL ENCOUNTER (OUTPATIENT)
Dept: CARDIOLOGY | Facility: CLINIC | Age: 71
Discharge: HOME | End: 2021-02-10
Payer: MEDICARE

## 2021-02-10 ENCOUNTER — OFFICE VISIT (OUTPATIENT)
Dept: ELECTROPHYSIOLOGY | Facility: CLINIC | Age: 71
End: 2021-02-10
Payer: MEDICARE

## 2021-02-10 VITALS
BODY MASS INDEX: 32.74 KG/M2 | DIASTOLIC BLOOD PRESSURE: 70 MMHG | HEART RATE: 54 BPM | SYSTOLIC BLOOD PRESSURE: 119 MMHG | HEIGHT: 68 IN | WEIGHT: 216.06 LBS

## 2021-02-10 DIAGNOSIS — E66.9 OBESITY, CLASS I, BMI 30-34.9: Chronic | ICD-10-CM

## 2021-02-10 DIAGNOSIS — R00.2 PALPITATIONS: ICD-10-CM

## 2021-02-10 DIAGNOSIS — I49.3 PVC (PREMATURE VENTRICULAR CONTRACTION): ICD-10-CM

## 2021-02-10 DIAGNOSIS — I47.10 SVT (SUPRAVENTRICULAR TACHYCARDIA): Primary | ICD-10-CM

## 2021-02-10 DIAGNOSIS — I49.8 OTHER SPECIFIED CARDIAC ARRHYTHMIAS: ICD-10-CM

## 2021-02-10 PROCEDURE — 99999 PR PBB SHADOW E&M-EST. PATIENT-LVL IV: CPT | Mod: PBBFAC,,, | Performed by: NURSE PRACTITIONER

## 2021-02-10 PROCEDURE — 99999 PR PBB SHADOW E&M-EST. PATIENT-LVL IV: ICD-10-PCS | Mod: PBBFAC,,, | Performed by: NURSE PRACTITIONER

## 2021-02-10 PROCEDURE — 93005 ELECTROCARDIOGRAM TRACING: CPT | Mod: PBBFAC | Performed by: INTERNAL MEDICINE

## 2021-02-10 PROCEDURE — 93010 RHYTHM STRIP: ICD-10-PCS | Mod: S$PBB,,, | Performed by: INTERNAL MEDICINE

## 2021-02-10 PROCEDURE — 99214 PR OFFICE/OUTPT VISIT, EST, LEVL IV, 30-39 MIN: ICD-10-PCS | Mod: S$PBB,,, | Performed by: NURSE PRACTITIONER

## 2021-02-10 PROCEDURE — 99214 OFFICE O/P EST MOD 30 MIN: CPT | Mod: PBBFAC,25 | Performed by: NURSE PRACTITIONER

## 2021-02-10 PROCEDURE — 93010 ELECTROCARDIOGRAM REPORT: CPT | Mod: S$PBB,,, | Performed by: INTERNAL MEDICINE

## 2021-02-10 PROCEDURE — 99214 OFFICE O/P EST MOD 30 MIN: CPT | Mod: S$PBB,,, | Performed by: NURSE PRACTITIONER

## 2021-02-10 RX ORDER — ATENOLOL 50 MG/1
50 TABLET ORAL 2 TIMES DAILY
Qty: 180 TABLET | Refills: 3 | Status: SHIPPED | OUTPATIENT
Start: 2021-02-10 | End: 2022-10-26

## 2021-03-08 ENCOUNTER — OFFICE VISIT (OUTPATIENT)
Dept: ENDOCRINOLOGY | Facility: CLINIC | Age: 71
End: 2021-03-08
Payer: MEDICARE

## 2021-03-08 VITALS
OXYGEN SATURATION: 94 % | WEIGHT: 202.38 LBS | HEIGHT: 68 IN | SYSTOLIC BLOOD PRESSURE: 116 MMHG | RESPIRATION RATE: 18 BRPM | BODY MASS INDEX: 30.67 KG/M2 | HEART RATE: 68 BPM | DIASTOLIC BLOOD PRESSURE: 70 MMHG

## 2021-03-08 DIAGNOSIS — E03.4 HYPOTHYROIDISM DUE TO ACQUIRED ATROPHY OF THYROID: Primary | ICD-10-CM

## 2021-03-08 DIAGNOSIS — R74.8 ELEVATED LIVER ENZYMES: ICD-10-CM

## 2021-03-08 DIAGNOSIS — E78.00 HYPERCHOLESTEROLEMIA: ICD-10-CM

## 2021-03-08 PROCEDURE — 99999 PR PBB SHADOW E&M-EST. PATIENT-LVL IV: CPT | Mod: PBBFAC,,, | Performed by: INTERNAL MEDICINE

## 2021-03-08 PROCEDURE — 99214 OFFICE O/P EST MOD 30 MIN: CPT | Mod: PBBFAC | Performed by: INTERNAL MEDICINE

## 2021-03-08 PROCEDURE — 99213 OFFICE O/P EST LOW 20 MIN: CPT | Mod: S$PBB,,, | Performed by: INTERNAL MEDICINE

## 2021-03-08 PROCEDURE — 99999 PR PBB SHADOW E&M-EST. PATIENT-LVL IV: ICD-10-PCS | Mod: PBBFAC,,, | Performed by: INTERNAL MEDICINE

## 2021-03-08 PROCEDURE — 99213 PR OFFICE/OUTPT VISIT, EST, LEVL III, 20-29 MIN: ICD-10-PCS | Mod: S$PBB,,, | Performed by: INTERNAL MEDICINE

## 2021-03-08 RX ORDER — LEVOTHYROXINE SODIUM 25 UG/1
25 TABLET ORAL DAILY
Qty: 90 TABLET | Refills: 3 | Status: SHIPPED | OUTPATIENT
Start: 2021-03-08 | End: 2023-01-20 | Stop reason: SDUPTHER

## 2021-03-08 RX ORDER — ROSUVASTATIN CALCIUM 10 MG/1
10 TABLET, COATED ORAL NIGHTLY
Qty: 90 TABLET | Refills: 3 | Status: SHIPPED | OUTPATIENT
Start: 2021-03-08 | End: 2021-03-10 | Stop reason: SDUPTHER

## 2021-03-10 ENCOUNTER — PATIENT MESSAGE (OUTPATIENT)
Dept: GASTROENTEROLOGY | Facility: CLINIC | Age: 71
End: 2021-03-10

## 2021-03-10 ENCOUNTER — OFFICE VISIT (OUTPATIENT)
Dept: FAMILY MEDICINE | Facility: CLINIC | Age: 71
End: 2021-03-10
Payer: MEDICARE

## 2021-03-10 ENCOUNTER — TELEPHONE (OUTPATIENT)
Dept: GASTROENTEROLOGY | Facility: CLINIC | Age: 71
End: 2021-03-10

## 2021-03-10 VITALS
SYSTOLIC BLOOD PRESSURE: 114 MMHG | HEART RATE: 80 BPM | HEIGHT: 68 IN | WEIGHT: 201.06 LBS | DIASTOLIC BLOOD PRESSURE: 80 MMHG | OXYGEN SATURATION: 98 % | BODY MASS INDEX: 30.47 KG/M2

## 2021-03-10 DIAGNOSIS — E66.9 OBESITY, CLASS I, BMI 30-34.9: Chronic | ICD-10-CM

## 2021-03-10 DIAGNOSIS — R79.89 ELEVATED LFTS: ICD-10-CM

## 2021-03-10 DIAGNOSIS — F32.5 MAJOR DEPRESSIVE DISORDER WITH SINGLE EPISODE, IN FULL REMISSION: ICD-10-CM

## 2021-03-10 DIAGNOSIS — Z12.31 ENCOUNTER FOR SCREENING MAMMOGRAM FOR BREAST CANCER: ICD-10-CM

## 2021-03-10 DIAGNOSIS — Z12.11 SCREENING FOR COLON CANCER: Primary | ICD-10-CM

## 2021-03-10 DIAGNOSIS — E03.4 HYPOTHYROIDISM DUE TO ACQUIRED ATROPHY OF THYROID: ICD-10-CM

## 2021-03-10 DIAGNOSIS — E78.00 HYPERCHOLESTEROLEMIA: ICD-10-CM

## 2021-03-10 PROBLEM — M77.42 METATARSALGIA, LEFT FOOT: Status: RESOLVED | Noted: 2017-08-11 | Resolved: 2021-03-10

## 2021-03-10 PROBLEM — G57.62 MORTON'S NEUROMA OF LEFT FOOT: Status: RESOLVED | Noted: 2017-08-11 | Resolved: 2021-03-10

## 2021-03-10 PROBLEM — M65.342 TRIGGER FINGER, LEFT RING FINGER: Status: RESOLVED | Noted: 2017-07-25 | Resolved: 2021-03-10

## 2021-03-10 PROBLEM — Z98.890 POST-OPERATIVE STATE: Status: RESOLVED | Noted: 2020-01-16 | Resolved: 2021-03-10

## 2021-03-10 PROCEDURE — 99214 OFFICE O/P EST MOD 30 MIN: CPT | Mod: S$PBB,,, | Performed by: INTERNAL MEDICINE

## 2021-03-10 PROCEDURE — 99214 PR OFFICE/OUTPT VISIT, EST, LEVL IV, 30-39 MIN: ICD-10-PCS | Mod: S$PBB,,, | Performed by: INTERNAL MEDICINE

## 2021-03-10 PROCEDURE — 99999 PR PBB SHADOW E&M-EST. PATIENT-LVL III: CPT | Mod: PBBFAC,,, | Performed by: INTERNAL MEDICINE

## 2021-03-10 PROCEDURE — 99213 OFFICE O/P EST LOW 20 MIN: CPT | Mod: PBBFAC,PO | Performed by: INTERNAL MEDICINE

## 2021-03-10 PROCEDURE — 99999 PR PBB SHADOW E&M-EST. PATIENT-LVL III: ICD-10-PCS | Mod: PBBFAC,,, | Performed by: INTERNAL MEDICINE

## 2021-03-10 RX ORDER — SERTRALINE HYDROCHLORIDE 100 MG/1
100 TABLET, FILM COATED ORAL DAILY
Qty: 90 TABLET | Refills: 3 | Status: SHIPPED | OUTPATIENT
Start: 2021-03-10

## 2021-03-10 RX ORDER — ROSUVASTATIN CALCIUM 10 MG/1
10 TABLET, COATED ORAL NIGHTLY
Qty: 90 TABLET | Refills: 3 | Status: SHIPPED | OUTPATIENT
Start: 2021-03-10 | End: 2022-08-25 | Stop reason: SDUPTHER

## 2021-03-11 ENCOUNTER — TELEPHONE (OUTPATIENT)
Dept: GASTROENTEROLOGY | Facility: CLINIC | Age: 71
End: 2021-03-11

## 2021-03-11 ENCOUNTER — HOSPITAL ENCOUNTER (OUTPATIENT)
Dept: RADIOLOGY | Facility: HOSPITAL | Age: 71
Discharge: HOME | End: 2021-03-11
Attending: INTERNAL MEDICINE
Payer: MEDICARE

## 2021-03-11 DIAGNOSIS — Z20.822 ENCOUNTER FOR LABORATORY TESTING FOR COVID-19 VIRUS: ICD-10-CM

## 2021-03-11 DIAGNOSIS — Z12.31 ENCOUNTER FOR SCREENING MAMMOGRAM FOR BREAST CANCER: ICD-10-CM

## 2021-03-11 PROCEDURE — 77067 SCR MAMMO BI INCL CAD: CPT | Mod: 26,,, | Performed by: RADIOLOGY

## 2021-03-11 PROCEDURE — 77063 BREAST TOMOSYNTHESIS BI: CPT | Mod: 26,,, | Performed by: RADIOLOGY

## 2021-03-11 PROCEDURE — 77067 MAMMO DIGITAL SCREENING BILAT WITH TOMO: ICD-10-PCS | Mod: 26,,, | Performed by: RADIOLOGY

## 2021-03-11 PROCEDURE — 77067 SCR MAMMO BI INCL CAD: CPT | Mod: TC,PO

## 2021-03-11 PROCEDURE — 77063 MAMMO DIGITAL SCREENING BILAT WITH TOMO: ICD-10-PCS | Mod: 26,,, | Performed by: RADIOLOGY

## 2021-03-12 ENCOUNTER — LAB VISIT (OUTPATIENT)
Dept: LAB | Facility: HOSPITAL | Age: 71
End: 2021-03-12
Attending: INTERNAL MEDICINE
Payer: MEDICARE

## 2021-03-12 DIAGNOSIS — R74.8 ELEVATED LIVER ENZYMES: ICD-10-CM

## 2021-03-12 DIAGNOSIS — E03.4 HYPOTHYROIDISM DUE TO ACQUIRED ATROPHY OF THYROID: ICD-10-CM

## 2021-03-12 DIAGNOSIS — E78.00 HYPERCHOLESTEROLEMIA: ICD-10-CM

## 2021-03-12 LAB
ALBUMIN SERPL BCP-MCNC: 3.5 G/DL (ref 3.5–5.2)
ALP SERPL-CCNC: 74 U/L (ref 55–135)
ALT SERPL W/O P-5'-P-CCNC: 56 U/L (ref 10–44)
ANION GAP SERPL CALC-SCNC: 8 MMOL/L (ref 8–16)
AST SERPL-CCNC: 37 U/L (ref 10–40)
BASOPHILS # BLD AUTO: 0.05 K/UL (ref 0–0.2)
BASOPHILS NFR BLD: 1 % (ref 0–1.9)
BILIRUB SERPL-MCNC: 0.4 MG/DL (ref 0.1–1)
BUN SERPL-MCNC: 14 MG/DL (ref 8–23)
CALCIUM SERPL-MCNC: 8.8 MG/DL (ref 8.7–10.5)
CHLORIDE SERPL-SCNC: 107 MMOL/L (ref 95–110)
CHOLEST SERPL-MCNC: 223 MG/DL (ref 120–199)
CHOLEST/HDLC SERPL: 3.8 {RATIO} (ref 2–5)
CO2 SERPL-SCNC: 24 MMOL/L (ref 23–29)
CREAT SERPL-MCNC: 0.8 MG/DL (ref 0.5–1.4)
DIFFERENTIAL METHOD: ABNORMAL
EOSINOPHIL # BLD AUTO: 0.1 K/UL (ref 0–0.5)
EOSINOPHIL NFR BLD: 2.7 % (ref 0–8)
ERYTHROCYTE [DISTWIDTH] IN BLOOD BY AUTOMATED COUNT: 12.5 % (ref 11.5–14.5)
EST. GFR  (AFRICAN AMERICAN): >60 ML/MIN/1.73 M^2
EST. GFR  (NON AFRICAN AMERICAN): >60 ML/MIN/1.73 M^2
GLUCOSE SERPL-MCNC: 92 MG/DL (ref 70–110)
HCT VFR BLD AUTO: 40 % (ref 37–48.5)
HDLC SERPL-MCNC: 59 MG/DL (ref 40–75)
HDLC SERPL: 26.5 % (ref 20–50)
HGB BLD-MCNC: 13 G/DL (ref 12–16)
IMM GRANULOCYTES # BLD AUTO: 0.03 K/UL (ref 0–0.04)
IMM GRANULOCYTES NFR BLD AUTO: 0.6 % (ref 0–0.5)
LDLC SERPL CALC-MCNC: 141.6 MG/DL (ref 63–159)
LYMPHOCYTES # BLD AUTO: 1.7 K/UL (ref 1–4.8)
LYMPHOCYTES NFR BLD: 33.3 % (ref 18–48)
MCH RBC QN AUTO: 31 PG (ref 27–31)
MCHC RBC AUTO-ENTMCNC: 32.5 G/DL (ref 32–36)
MCV RBC AUTO: 96 FL (ref 82–98)
MONOCYTES # BLD AUTO: 0.5 K/UL (ref 0.3–1)
MONOCYTES NFR BLD: 10.3 % (ref 4–15)
NEUTROPHILS # BLD AUTO: 2.7 K/UL (ref 1.8–7.7)
NEUTROPHILS NFR BLD: 52.1 % (ref 38–73)
NONHDLC SERPL-MCNC: 164 MG/DL
NRBC BLD-RTO: 0 /100 WBC
PLATELET # BLD AUTO: 295 K/UL (ref 150–350)
PMV BLD AUTO: 10.8 FL (ref 9.2–12.9)
POTASSIUM SERPL-SCNC: 4.2 MMOL/L (ref 3.5–5.1)
PROT SERPL-MCNC: 7 G/DL (ref 6–8.4)
RBC # BLD AUTO: 4.19 M/UL (ref 4–5.4)
SODIUM SERPL-SCNC: 139 MMOL/L (ref 136–145)
TRIGL SERPL-MCNC: 112 MG/DL (ref 30–150)
TSH SERPL DL<=0.005 MIU/L-ACNC: 3.6 UIU/ML (ref 0.4–4)
WBC # BLD AUTO: 5.14 K/UL (ref 3.9–12.7)

## 2021-03-12 PROCEDURE — 80053 COMPREHEN METABOLIC PANEL: CPT | Performed by: INTERNAL MEDICINE

## 2021-03-12 PROCEDURE — 84443 ASSAY THYROID STIM HORMONE: CPT | Performed by: INTERNAL MEDICINE

## 2021-03-12 PROCEDURE — 36415 COLL VENOUS BLD VENIPUNCTURE: CPT | Mod: PO | Performed by: INTERNAL MEDICINE

## 2021-03-12 PROCEDURE — 85025 COMPLETE CBC W/AUTO DIFF WBC: CPT | Performed by: INTERNAL MEDICINE

## 2021-03-12 PROCEDURE — 80061 LIPID PANEL: CPT | Performed by: INTERNAL MEDICINE

## 2021-03-15 ENCOUNTER — PATIENT MESSAGE (OUTPATIENT)
Dept: ENDOCRINOLOGY | Facility: CLINIC | Age: 71
End: 2021-03-15

## 2021-03-17 ENCOUNTER — TELEPHONE (OUTPATIENT)
Dept: RADIOLOGY | Facility: HOSPITAL | Age: 71
End: 2021-03-17

## 2021-03-25 ENCOUNTER — OFFICE VISIT (OUTPATIENT)
Dept: DERMATOLOGY | Facility: CLINIC | Age: 71
End: 2021-03-25
Payer: MEDICARE

## 2021-03-25 VITALS — BODY MASS INDEX: 30.47 KG/M2 | RESPIRATION RATE: 18 BRPM | HEIGHT: 68 IN | WEIGHT: 201.06 LBS

## 2021-03-25 DIAGNOSIS — L82.1 SEBORRHEIC KERATOSES: ICD-10-CM

## 2021-03-25 DIAGNOSIS — L57.0 AK (ACTINIC KERATOSIS): ICD-10-CM

## 2021-03-25 DIAGNOSIS — D48.9 NEOPLASM OF UNCERTAIN BEHAVIOR: Primary | ICD-10-CM

## 2021-03-25 DIAGNOSIS — L73.9 FOLLICULITIS: ICD-10-CM

## 2021-03-25 DIAGNOSIS — D18.01 ANGIOMA OF SKIN: ICD-10-CM

## 2021-03-25 PROCEDURE — 11102 TANGNTL BX SKIN SINGLE LES: CPT | Mod: 59,PBBFAC,PO | Performed by: DERMATOLOGY

## 2021-03-25 PROCEDURE — 88305 TISSUE EXAM BY PATHOLOGIST: CPT | Mod: 26,,, | Performed by: PATHOLOGY

## 2021-03-25 PROCEDURE — 99999 PR PBB SHADOW E&M-EST. PATIENT-LVL III: ICD-10-PCS | Mod: PBBFAC,,, | Performed by: DERMATOLOGY

## 2021-03-25 PROCEDURE — 99999 PR PBB SHADOW E&M-EST. PATIENT-LVL III: CPT | Mod: PBBFAC,,, | Performed by: DERMATOLOGY

## 2021-03-25 PROCEDURE — 17000 DESTRUCT PREMALG LESION: CPT | Mod: PBBFAC,PO | Performed by: DERMATOLOGY

## 2021-03-25 PROCEDURE — 99213 OFFICE O/P EST LOW 20 MIN: CPT | Mod: PBBFAC,PO,25 | Performed by: DERMATOLOGY

## 2021-03-25 PROCEDURE — 99214 OFFICE O/P EST MOD 30 MIN: CPT | Mod: 25,S$PBB,, | Performed by: DERMATOLOGY

## 2021-03-25 PROCEDURE — 11102 TANGNTL BX SKIN SINGLE LES: CPT | Mod: S$PBB,,, | Performed by: DERMATOLOGY

## 2021-03-25 PROCEDURE — 17000 PR DESTRUCTION(LASER SURGERY,CRYOSURGERY,CHEMOSURGERY),PREMALIGNANT LESIONS,FIRST LESION: ICD-10-PCS | Mod: S$PBB,59,, | Performed by: DERMATOLOGY

## 2021-03-25 PROCEDURE — 88305 TISSUE EXAM BY PATHOLOGIST: CPT | Performed by: PATHOLOGY

## 2021-03-25 PROCEDURE — 17000 DESTRUCT PREMALG LESION: CPT | Mod: S$PBB,59,, | Performed by: DERMATOLOGY

## 2021-03-25 PROCEDURE — 88305 TISSUE EXAM BY PATHOLOGIST: ICD-10-PCS | Mod: 26,,, | Performed by: PATHOLOGY

## 2021-03-25 PROCEDURE — 99214 PR OFFICE/OUTPT VISIT, EST, LEVL IV, 30-39 MIN: ICD-10-PCS | Mod: 25,S$PBB,, | Performed by: DERMATOLOGY

## 2021-03-25 PROCEDURE — 11102 PR TANGENTIAL BIOPSY, SKIN, SINGLE LESION: ICD-10-PCS | Mod: S$PBB,,, | Performed by: DERMATOLOGY

## 2021-03-25 RX ORDER — CLINDAMYCIN PHOSPHATE 10 UG/ML
LOTION TOPICAL 2 TIMES DAILY
Qty: 60 ML | Refills: 2 | Status: SHIPPED | OUTPATIENT
Start: 2021-03-25 | End: 2022-10-26

## 2021-03-29 LAB
FINAL PATHOLOGIC DIAGNOSIS: NORMAL
GROSS: NORMAL
MICROSCOPIC EXAM: NORMAL

## 2021-03-31 ENCOUNTER — HOSPITAL ENCOUNTER (OUTPATIENT)
Dept: RADIOLOGY | Facility: HOSPITAL | Age: 71
Discharge: HOME | End: 2021-03-31
Attending: INTERNAL MEDICINE
Payer: MEDICARE

## 2021-03-31 ENCOUNTER — TELEPHONE (OUTPATIENT)
Dept: DERMATOLOGY | Facility: CLINIC | Age: 71
End: 2021-03-31

## 2021-03-31 DIAGNOSIS — R92.8 ABNORMAL MAMMOGRAM: ICD-10-CM

## 2021-03-31 PROCEDURE — 77066 DX MAMMO INCL CAD BI: CPT | Mod: 26,,, | Performed by: RADIOLOGY

## 2021-03-31 PROCEDURE — 77066 MAMMO DIGITAL DIAGNOSTIC BILAT WITH TOMO: ICD-10-PCS | Mod: 26,,, | Performed by: RADIOLOGY

## 2021-03-31 PROCEDURE — 77062 MAMMO DIGITAL DIAGNOSTIC BILAT WITH TOMO: ICD-10-PCS | Mod: 26,,, | Performed by: RADIOLOGY

## 2021-03-31 PROCEDURE — 77066 DX MAMMO INCL CAD BI: CPT | Mod: TC,PO

## 2021-03-31 PROCEDURE — 76642 US BREAST RIGHT LIMITED: ICD-10-PCS | Mod: 26,RT,, | Performed by: RADIOLOGY

## 2021-03-31 PROCEDURE — 77062 BREAST TOMOSYNTHESIS BI: CPT | Mod: 26,,, | Performed by: RADIOLOGY

## 2021-03-31 PROCEDURE — 76642 ULTRASOUND BREAST LIMITED: CPT | Mod: TC,PO,RT

## 2021-03-31 PROCEDURE — 76642 ULTRASOUND BREAST LIMITED: CPT | Mod: 26,RT,, | Performed by: RADIOLOGY

## 2021-04-09 ENCOUNTER — TELEPHONE (OUTPATIENT)
Dept: DERMATOLOGY | Facility: CLINIC | Age: 71
End: 2021-04-09

## 2021-04-16 ENCOUNTER — PATIENT MESSAGE (OUTPATIENT)
Dept: RESEARCH | Facility: HOSPITAL | Age: 71
End: 2021-04-16

## 2021-05-03 ENCOUNTER — PATIENT MESSAGE (OUTPATIENT)
Dept: ENDOSCOPY | Facility: HOSPITAL | Age: 71
End: 2021-05-03

## 2021-05-03 ENCOUNTER — LAB VISIT (OUTPATIENT)
Dept: FAMILY MEDICINE | Facility: CLINIC | Age: 71
End: 2021-05-03
Payer: MEDICARE

## 2021-05-03 DIAGNOSIS — Z20.822 ENCOUNTER FOR LABORATORY TESTING FOR COVID-19 VIRUS: ICD-10-CM

## 2021-05-03 PROCEDURE — U0005 INFEC AGEN DETEC AMPLI PROBE: HCPCS | Performed by: INTERNAL MEDICINE

## 2021-05-03 PROCEDURE — U0003 INFECTIOUS AGENT DETECTION BY NUCLEIC ACID (DNA OR RNA); SEVERE ACUTE RESPIRATORY SYNDROME CORONAVIRUS 2 (SARS-COV-2) (CORONAVIRUS DISEASE [COVID-19]), AMPLIFIED PROBE TECHNIQUE, MAKING USE OF HIGH THROUGHPUT TECHNOLOGIES AS DESCRIBED BY CMS-2020-01-R: HCPCS | Performed by: INTERNAL MEDICINE

## 2021-05-04 LAB — SARS-COV-2 RNA RESP QL NAA+PROBE: NOT DETECTED

## 2021-05-06 ENCOUNTER — ANESTHESIA (OUTPATIENT)
Dept: ENDOSCOPY | Facility: HOSPITAL | Age: 71
End: 2021-05-06
Payer: MEDICARE

## 2021-05-06 ENCOUNTER — ANESTHESIA EVENT (OUTPATIENT)
Dept: ENDOSCOPY | Facility: HOSPITAL | Age: 71
End: 2021-05-06
Payer: MEDICARE

## 2021-05-06 ENCOUNTER — HOSPITAL ENCOUNTER (OUTPATIENT)
Facility: HOSPITAL | Age: 71
Discharge: HOME | End: 2021-05-06
Attending: INTERNAL MEDICINE | Admitting: INTERNAL MEDICINE
Payer: MEDICARE

## 2021-05-06 VITALS
SYSTOLIC BLOOD PRESSURE: 111 MMHG | RESPIRATION RATE: 16 BRPM | HEIGHT: 67 IN | WEIGHT: 193 LBS | BODY MASS INDEX: 30.29 KG/M2 | OXYGEN SATURATION: 99 % | HEART RATE: 61 BPM | DIASTOLIC BLOOD PRESSURE: 73 MMHG | TEMPERATURE: 98 F

## 2021-05-06 DIAGNOSIS — Z12.11 SCREENING FOR COLON CANCER: Primary | ICD-10-CM

## 2021-05-06 PROCEDURE — 88305 TISSUE EXAM BY PATHOLOGIST: CPT | Mod: 26,,, | Performed by: PATHOLOGY

## 2021-05-06 PROCEDURE — 45385 PR COLONOSCOPY,REMV LESN,SNARE: ICD-10-PCS | Mod: PT,,, | Performed by: INTERNAL MEDICINE

## 2021-05-06 PROCEDURE — 37000009 HC ANESTHESIA EA ADD 15 MINS: Mod: PO | Performed by: INTERNAL MEDICINE

## 2021-05-06 PROCEDURE — D9220A PRA ANESTHESIA: Mod: PT,CRNA,, | Performed by: NURSE ANESTHETIST, CERTIFIED REGISTERED

## 2021-05-06 PROCEDURE — 63600175 PHARM REV CODE 636 W HCPCS: Mod: PO | Performed by: NURSE ANESTHETIST, CERTIFIED REGISTERED

## 2021-05-06 PROCEDURE — 45385 COLONOSCOPY W/LESION REMOVAL: CPT | Mod: PT,,, | Performed by: INTERNAL MEDICINE

## 2021-05-06 PROCEDURE — 63600175 PHARM REV CODE 636 W HCPCS: Mod: PO | Performed by: INTERNAL MEDICINE

## 2021-05-06 PROCEDURE — 45385 COLONOSCOPY W/LESION REMOVAL: CPT | Mod: PO | Performed by: INTERNAL MEDICINE

## 2021-05-06 PROCEDURE — 88305 TISSUE EXAM BY PATHOLOGIST: CPT | Performed by: PATHOLOGY

## 2021-05-06 PROCEDURE — 37000008 HC ANESTHESIA 1ST 15 MINUTES: Mod: PO | Performed by: INTERNAL MEDICINE

## 2021-05-06 PROCEDURE — 88305 TISSUE EXAM BY PATHOLOGIST: ICD-10-PCS | Mod: 26,,, | Performed by: PATHOLOGY

## 2021-05-06 PROCEDURE — D9220A PRA ANESTHESIA: ICD-10-PCS | Mod: PT,CRNA,, | Performed by: NURSE ANESTHETIST, CERTIFIED REGISTERED

## 2021-05-06 PROCEDURE — 27201089 HC SNARE, DISP (ANY): Mod: PO | Performed by: INTERNAL MEDICINE

## 2021-05-06 PROCEDURE — 25000003 PHARM REV CODE 250: Mod: PO | Performed by: NURSE ANESTHETIST, CERTIFIED REGISTERED

## 2021-05-06 PROCEDURE — D9220A PRA ANESTHESIA: Mod: PT,ANES,, | Performed by: ANESTHESIOLOGY

## 2021-05-06 PROCEDURE — D9220A PRA ANESTHESIA: ICD-10-PCS | Mod: PT,ANES,, | Performed by: ANESTHESIOLOGY

## 2021-05-06 RX ORDER — SODIUM CHLORIDE 0.9 % (FLUSH) 0.9 %
10 SYRINGE (ML) INJECTION EVERY 6 HOURS PRN
Status: DISCONTINUED | OUTPATIENT
Start: 2021-05-06 | End: 2021-05-06 | Stop reason: HOSPADM

## 2021-05-06 RX ORDER — LIDOCAINE HYDROCHLORIDE 20 MG/ML
INJECTION INTRAVENOUS
Status: DISCONTINUED | OUTPATIENT
Start: 2021-05-06 | End: 2021-05-10

## 2021-05-06 RX ORDER — PROPOFOL 10 MG/ML
VIAL (ML) INTRAVENOUS CONTINUOUS PRN
Status: DISCONTINUED | OUTPATIENT
Start: 2021-05-06 | End: 2021-05-10

## 2021-05-06 RX ORDER — SODIUM CHLORIDE, SODIUM LACTATE, POTASSIUM CHLORIDE, CALCIUM CHLORIDE 600; 310; 30; 20 MG/100ML; MG/100ML; MG/100ML; MG/100ML
INJECTION, SOLUTION INTRAVENOUS CONTINUOUS
Status: DISCONTINUED | OUTPATIENT
Start: 2021-05-06 | End: 2021-05-06 | Stop reason: HOSPADM

## 2021-05-06 RX ORDER — PROPOFOL 10 MG/ML
VIAL (ML) INTRAVENOUS
Status: DISCONTINUED | OUTPATIENT
Start: 2021-05-06 | End: 2021-05-10

## 2021-05-06 RX ADMIN — SODIUM CHLORIDE, SODIUM LACTATE, POTASSIUM CHLORIDE, AND CALCIUM CHLORIDE: .6; .31; .03; .02 INJECTION, SOLUTION INTRAVENOUS at 09:05

## 2021-05-06 RX ADMIN — PROPOFOL 100 MG: 10 INJECTION, EMULSION INTRAVENOUS at 09:05

## 2021-05-06 RX ADMIN — PROPOFOL 150 MCG/KG/MIN: 10 INJECTION, EMULSION INTRAVENOUS at 09:05

## 2021-05-06 RX ADMIN — LIDOCAINE HYDROCHLORIDE 100 MG: 20 INJECTION, SOLUTION INTRAVENOUS at 09:05

## 2021-05-18 LAB
FINAL PATHOLOGIC DIAGNOSIS: NORMAL
GROSS: NORMAL
Lab: NORMAL

## 2021-05-31 ENCOUNTER — PROCEDURE VISIT (OUTPATIENT)
Dept: DERMATOLOGY | Facility: CLINIC | Age: 71
End: 2021-05-31
Payer: MEDICARE

## 2021-05-31 VITALS — HEIGHT: 67 IN | BODY MASS INDEX: 30.29 KG/M2 | WEIGHT: 193 LBS

## 2021-05-31 DIAGNOSIS — C44.519 BASAL CELL CARCINOMA (BCC) OF BACK: Primary | ICD-10-CM

## 2021-05-31 PROCEDURE — 17261 DSTRJ MAL LES T/A/L .6-1.0CM: CPT | Mod: S$PBB,,, | Performed by: DERMATOLOGY

## 2021-05-31 PROCEDURE — 17261 PR DESTR MALIG TRUNK,EXTREM 0.6-1 CM: ICD-10-PCS | Mod: S$PBB,,, | Performed by: DERMATOLOGY

## 2021-05-31 PROCEDURE — 99499 UNLISTED E&M SERVICE: CPT | Mod: S$PBB,,, | Performed by: DERMATOLOGY

## 2021-05-31 PROCEDURE — 99499 NO LOS: ICD-10-PCS | Mod: S$PBB,,, | Performed by: DERMATOLOGY

## 2021-05-31 PROCEDURE — 17261 DSTRJ MAL LES T/A/L .6-1.0CM: CPT | Mod: PBBFAC,PO | Performed by: DERMATOLOGY

## 2021-07-21 ENCOUNTER — OFFICE VISIT (OUTPATIENT)
Dept: URGENT CARE | Facility: CLINIC | Age: 71
End: 2021-07-21
Payer: MEDICARE

## 2021-07-21 VITALS
DIASTOLIC BLOOD PRESSURE: 75 MMHG | BODY MASS INDEX: 29.82 KG/M2 | HEART RATE: 59 BPM | OXYGEN SATURATION: 95 % | SYSTOLIC BLOOD PRESSURE: 122 MMHG | TEMPERATURE: 98 F | HEIGHT: 67 IN | RESPIRATION RATE: 18 BRPM | WEIGHT: 190 LBS

## 2021-07-21 DIAGNOSIS — J06.9 VIRAL URI: ICD-10-CM

## 2021-07-21 DIAGNOSIS — R05.9 COUGH: Primary | ICD-10-CM

## 2021-07-21 LAB
CTP QC/QA: YES
SARS-COV-2 RDRP RESP QL NAA+PROBE: NEGATIVE

## 2021-07-21 PROCEDURE — U0002: ICD-10-PCS | Mod: QW,CR,S$GLB, | Performed by: PHYSICIAN ASSISTANT

## 2021-07-21 PROCEDURE — U0002 COVID-19 LAB TEST NON-CDC: HCPCS | Mod: QW,CR,S$GLB, | Performed by: PHYSICIAN ASSISTANT

## 2021-07-21 PROCEDURE — 99214 OFFICE O/P EST MOD 30 MIN: CPT | Mod: 25,S$GLB,, | Performed by: PHYSICIAN ASSISTANT

## 2021-07-21 PROCEDURE — 96372 PR INJECTION,THERAP/PROPH/DIAG2ST, IM OR SUBCUT: ICD-10-PCS | Mod: S$GLB,,, | Performed by: PHYSICIAN ASSISTANT

## 2021-07-21 PROCEDURE — 96372 THER/PROPH/DIAG INJ SC/IM: CPT | Mod: S$GLB,,, | Performed by: PHYSICIAN ASSISTANT

## 2021-07-21 PROCEDURE — 99214 PR OFFICE/OUTPT VISIT, EST, LEVL IV, 30-39 MIN: ICD-10-PCS | Mod: 25,S$GLB,, | Performed by: PHYSICIAN ASSISTANT

## 2021-07-21 RX ORDER — ALBUTEROL SULFATE 90 UG/1
2 AEROSOL, METERED RESPIRATORY (INHALATION) EVERY 4 HOURS PRN
Qty: 18 G | Refills: 0 | Status: SHIPPED | OUTPATIENT
Start: 2021-07-21

## 2021-07-21 RX ORDER — DEXAMETHASONE SODIUM PHOSPHATE 100 MG/10ML
8 INJECTION INTRAMUSCULAR; INTRAVENOUS
Status: COMPLETED | OUTPATIENT
Start: 2021-07-21 | End: 2021-07-21

## 2021-07-21 RX ORDER — BENZONATATE 100 MG/1
200 CAPSULE ORAL 3 TIMES DAILY PRN
Qty: 30 CAPSULE | Refills: 0 | Status: SHIPPED | OUTPATIENT
Start: 2021-07-21 | End: 2021-07-31

## 2021-07-21 RX ADMIN — DEXAMETHASONE SODIUM PHOSPHATE 8 MG: 100 INJECTION INTRAMUSCULAR; INTRAVENOUS at 11:07

## 2021-08-11 ENCOUNTER — TELEPHONE (OUTPATIENT)
Dept: DERMATOLOGY | Facility: CLINIC | Age: 71
End: 2021-08-11

## 2021-09-27 ENCOUNTER — PATIENT MESSAGE (OUTPATIENT)
Dept: FAMILY MEDICINE | Facility: CLINIC | Age: 71
End: 2021-09-27

## 2021-10-06 ENCOUNTER — OFFICE VISIT (OUTPATIENT)
Dept: DERMATOLOGY | Facility: CLINIC | Age: 71
End: 2021-10-06
Payer: MEDICARE

## 2021-10-06 VITALS — WEIGHT: 190.06 LBS | BODY MASS INDEX: 29.83 KG/M2 | HEIGHT: 67 IN | RESPIRATION RATE: 18 BRPM

## 2021-10-06 DIAGNOSIS — N90.4 LICHEN SCLEROSUS OF FEMALE GENITALIA: ICD-10-CM

## 2021-10-06 DIAGNOSIS — L82.1 SEBORRHEIC KERATOSES: Primary | ICD-10-CM

## 2021-10-06 DIAGNOSIS — Z85.828 PERSONAL HISTORY OF MALIGNANT NEOPLASM OF SKIN: ICD-10-CM

## 2021-10-06 DIAGNOSIS — L73.9 FOLLICULITIS: ICD-10-CM

## 2021-10-06 DIAGNOSIS — D23.9 ANGIOKERATOMA: ICD-10-CM

## 2021-10-06 DIAGNOSIS — L91.0 HYPERTROPHIC SCAR: ICD-10-CM

## 2021-10-06 PROCEDURE — 11900 PR INJECTION INTO SKIN LESIONS, UP TO 7: ICD-10-PCS | Mod: S$PBB,,, | Performed by: DERMATOLOGY

## 2021-10-06 PROCEDURE — 99999 PR PBB SHADOW E&M-EST. PATIENT-LVL III: ICD-10-PCS | Mod: PBBFAC,,, | Performed by: DERMATOLOGY

## 2021-10-06 PROCEDURE — 11900 INJECT SKIN LESIONS </W 7: CPT | Mod: S$PBB,,, | Performed by: DERMATOLOGY

## 2021-10-06 PROCEDURE — 99214 PR OFFICE/OUTPT VISIT, EST, LEVL IV, 30-39 MIN: ICD-10-PCS | Mod: 25,S$PBB,, | Performed by: DERMATOLOGY

## 2021-10-06 PROCEDURE — 99213 OFFICE O/P EST LOW 20 MIN: CPT | Mod: PBBFAC,PO | Performed by: DERMATOLOGY

## 2021-10-06 PROCEDURE — 11900 INJECT SKIN LESIONS </W 7: CPT | Mod: PBBFAC,PO | Performed by: DERMATOLOGY

## 2021-10-06 PROCEDURE — 99999 PR PBB SHADOW E&M-EST. PATIENT-LVL III: CPT | Mod: PBBFAC,,, | Performed by: DERMATOLOGY

## 2021-10-06 PROCEDURE — 99214 OFFICE O/P EST MOD 30 MIN: CPT | Mod: 25,S$PBB,, | Performed by: DERMATOLOGY

## 2021-10-06 RX ORDER — CLINDAMYCIN PHOSPHATE 10 UG/ML
LOTION TOPICAL 2 TIMES DAILY
Qty: 60 ML | Refills: 3 | Status: SHIPPED | OUTPATIENT
Start: 2021-10-06 | End: 2023-05-05 | Stop reason: SDUPTHER

## 2021-10-25 ENCOUNTER — TELEPHONE (OUTPATIENT)
Dept: FAMILY MEDICINE | Facility: CLINIC | Age: 71
End: 2021-10-25
Payer: MEDICARE

## 2021-11-02 ENCOUNTER — IMMUNIZATION (OUTPATIENT)
Dept: INTERNAL MEDICINE | Facility: CLINIC | Age: 71
End: 2021-11-02
Payer: MEDICARE

## 2021-11-02 DIAGNOSIS — Z23 NEED FOR VACCINATION: Primary | ICD-10-CM

## 2021-11-02 PROCEDURE — 91300 COVID-19, MRNA, LNP-S, PF, 30 MCG/0.3 ML DOSE VACCINE: CPT | Mod: PBBFAC

## 2021-12-30 ENCOUNTER — PATIENT MESSAGE (OUTPATIENT)
Dept: DERMATOLOGY | Facility: CLINIC | Age: 71
End: 2021-12-30
Payer: MEDICARE

## 2021-12-30 RX ORDER — BIMATOPROST 3 UG/ML
1 SOLUTION TOPICAL NIGHTLY
Qty: 5 ML | Refills: 12 | Status: SHIPPED | OUTPATIENT
Start: 2021-12-30 | End: 2023-11-27 | Stop reason: SDUPTHER

## 2022-01-17 ENCOUNTER — PATIENT MESSAGE (OUTPATIENT)
Dept: DERMATOLOGY | Facility: CLINIC | Age: 72
End: 2022-01-17
Payer: MEDICARE

## 2022-01-30 ENCOUNTER — PATIENT MESSAGE (OUTPATIENT)
Dept: DERMATOLOGY | Facility: CLINIC | Age: 72
End: 2022-01-30
Payer: MEDICARE

## 2022-03-23 ENCOUNTER — OFFICE VISIT (OUTPATIENT)
Dept: DERMATOLOGY | Facility: CLINIC | Age: 72
End: 2022-03-23
Payer: MEDICARE

## 2022-03-23 DIAGNOSIS — L82.0 INFLAMED SEBORRHEIC KERATOSIS: ICD-10-CM

## 2022-03-23 DIAGNOSIS — L57.0 AK (ACTINIC KERATOSIS): Primary | ICD-10-CM

## 2022-03-23 DIAGNOSIS — I78.1 TELANGIECTASIA: ICD-10-CM

## 2022-03-23 DIAGNOSIS — D18.01 CHERRY ANGIOMA: ICD-10-CM

## 2022-03-23 DIAGNOSIS — R20.9 SKIN SENSATION DISTURBANCE: ICD-10-CM

## 2022-03-23 PROCEDURE — 17110 PR DESTRUCTION BENIGN LESIONS UP TO 14: ICD-10-PCS | Mod: S$PBB,,, | Performed by: DERMATOLOGY

## 2022-03-23 PROCEDURE — 99999 PR PBB SHADOW E&M-EST. PATIENT-LVL III: ICD-10-PCS | Mod: PBBFAC,,, | Performed by: DERMATOLOGY

## 2022-03-23 PROCEDURE — 99999 PR PBB SHADOW E&M-EST. PATIENT-LVL III: CPT | Mod: PBBFAC,,, | Performed by: DERMATOLOGY

## 2022-03-23 PROCEDURE — 17003 DESTRUCTION, PREMALIGNANT LESIONS; SECOND THROUGH 14 LESIONS: ICD-10-PCS | Mod: 59,S$PBB,, | Performed by: DERMATOLOGY

## 2022-03-23 PROCEDURE — 99213 OFFICE O/P EST LOW 20 MIN: CPT | Mod: 25,S$PBB,, | Performed by: DERMATOLOGY

## 2022-03-23 PROCEDURE — 17000 PR DESTRUCTION(LASER SURGERY,CRYOSURGERY,CHEMOSURGERY),PREMALIGNANT LESIONS,FIRST LESION: ICD-10-PCS | Mod: 59,S$PBB,, | Performed by: DERMATOLOGY

## 2022-03-23 PROCEDURE — 17000 DESTRUCT PREMALG LESION: CPT | Mod: 59,PBBFAC | Performed by: DERMATOLOGY

## 2022-03-23 PROCEDURE — 99213 PR OFFICE/OUTPT VISIT, EST, LEVL III, 20-29 MIN: ICD-10-PCS | Mod: 25,S$PBB,, | Performed by: DERMATOLOGY

## 2022-03-23 PROCEDURE — 17110 DESTRUCTION B9 LES UP TO 14: CPT | Mod: PBBFAC | Performed by: DERMATOLOGY

## 2022-03-23 PROCEDURE — 17000 DESTRUCT PREMALG LESION: CPT | Mod: 59,S$PBB,, | Performed by: DERMATOLOGY

## 2022-03-23 PROCEDURE — 99213 OFFICE O/P EST LOW 20 MIN: CPT | Mod: PBBFAC | Performed by: DERMATOLOGY

## 2022-03-23 PROCEDURE — 17110 DESTRUCTION B9 LES UP TO 14: CPT | Mod: S$PBB,,, | Performed by: DERMATOLOGY

## 2022-03-23 PROCEDURE — 17003 DESTRUCT PREMALG LES 2-14: CPT | Mod: 59,PBBFAC | Performed by: DERMATOLOGY

## 2022-03-23 PROCEDURE — 17003 DESTRUCT PREMALG LES 2-14: CPT | Mod: 59,S$PBB,, | Performed by: DERMATOLOGY

## 2022-03-23 RX ORDER — CLINDAMYCIN HYDROCHLORIDE 300 MG/1
300 CAPSULE ORAL 4 TIMES DAILY
COMMUNITY
Start: 2022-02-21 | End: 2022-05-19

## 2022-03-23 NOTE — PROGRESS NOTES
Subjective:       Patient ID:  Viv De La Torre is a 72 y.o. female who presents for   Chief Complaint   Patient presents with    Skin Check     TBSE     HPI  Pt presents today for several spots of concern.   Pt would like her back and scalp checked out. Feels crusty areas in scalp. Asymptomatic and no prior treatment.  Had a BCC on R upper back treated by ED&C by Dr. Lopez in 5/2021.  Would also like a growth removed from her buttocks because she finds herself trying to pick it off in the middle of the night.  Would like to know if she has rosacea, because she saw a photo from the dentist and her face looked very red.     Review of Systems   Constitutional: Negative for fever, chills, weight loss, weight gain, fatigue, night sweats and malaise.   Skin: Positive for activity-related sunscreen use. Negative for daily sunscreen use and recent sunburn.   Hematologic/Lymphatic: Does not bruise/bleed easily.        Objective:    Physical Exam   Constitutional: She appears well-developed and well-nourished. No distress.   Neurological: She is alert and oriented to person, place, and time. She is not disoriented.   Psychiatric: She has a normal mood and affect.   Skin:   Areas Examined (abnormalities noted in diagram):   Scalp / Hair Palpated and Inspected  Head / Face Inspection Performed  Back Inspection Performed                   Diagram Legend     Erythematous scaling macule/papule c/w actinic keratosis       Vascular papule c/w angioma      Pigmented verrucoid papule/plaque c/w seborrheic keratosis      Yellow umbilicated papule c/w sebaceous hyperplasia      Irregularly shaped tan macule c/w lentigo     1-2 mm smooth white papules consistent with Milia      Movable subcutaneous cyst with punctum c/w epidermal inclusion cyst      Subcutaneous movable cyst c/w pilar cyst      Firm pink to brown papule c/w dermatofibroma      Pedunculated fleshy papule(s) c/w skin tag(s)      Evenly pigmented macule c/w junctional  nevus     Mildly variegated pigmented, slightly irregular-bordered macule c/w mildly atypical nevus      Flesh colored to evenly pigmented papule c/w intradermal nevus       Pink pearly papule/plaque c/w basal cell carcinoma      Erythematous hyperkeratotic cursted plaque c/w SCC      Surgical scar with no sign of skin cancer recurrence      Open and closed comedones      Inflammatory papules and pustules      Verrucoid papule consistent consistent with wart     Erythematous eczematous patches and plaques     Dystrophic onycholytic nail with subungual debris c/w onychomycosis     Umbilicated papule    Erythematous-base heme-crusted tan verrucoid plaque consistent with inflamed seborrheic keratosis     Erythematous Silvery Scaling Plaque c/w Psoriasis     See annotation      Assessment / Plan:        AK (actinic keratosis)  Cryosurgery Procedure Note    Verbal consent from the patient is obtained including, but not limited to, risk of hypopigmentation/hyperpigmentation, scar, recurrence of lesion. The patient is aware of the precancerous quality and need for treatment of these lesions. Liquid nitrogen cryosurgery is applied to the 2 actinic keratoses, as detailed in the physical exam, to produce a freeze injury. The patient is aware that blisters may form and is instructed on wound care with gentle cleansing and use of vaseline ointment to keep moist until healed. The patient is supplied a handout on cryosurgery and is instructed to call if lesions do not completely resolve.    Cherry angioma  This is a benign vascular lesion. Reassurance given. No treatment required. Treatment of benign, asymptomatic lesions may be considered cosmetic.    Telangiectasia  Info provided about laser clinic with Dr. Nguyen. Pt declines topical tx.  Follow sunscreen recs.    Inflamed SK (seborrheic keratosis) with skin sensation disturbance  Cryosurgery procedure note:    Verbal consent from the patient is obtained including, but not  limited to, risk of hypopigmentation/hyperpigmentation, scar, recurrence of lesion. Liquid nitrogen cryosurgery is applied to 1 lesion to produce a freeze injury. The patient is aware that blisters may form and is instructed on wound care with gentle cleansing and use of vaseline ointment to keep moist until healed. The patient is supplied a handout on cryosurgery and is instructed to call if lesions do not completely resolve.      Follow up in about 6 months (around 9/23/2022) for skin check or sooner for any concerns.

## 2022-03-28 ENCOUNTER — OFFICE VISIT (OUTPATIENT)
Dept: OPTOMETRY | Facility: CLINIC | Age: 72
End: 2022-03-28
Payer: MEDICARE

## 2022-03-28 DIAGNOSIS — H04.123 DRY EYE SYNDROME OF BOTH EYES: ICD-10-CM

## 2022-03-28 DIAGNOSIS — H26.493 AFTER-CATARACT OBSCURING VISION, BILATERAL: Primary | ICD-10-CM

## 2022-03-28 DIAGNOSIS — Z96.1 PSEUDOPHAKIA OF BOTH EYES: ICD-10-CM

## 2022-03-28 DIAGNOSIS — D31.31 NEVUS OF CHOROID OF RIGHT EYE: ICD-10-CM

## 2022-03-28 PROCEDURE — 92014 PR EYE EXAM, EST PATIENT,COMPREHESV: ICD-10-PCS | Mod: S$PBB,,, | Performed by: OPTOMETRIST

## 2022-03-28 PROCEDURE — 99999 PR PBB SHADOW E&M-EST. PATIENT-LVL III: CPT | Mod: PBBFAC,,, | Performed by: OPTOMETRIST

## 2022-03-28 PROCEDURE — 92014 COMPRE OPH EXAM EST PT 1/>: CPT | Mod: S$PBB,,, | Performed by: OPTOMETRIST

## 2022-03-28 PROCEDURE — 99999 PR PBB SHADOW E&M-EST. PATIENT-LVL III: ICD-10-PCS | Mod: PBBFAC,,, | Performed by: OPTOMETRIST

## 2022-03-28 PROCEDURE — 99213 OFFICE O/P EST LOW 20 MIN: CPT | Mod: PBBFAC | Performed by: OPTOMETRIST

## 2022-03-28 NOTE — PROGRESS NOTES
HPI     Last eye exam was approximately 1 year ago.  Patient states decrease in distance vision since last exam. Was prescribed   glasses at last exam and wasn't happy with them. Feels that her vision was   to be perfect after having cataract sx and feels the surgery failed. Would   be interested in glasses if it improved her vision.  Patient denies diplopia, headaches, flashes/floaters, and pain.        Last edited by Smitha Victoria MA on 3/28/2022  3:40 PM. (History)            Assessment /Plan     For exam results, see Encounter Report.    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  Patient has noticed a decrease in distance vision since surgery in 2/2020.  Mild PCO OU.  Refer for YAG OU.  Will see a week after for vision check.  If still unhappy with vision will prescribe glasses.  3.  Longstanding-stable.  Retina flat and intact OU--no holes, tears, breaks, or RDs.  Eye health normal OU.  4.  Recommend using artificial tears at least 2x/day OU.

## 2022-03-29 PROBLEM — D31.31 NEVUS OF CHOROID OF RIGHT EYE: Status: ACTIVE | Noted: 2022-03-29

## 2022-05-13 ENCOUNTER — OFFICE VISIT (OUTPATIENT)
Dept: OPHTHALMOLOGY | Facility: CLINIC | Age: 72
End: 2022-05-13
Attending: OPHTHALMOLOGY
Payer: MEDICARE

## 2022-05-13 DIAGNOSIS — H26.493 POSTERIOR CAPSULAR OPACIFICATION, BILATERAL: ICD-10-CM

## 2022-05-13 DIAGNOSIS — H26.493 POSTERIOR CAPSULAR OPACIFICATION VISUALLY SIGNIFICANT OF BOTH EYES: Primary | ICD-10-CM

## 2022-05-13 PROCEDURE — 66821 AFTER CATARACT LASER SURGERY: CPT | Mod: 50,PBBFAC | Performed by: OPHTHALMOLOGY

## 2022-05-13 PROCEDURE — 66821 PR DISCISSION,2ND CATARACT,LASER: ICD-10-PCS | Mod: 50,S$PBB,, | Performed by: OPHTHALMOLOGY

## 2022-05-13 PROCEDURE — 99214 OFFICE O/P EST MOD 30 MIN: CPT | Mod: 57,S$PBB,, | Performed by: OPHTHALMOLOGY

## 2022-05-13 PROCEDURE — 99214 PR OFFICE/OUTPT VISIT, EST, LEVL IV, 30-39 MIN: ICD-10-PCS | Mod: 57,S$PBB,, | Performed by: OPHTHALMOLOGY

## 2022-05-13 PROCEDURE — 99999 PR PBB SHADOW E&M-EST. PATIENT-LVL III: ICD-10-PCS | Mod: PBBFAC,,, | Performed by: OPHTHALMOLOGY

## 2022-05-13 PROCEDURE — 66821 AFTER CATARACT LASER SURGERY: CPT | Mod: 50,S$PBB,, | Performed by: OPHTHALMOLOGY

## 2022-05-13 PROCEDURE — 99999 PR PBB SHADOW E&M-EST. PATIENT-LVL III: CPT | Mod: PBBFAC,,, | Performed by: OPHTHALMOLOGY

## 2022-05-13 PROCEDURE — 99213 OFFICE O/P EST LOW 20 MIN: CPT | Mod: PBBFAC,25 | Performed by: OPHTHALMOLOGY

## 2022-05-13 NOTE — PROGRESS NOTES
HPI     Patient presents today for a PCO Evaluation. Patient notes vision as   blurry. Patient notes difficulty with glare. Patient notes no eye pain.      Last edited by Omari Tracey on 5/13/2022  3:54 PM. (History)            Assessment /Plan     For exam results, see Encounter Report.    Posterior capsular opacification visually significant of both eyes    Posterior capsular opacification, bilateral      Visually significant posterior capsular opacity present.  Discussed risks, benefits, and alternatives to laser surgery.  YAG laser capsulotomy Procedure Note:   Informed consent obtained and correct eye(s) verified with patient.  1 drop of topical Proparacaine and Iopidine instilled, and eye(s) dilated with 1% Tropicamide 2.5% Phenylephrine.  YAG laser applied to posterior capsule in cruciate pattern OU  Patient tolerated procedure well. No complications. Follow up in 1 month/PRN.

## 2022-05-19 ENCOUNTER — OFFICE VISIT (OUTPATIENT)
Dept: OPTOMETRY | Facility: CLINIC | Age: 72
End: 2022-05-19
Payer: MEDICARE

## 2022-05-19 DIAGNOSIS — Z98.890 S/P YAG CAPSULOTOMY, BILATERAL: Primary | ICD-10-CM

## 2022-05-19 PROCEDURE — 99213 OFFICE O/P EST LOW 20 MIN: CPT | Mod: PBBFAC | Performed by: OPTOMETRIST

## 2022-05-19 PROCEDURE — 99999 PR PBB SHADOW E&M-EST. PATIENT-LVL III: ICD-10-PCS | Mod: PBBFAC,,, | Performed by: OPTOMETRIST

## 2022-05-19 PROCEDURE — 99999 PR PBB SHADOW E&M-EST. PATIENT-LVL III: CPT | Mod: PBBFAC,,, | Performed by: OPTOMETRIST

## 2022-05-19 PROCEDURE — 99024 PR POST-OP FOLLOW-UP VISIT: ICD-10-PCS | Mod: POP,,, | Performed by: OPTOMETRIST

## 2022-05-19 PROCEDURE — 99024 POSTOP FOLLOW-UP VISIT: CPT | Mod: POP,,, | Performed by: OPTOMETRIST

## 2022-05-19 RX ORDER — ESTRADIOL 0.1 MG/G
1 CREAM VAGINAL
COMMUNITY
Start: 2022-04-17

## 2022-05-19 NOTE — PROGRESS NOTES
HPI     Blurred Vision      Additional comments: MR ck              Comments     Last eye exam was 5/13/22 with Dr. Acosta.  Patient states unsure if distance vision has improved since yag. Near   vision was better the day of the yag but was blurry the next day.          Last edited by Smitha Victoria MA on 5/19/2022  2:19 PM. (History)            Assessment /Plan     For exam results, see Encounter Report.    S/P YAG capsulotomy, bilateral            1.  Pt doing well.  Has noticed an improvement to vision.  Hold off on glasses for now.    RTC 1 year for routine exam.

## 2022-08-05 ENCOUNTER — IMMUNIZATION (OUTPATIENT)
Dept: INTERNAL MEDICINE | Facility: CLINIC | Age: 72
End: 2022-08-05
Payer: MEDICARE

## 2022-08-05 DIAGNOSIS — Z23 NEED FOR VACCINATION: Primary | ICD-10-CM

## 2022-08-05 PROCEDURE — 0054A COVID-19, MRNA, LNP-S, PF, 30 MCG/0.3 ML DOSE VACCINE (PFIZER): CPT | Mod: PBBFAC,CV19

## 2022-08-23 ENCOUNTER — HOSPITAL ENCOUNTER (OUTPATIENT)
Dept: RADIOLOGY | Facility: HOSPITAL | Age: 72
Discharge: HOME | End: 2022-08-23
Attending: OBSTETRICS & GYNECOLOGY
Payer: MEDICARE

## 2022-08-23 DIAGNOSIS — R92.8 ABNORMAL MAMMOGRAM: ICD-10-CM

## 2022-08-23 PROCEDURE — 76642 ULTRASOUND BREAST LIMITED: CPT | Mod: 26,LT,, | Performed by: RADIOLOGY

## 2022-08-23 PROCEDURE — 77062 BREAST TOMOSYNTHESIS BI: CPT | Mod: 26,,, | Performed by: RADIOLOGY

## 2022-08-23 PROCEDURE — 77066 MAMMO DIGITAL DIAGNOSTIC BILAT WITH TOMO: ICD-10-PCS | Mod: 26,,, | Performed by: RADIOLOGY

## 2022-08-23 PROCEDURE — 77066 DX MAMMO INCL CAD BI: CPT | Mod: 26,,, | Performed by: RADIOLOGY

## 2022-08-23 PROCEDURE — 77062 MAMMO DIGITAL DIAGNOSTIC BILAT WITH TOMO: ICD-10-PCS | Mod: 26,,, | Performed by: RADIOLOGY

## 2022-08-23 PROCEDURE — 76642 ULTRASOUND BREAST LIMITED: CPT | Mod: TC,LT

## 2022-08-23 PROCEDURE — 77066 DX MAMMO INCL CAD BI: CPT | Mod: TC

## 2022-08-23 PROCEDURE — 76642 US BREAST LEFT LIMITED: ICD-10-PCS | Mod: 26,LT,, | Performed by: RADIOLOGY

## 2022-08-25 ENCOUNTER — PATIENT MESSAGE (OUTPATIENT)
Dept: FAMILY MEDICINE | Facility: CLINIC | Age: 72
End: 2022-08-25
Payer: MEDICARE

## 2022-08-25 DIAGNOSIS — E78.00 HYPERCHOLESTEROLEMIA: ICD-10-CM

## 2022-08-25 RX ORDER — ROSUVASTATIN CALCIUM 10 MG/1
10 TABLET, COATED ORAL NIGHTLY
Qty: 90 TABLET | Refills: 3 | Status: SHIPPED | OUTPATIENT
Start: 2022-08-25 | End: 2023-09-21 | Stop reason: SDUPTHER

## 2022-08-26 ENCOUNTER — PATIENT MESSAGE (OUTPATIENT)
Dept: FAMILY MEDICINE | Facility: CLINIC | Age: 72
End: 2022-08-26
Payer: MEDICARE

## 2022-08-26 DIAGNOSIS — E78.00 HYPERCHOLESTEROLEMIA: Primary | ICD-10-CM

## 2022-08-26 DIAGNOSIS — E03.4 HYPOTHYROIDISM DUE TO ACQUIRED ATROPHY OF THYROID: ICD-10-CM

## 2022-08-30 ENCOUNTER — PATIENT MESSAGE (OUTPATIENT)
Dept: FAMILY MEDICINE | Facility: CLINIC | Age: 72
End: 2022-08-30
Payer: MEDICARE

## 2022-10-12 ENCOUNTER — PATIENT MESSAGE (OUTPATIENT)
Dept: FAMILY MEDICINE | Facility: CLINIC | Age: 72
End: 2022-10-12
Payer: MEDICARE

## 2022-10-26 ENCOUNTER — LAB VISIT (OUTPATIENT)
Dept: LAB | Facility: HOSPITAL | Age: 72
End: 2022-10-26
Attending: INTERNAL MEDICINE
Payer: MEDICARE

## 2022-10-26 ENCOUNTER — OFFICE VISIT (OUTPATIENT)
Dept: FAMILY MEDICINE | Facility: CLINIC | Age: 72
End: 2022-10-26
Payer: MEDICARE

## 2022-10-26 VITALS
DIASTOLIC BLOOD PRESSURE: 62 MMHG | OXYGEN SATURATION: 96 % | SYSTOLIC BLOOD PRESSURE: 126 MMHG | HEIGHT: 67 IN | HEART RATE: 67 BPM | BODY MASS INDEX: 30 KG/M2 | WEIGHT: 191.13 LBS

## 2022-10-26 DIAGNOSIS — E03.4 HYPOTHYROIDISM DUE TO ACQUIRED ATROPHY OF THYROID: ICD-10-CM

## 2022-10-26 DIAGNOSIS — R79.89 ELEVATED LFTS: ICD-10-CM

## 2022-10-26 DIAGNOSIS — I47.10 SVT (SUPRAVENTRICULAR TACHYCARDIA): ICD-10-CM

## 2022-10-26 DIAGNOSIS — Z00.00 PHYSICAL EXAM, ROUTINE: Primary | ICD-10-CM

## 2022-10-26 DIAGNOSIS — E78.00 HYPERCHOLESTEROLEMIA: ICD-10-CM

## 2022-10-26 PROBLEM — Z12.11 SCREENING FOR COLON CANCER: Status: RESOLVED | Noted: 2021-05-06 | Resolved: 2022-10-26

## 2022-10-26 LAB
ALBUMIN SERPL BCP-MCNC: 4.2 G/DL (ref 3.5–5.2)
ALP SERPL-CCNC: 77 U/L (ref 55–135)
ALT SERPL W/O P-5'-P-CCNC: 22 U/L (ref 10–44)
ANION GAP SERPL CALC-SCNC: 12 MMOL/L (ref 8–16)
AST SERPL-CCNC: 26 U/L (ref 10–40)
BILIRUB SERPL-MCNC: 0.6 MG/DL (ref 0.1–1)
BUN SERPL-MCNC: 16 MG/DL (ref 8–23)
CALCIUM SERPL-MCNC: 10.2 MG/DL (ref 8.7–10.5)
CHLORIDE SERPL-SCNC: 106 MMOL/L (ref 95–110)
CHOLEST SERPL-MCNC: 185 MG/DL (ref 120–199)
CHOLEST/HDLC SERPL: 3.2 {RATIO} (ref 2–5)
CO2 SERPL-SCNC: 22 MMOL/L (ref 23–29)
CREAT SERPL-MCNC: 0.9 MG/DL (ref 0.5–1.4)
EST. GFR  (NO RACE VARIABLE): >60 ML/MIN/1.73 M^2
GLUCOSE SERPL-MCNC: 90 MG/DL (ref 70–110)
HDLC SERPL-MCNC: 57 MG/DL (ref 40–75)
HDLC SERPL: 30.8 % (ref 20–50)
LDLC SERPL CALC-MCNC: 97.2 MG/DL (ref 63–159)
NONHDLC SERPL-MCNC: 128 MG/DL
POTASSIUM SERPL-SCNC: 4.5 MMOL/L (ref 3.5–5.1)
PROT SERPL-MCNC: 7.9 G/DL (ref 6–8.4)
SODIUM SERPL-SCNC: 140 MMOL/L (ref 136–145)
TRIGL SERPL-MCNC: 154 MG/DL (ref 30–150)
TSH SERPL DL<=0.005 MIU/L-ACNC: 3.1 UIU/ML (ref 0.4–4)

## 2022-10-26 PROCEDURE — 80053 COMPREHEN METABOLIC PANEL: CPT | Performed by: INTERNAL MEDICINE

## 2022-10-26 PROCEDURE — 36415 COLL VENOUS BLD VENIPUNCTURE: CPT | Mod: PO | Performed by: INTERNAL MEDICINE

## 2022-10-26 PROCEDURE — 99214 PR OFFICE/OUTPT VISIT, EST, LEVL IV, 30-39 MIN: ICD-10-PCS | Mod: S$PBB,,, | Performed by: INTERNAL MEDICINE

## 2022-10-26 PROCEDURE — 80061 LIPID PANEL: CPT | Performed by: INTERNAL MEDICINE

## 2022-10-26 PROCEDURE — 99999 PR PBB SHADOW E&M-EST. PATIENT-LVL III: ICD-10-PCS | Mod: PBBFAC,,, | Performed by: INTERNAL MEDICINE

## 2022-10-26 PROCEDURE — G0008 ADMIN INFLUENZA VIRUS VAC: HCPCS | Mod: PBBFAC,PO

## 2022-10-26 PROCEDURE — 85027 COMPLETE CBC AUTOMATED: CPT | Performed by: INTERNAL MEDICINE

## 2022-10-26 PROCEDURE — 84443 ASSAY THYROID STIM HORMONE: CPT | Performed by: INTERNAL MEDICINE

## 2022-10-26 PROCEDURE — 99999 PR PBB SHADOW E&M-EST. PATIENT-LVL III: CPT | Mod: PBBFAC,,, | Performed by: INTERNAL MEDICINE

## 2022-10-26 PROCEDURE — 99214 OFFICE O/P EST MOD 30 MIN: CPT | Mod: S$PBB,,, | Performed by: INTERNAL MEDICINE

## 2022-10-26 PROCEDURE — 99213 OFFICE O/P EST LOW 20 MIN: CPT | Mod: PBBFAC,PO,25 | Performed by: INTERNAL MEDICINE

## 2022-10-26 RX ORDER — PHENTERMINE HYDROCHLORIDE 37.5 MG/1
37.5 TABLET ORAL DAILY
COMMUNITY
Start: 2022-08-01 | End: 2023-04-12

## 2022-10-26 NOTE — PROGRESS NOTES
Subjective     Viv De La Torre is a 72 y.o. old, female here for a health maintenance visit.    71 y/o with PMH of HLD, SVT/PVC's, hypothyroidism, depression  Patient has no specific concerns  HLD: Due for labs, on statin therapy.  H/o SVT/PVC's: currently asymptomatic, looks like an outside provider prescribes her phentermine.  Hypothyroidism: on euthyrox, will recheck labs.  No recent issues with depression.    History     Past Medical History:   Diagnosis Date    AK (actinic keratosis)     Allergy     Seasonal    Anxiety     Arthritis of both knees 04/22/2015    Basal cell carcinoma     Basal cell carcinoma 10/06/2021    Right upper back    Cataract     Depression     Dizziness     Fibrocystic breast     Fracture of lateral malleolus     High cholesterol     Hypothyroidism due to acquired atrophy of thyroid     Nuclear sclerosis - Both Eyes 05/06/2013    Osteoarthritis 2012    [facility]    SVT (supraventricular tachycardia)      Past Surgical History:   Procedure Laterality Date    ANKLE FRACTURE SURGERY  2013    Taiwo Macias MD    BREAST BIOPSY Left     core bx, b9    CATARACT EXTRACTION W/  INTRAOCULAR LENS IMPLANT Right 12/19/2019    Procedure: EXTRACTION, CATARACT, WITH IOL INSERTION;  Surgeon: Aleyda Acosta MD;  Location: [facility] OR;  Service: Ophthalmology;  Laterality: Right;  LASER ASSISTED    CATARACT EXTRACTION W/  INTRAOCULAR LENS IMPLANT Left 1/16/2020    Procedure: EXTRACTION, CATARACT, WITH IOL INSERTION;  Surgeon: Aleyda Acosta MD;  Location: [facility] OR;  Service: Ophthalmology;  Laterality: Left;    COLONOSCOPY  08/21/03    COLONOSCOPY N/A 5/6/2021    Procedure: COLONOSCOPY;  Surgeon: Lon Chapin MD;  Location: [facility] ENDO;  Service: Endoscopy;  Laterality: N/A;    ELBOW FRACTURE SURGERY  2004    Lang Daniels MD    ELBOW SURGERY  2004    Lang Daniels MD    EYE SURGERY  Cataract    FRACTURE SURGERY  Left ankle/ leg- left elbow/arm    fx arm      HUMERUS FRACTURE SURGERY  2004    Lang Daniels MD     HYSTERECTOMY       Review of patient's allergies indicates:   Allergen Reactions    Penicillins Other (See Comments)     Other reaction(s): Unknown     Outpatient Medications Marked as Taking for the 10/26/22 encounter (Office Visit) with Aravind Jimenez MD   Medication Sig Dispense Refill    albuterol (PROAIR HFA) 90 mcg/actuation inhaler Inhale 2 puffs into the lungs every 6 (six) hours as needed for Wheezing. 3 Inhaler 4    albuterol (PROVENTIL/VENTOLIN HFA) 90 mcg/actuation inhaler Inhale 2 puffs into the lungs every 4 (four) hours as needed for Wheezing or Shortness of Breath (cough). Rescue 18 g 0    azelastine (ASTELIN) 137 mcg (0.1 %) nasal spray 2 sprays (274 mcg total) by Nasal route 2 (two) times daily. 30 mL 6    bimatoprost (LATISSE) 0.03 % ophthalmic solution Place 1 application into both eyes every evening. Place one drop on applicator and apply evenly along the skin of the upper eyelid at base of eyelashes once daily at bedtime; repeat procedure for second eye (use a clean applicator). 5 mL 12    clindamycin (CLEOCIN T) 1 % lotion Apply topically 2 (two) times daily. 60 mL 2    clindamycin (CLEOCIN T) 1 % lotion Apply topically 2 (two) times daily. 60 mL 3    clobetasol (TEMOVATE) 0.05 % cream APPLY  CREAM TOPICALLY TO AFFECTED AREA TWICE DAILY 60 g 3    estradioL (ESTRACE) 0.01 % (0.1 mg/gram) vaginal cream Place 1 g vaginally every 7 days.      estradiol (ESTRACE) 2 MG tablet TAKE 1 TABLET BY MOUTH ONCE DAILY (Patient taking differently: Take 2 mg by mouth once daily.) 90 tablet 3    levothyroxine (EUTHYROX) 25 MCG tablet Take 1 tablet (25 mcg total) by mouth once daily. 90 tablet 3    phentermine (ADIPEX-P) 37.5 mg tablet Take 37.5 mg by mouth once daily.      rosuvastatin (CRESTOR) 10 MG tablet Take 1 tablet (10 mg total) by mouth nightly. 90 tablet 3    sertraline (ZOLOFT) 100 MG tablet Take 1 tablet (100 mg total) by mouth once daily. 1 Tablet Oral Every day 90 tablet 3    [DISCONTINUED]  "atenoloL (TENORMIN) 50 MG tablet Take 1 tablet (50 mg total) by mouth 2 (two) times daily. 180 tablet 3     Social History     Tobacco Use    Smoking status: Never    Smokeless tobacco: Never   Substance Use Topics    Alcohol use: Yes     Alcohol/week: 7.0 - 8.0 standard drinks     Types: 4 - 5 Glasses of wine, 3 Cans of beer per week     Comment: Not every week Not every day    Drug use: No     Family History   Problem Relation Age of Onset    Cancer Mother         lung cancer    Cancer Father         renal cancer    Heart disease Sister     Hypertension Sister     Heart disease Brother     Hypertension Brother     Cataracts Maternal Grandmother     Cancer Sister     Melanoma Neg Hx     Skin cancer Neg Hx     Amblyopia Neg Hx     Blindness Neg Hx     Diabetes Neg Hx     Glaucoma Neg Hx     Macular degeneration Neg Hx     Retinal detachment Neg Hx     Strabismus Neg Hx     Stroke Neg Hx     Thyroid disease Neg Hx        Review of Systems   Review of Systems   Constitutional:  Negative for chills and fever.   HENT:  Negative for ear pain and sinus pain.    Eyes:  Negative for blurred vision and pain.   Respiratory:  Negative for cough and shortness of breath.    Cardiovascular:  Negative for chest pain and palpitations.   Gastrointestinal:  Negative for abdominal pain and nausea.   Genitourinary:  Negative for dysuria and frequency.   Musculoskeletal:  Negative for joint pain and myalgias.   Skin:  Negative for itching and rash.   Neurological:  Negative for weakness and headaches.   Endo/Heme/Allergies:  Negative for environmental allergies. Does not bruise/bleed easily.   Psychiatric/Behavioral:  Negative for depression. The patient does not have insomnia.    Objective   /62   Pulse 67   Ht 5' 7" (1.702 m)   Wt 86.7 kg (191 lb 2.2 oz)   SpO2 96%   BMI 29.94 kg/m²   Physical Exam  Constitutional:       General: She is not in acute distress.     Appearance: Normal appearance. She is well-developed.   HENT: "      Head: Normocephalic and atraumatic.   Eyes:      Conjunctiva/sclera: Conjunctivae normal.      Pupils: Pupils are equal, round, and reactive to light.   Neck:      Thyroid: No thyroid mass or thyromegaly.   Cardiovascular:      Rate and Rhythm: Normal rate and regular rhythm.      Heart sounds: Normal heart sounds. No murmur heard.  Pulmonary:      Effort: No respiratory distress.      Breath sounds: Normal breath sounds.   Abdominal:      General: Bowel sounds are normal.      Palpations: Abdomen is soft.      Tenderness: There is no abdominal tenderness.   Musculoskeletal:         General: No deformity.      Cervical back: Neck supple.   Lymphadenopathy:      Cervical: No cervical adenopathy.      Upper Body:      Right upper body: No supraclavicular adenopathy.      Left upper body: No supraclavicular adenopathy.   Skin:     General: Skin is warm and dry.      Findings: No rash.   Neurological:      Mental Status: She is alert and oriented to person, place, and time.   Psychiatric:         Behavior: Behavior normal.     Assessment and Plan     Physical exam, routine    Hypothyroidism due to acquired atrophy of thyroid  -     TSH; Future; Expected date: 10/26/2022    Hypercholesterolemia  -     Comprehensive Metabolic Panel; Future; Expected date: 10/26/2022  -     Lipid Panel; Future; Expected date: 10/26/2022    Elevated LFTs  -     CBC Without Differential; Future; Expected date: 10/26/2022    SVT (supraventricular tachycardia)    Other orders  -     diphth,pertus,acell,,tetanus (BOOSTRIX) 2.5-8-5 Lf-mcg-Lf/0.5mL Susp; Inject 0.5 mLs into the muscle once. for 1 dose  Dispense: 0.5 mL; Refill: 0  -     Influenza - Quadrivalent (Adjuvanted)      No follow-ups on file.    ___________________  Aravind Jimenez MD  Internal Medicine and Pediatrics

## 2022-10-27 ENCOUNTER — PATIENT MESSAGE (OUTPATIENT)
Dept: FAMILY MEDICINE | Facility: CLINIC | Age: 72
End: 2022-10-27
Payer: MEDICARE

## 2022-10-27 LAB
ERYTHROCYTE [DISTWIDTH] IN BLOOD BY AUTOMATED COUNT: 12.3 % (ref 11.5–14.5)
HCT VFR BLD AUTO: 41 % (ref 37–48.5)
HGB BLD-MCNC: 14.2 G/DL (ref 12–16)
MCH RBC QN AUTO: 31.8 PG (ref 27–31)
MCHC RBC AUTO-ENTMCNC: 34.6 G/DL (ref 32–36)
MCV RBC AUTO: 92 FL (ref 82–98)
PLATELET # BLD AUTO: 376 K/UL (ref 150–450)
PMV BLD AUTO: 10.9 FL (ref 9.2–12.9)
RBC # BLD AUTO: 4.46 M/UL (ref 4–5.4)
WBC # BLD AUTO: 8.4 K/UL (ref 3.9–12.7)

## 2022-11-09 ENCOUNTER — IMMUNIZATION (OUTPATIENT)
Dept: INTERNAL MEDICINE | Facility: CLINIC | Age: 72
End: 2022-11-09
Payer: MEDICARE

## 2022-11-09 DIAGNOSIS — Z23 NEED FOR VACCINATION: Primary | ICD-10-CM

## 2022-11-09 PROCEDURE — 0124A COVID-19, MRNA, LNP-S, BIVALENT BOOSTER, PF, 30 MCG/0.3 ML DOSE: CPT | Mod: PBBFAC,CV19

## 2022-11-09 PROCEDURE — 91312 COVID-19, MRNA, LNP-S, BIVALENT BOOSTER, PF, 30 MCG/0.3 ML DOSE: CPT | Mod: PBBFAC

## 2023-01-17 ENCOUNTER — PATIENT MESSAGE (OUTPATIENT)
Dept: FAMILY MEDICINE | Facility: CLINIC | Age: 73
End: 2023-01-17
Payer: MEDICARE

## 2023-01-17 DIAGNOSIS — E03.4 HYPOTHYROIDISM DUE TO ACQUIRED ATROPHY OF THYROID: ICD-10-CM

## 2023-01-17 RX ORDER — LEVOTHYROXINE SODIUM 25 UG/1
25 TABLET ORAL DAILY
Qty: 90 TABLET | Refills: 3 | Status: CANCELLED | OUTPATIENT
Start: 2023-01-17

## 2023-01-19 DIAGNOSIS — E03.4 HYPOTHYROIDISM DUE TO ACQUIRED ATROPHY OF THYROID: ICD-10-CM

## 2023-01-19 RX ORDER — LEVOTHYROXINE SODIUM 25 UG/1
25 TABLET ORAL DAILY
Qty: 90 TABLET | Refills: 3 | Status: CANCELLED | OUTPATIENT
Start: 2023-01-19

## 2023-01-20 RX ORDER — LEVOTHYROXINE SODIUM 25 UG/1
25 TABLET ORAL DAILY
Qty: 90 TABLET | Refills: 2 | Status: SHIPPED | OUTPATIENT
Start: 2023-01-20 | End: 2023-11-25 | Stop reason: SDUPTHER

## 2023-01-20 NOTE — TELEPHONE ENCOUNTER
No new care gaps identified.  [facility] Embedded Care Gaps. Reference number: [id number]. 1/20/2023   9:52:18 AM CST

## 2023-01-20 NOTE — TELEPHONE ENCOUNTER
Patient [place]ill [place]. Medication pended with correct pharmacy . Please advise

## 2023-03-21 ENCOUNTER — PATIENT MESSAGE (OUTPATIENT)
Dept: FAMILY MEDICINE | Facility: CLINIC | Age: 73
End: 2023-03-21
Payer: MEDICARE

## 2023-04-01 ENCOUNTER — PATIENT MESSAGE (OUTPATIENT)
Dept: FAMILY MEDICINE | Facility: CLINIC | Age: 73
End: 2023-04-01
Payer: MEDICARE

## 2023-04-10 ENCOUNTER — PATIENT MESSAGE (OUTPATIENT)
Dept: FAMILY MEDICINE | Facility: CLINIC | Age: 73
End: 2023-04-10
Payer: MEDICARE

## 2023-04-12 ENCOUNTER — OFFICE VISIT (OUTPATIENT)
Dept: OPTOMETRY | Facility: CLINIC | Age: 73
End: 2023-04-12
Payer: MEDICARE

## 2023-04-12 ENCOUNTER — LAB VISIT (OUTPATIENT)
Dept: LAB | Facility: HOSPITAL | Age: 73
End: 2023-04-12
Attending: INTERNAL MEDICINE
Payer: MEDICARE

## 2023-04-12 ENCOUNTER — OFFICE VISIT (OUTPATIENT)
Dept: FAMILY MEDICINE | Facility: CLINIC | Age: 73
End: 2023-04-12
Payer: MEDICARE

## 2023-04-12 VITALS
DIASTOLIC BLOOD PRESSURE: 80 MMHG | BODY MASS INDEX: 31.7 KG/M2 | OXYGEN SATURATION: 96 % | SYSTOLIC BLOOD PRESSURE: 124 MMHG | HEIGHT: 67 IN | WEIGHT: 201.94 LBS | HEART RATE: 58 BPM

## 2023-04-12 DIAGNOSIS — E03.4 HYPOTHYROIDISM DUE TO ACQUIRED ATROPHY OF THYROID: ICD-10-CM

## 2023-04-12 DIAGNOSIS — H04.123 DRY EYE SYNDROME OF BOTH EYES: ICD-10-CM

## 2023-04-12 DIAGNOSIS — Z96.1 PSEUDOPHAKIA OF BOTH EYES: ICD-10-CM

## 2023-04-12 DIAGNOSIS — R42 DIZZINESS: ICD-10-CM

## 2023-04-12 DIAGNOSIS — I47.10 SVT (SUPRAVENTRICULAR TACHYCARDIA): ICD-10-CM

## 2023-04-12 DIAGNOSIS — G25.0 ESSENTIAL TREMOR: Primary | ICD-10-CM

## 2023-04-12 DIAGNOSIS — G25.0 ESSENTIAL TREMOR: ICD-10-CM

## 2023-04-12 DIAGNOSIS — E78.00 HYPERCHOLESTEROLEMIA: ICD-10-CM

## 2023-04-12 DIAGNOSIS — Z13.5 GLAUCOMA SCREENING: ICD-10-CM

## 2023-04-12 DIAGNOSIS — D31.31 NEVUS OF CHOROID OF RIGHT EYE: ICD-10-CM

## 2023-04-12 DIAGNOSIS — R42 DIZZINESS: Primary | ICD-10-CM

## 2023-04-12 LAB
ALBUMIN SERPL BCP-MCNC: 3.7 G/DL (ref 3.5–5.2)
ALBUMIN SERPL BCP-MCNC: 3.7 G/DL (ref 3.5–5.2)
ALP SERPL-CCNC: 69 U/L (ref 55–135)
ALP SERPL-CCNC: 69 U/L (ref 55–135)
ALT SERPL W/O P-5'-P-CCNC: 18 U/L (ref 10–44)
ALT SERPL W/O P-5'-P-CCNC: 18 U/L (ref 10–44)
ANION GAP SERPL CALC-SCNC: 11 MMOL/L (ref 8–16)
ANION GAP SERPL CALC-SCNC: 11 MMOL/L (ref 8–16)
AST SERPL-CCNC: 19 U/L (ref 10–40)
AST SERPL-CCNC: 19 U/L (ref 10–40)
BASOPHILS # BLD AUTO: 0.05 K/UL (ref 0–0.2)
BASOPHILS NFR BLD: 0.9 % (ref 0–1.9)
BILIRUB SERPL-MCNC: 0.4 MG/DL (ref 0.1–1)
BILIRUB SERPL-MCNC: 0.4 MG/DL (ref 0.1–1)
BUN SERPL-MCNC: 15 MG/DL (ref 8–23)
BUN SERPL-MCNC: 15 MG/DL (ref 8–23)
CALCIUM SERPL-MCNC: 9.3 MG/DL (ref 8.7–10.5)
CALCIUM SERPL-MCNC: 9.3 MG/DL (ref 8.7–10.5)
CHLORIDE SERPL-SCNC: 105 MMOL/L (ref 95–110)
CHLORIDE SERPL-SCNC: 105 MMOL/L (ref 95–110)
CHOLEST SERPL-MCNC: 160 MG/DL (ref 120–199)
CHOLEST/HDLC SERPL: 3 {RATIO} (ref 2–5)
CO2 SERPL-SCNC: 23 MMOL/L (ref 23–29)
CO2 SERPL-SCNC: 23 MMOL/L (ref 23–29)
CREAT SERPL-MCNC: 0.8 MG/DL (ref 0.5–1.4)
CREAT SERPL-MCNC: 0.8 MG/DL (ref 0.5–1.4)
DIFFERENTIAL METHOD: ABNORMAL
EOSINOPHIL # BLD AUTO: 0.1 K/UL (ref 0–0.5)
EOSINOPHIL NFR BLD: 2 % (ref 0–8)
ERYTHROCYTE [DISTWIDTH] IN BLOOD BY AUTOMATED COUNT: 12.5 % (ref 11.5–14.5)
ERYTHROCYTE [DISTWIDTH] IN BLOOD BY AUTOMATED COUNT: 12.5 % (ref 11.5–14.5)
EST. GFR  (NO RACE VARIABLE): >60 ML/MIN/1.73 M^2
EST. GFR  (NO RACE VARIABLE): >60 ML/MIN/1.73 M^2
GLUCOSE SERPL-MCNC: 87 MG/DL (ref 70–110)
GLUCOSE SERPL-MCNC: 87 MG/DL (ref 70–110)
HCT VFR BLD AUTO: 39.2 % (ref 37–48.5)
HCT VFR BLD AUTO: 39.2 % (ref 37–48.5)
HDLC SERPL-MCNC: 54 MG/DL (ref 40–75)
HDLC SERPL: 33.8 % (ref 20–50)
HGB BLD-MCNC: 13.2 G/DL (ref 12–16)
HGB BLD-MCNC: 13.2 G/DL (ref 12–16)
IMM GRANULOCYTES # BLD AUTO: 0.01 K/UL (ref 0–0.04)
IMM GRANULOCYTES NFR BLD AUTO: 0.2 % (ref 0–0.5)
LDLC SERPL CALC-MCNC: 73 MG/DL (ref 63–159)
LYMPHOCYTES # BLD AUTO: 1.8 K/UL (ref 1–4.8)
LYMPHOCYTES NFR BLD: 33.3 % (ref 18–48)
MCH RBC QN AUTO: 31.8 PG (ref 27–31)
MCH RBC QN AUTO: 31.8 PG (ref 27–31)
MCHC RBC AUTO-ENTMCNC: 33.7 G/DL (ref 32–36)
MCHC RBC AUTO-ENTMCNC: 33.7 G/DL (ref 32–36)
MCV RBC AUTO: 95 FL (ref 82–98)
MCV RBC AUTO: 95 FL (ref 82–98)
MONOCYTES # BLD AUTO: 0.5 K/UL (ref 0.3–1)
MONOCYTES NFR BLD: 9.3 % (ref 4–15)
NEUTROPHILS # BLD AUTO: 2.9 K/UL (ref 1.8–7.7)
NEUTROPHILS NFR BLD: 54.3 % (ref 38–73)
NONHDLC SERPL-MCNC: 106 MG/DL
NRBC BLD-RTO: 0 /100 WBC
PLATELET # BLD AUTO: 281 K/UL (ref 150–450)
PLATELET # BLD AUTO: 281 K/UL (ref 150–450)
PMV BLD AUTO: 10.4 FL (ref 9.2–12.9)
PMV BLD AUTO: 10.4 FL (ref 9.2–12.9)
POTASSIUM SERPL-SCNC: 4.4 MMOL/L (ref 3.5–5.1)
POTASSIUM SERPL-SCNC: 4.4 MMOL/L (ref 3.5–5.1)
PROT SERPL-MCNC: 7 G/DL (ref 6–8.4)
PROT SERPL-MCNC: 7 G/DL (ref 6–8.4)
RBC # BLD AUTO: 4.15 M/UL (ref 4–5.4)
RBC # BLD AUTO: 4.15 M/UL (ref 4–5.4)
SODIUM SERPL-SCNC: 139 MMOL/L (ref 136–145)
SODIUM SERPL-SCNC: 139 MMOL/L (ref 136–145)
TRIGL SERPL-MCNC: 165 MG/DL (ref 30–150)
TSH SERPL DL<=0.005 MIU/L-ACNC: 3.07 UIU/ML (ref 0.4–4)
TSH SERPL DL<=0.005 MIU/L-ACNC: 3.07 UIU/ML (ref 0.4–4)
WBC # BLD AUTO: 5.4 K/UL (ref 3.9–12.7)
WBC # BLD AUTO: 5.4 K/UL (ref 3.9–12.7)

## 2023-04-12 PROCEDURE — 99999 PR PBB SHADOW E&M-EST. PATIENT-LVL III: ICD-10-PCS | Mod: PBBFAC,,, | Performed by: OPTOMETRIST

## 2023-04-12 PROCEDURE — 99999 PR PBB SHADOW E&M-EST. PATIENT-LVL IV: CPT | Mod: PBBFAC,,, | Performed by: PHYSICIAN ASSISTANT

## 2023-04-12 PROCEDURE — 92014 COMPRE OPH EXAM EST PT 1/>: CPT | Mod: S$PBB,,, | Performed by: OPTOMETRIST

## 2023-04-12 PROCEDURE — 99213 OFFICE O/P EST LOW 20 MIN: CPT | Mod: PBBFAC,PO | Performed by: OPTOMETRIST

## 2023-04-12 PROCEDURE — 36415 COLL VENOUS BLD VENIPUNCTURE: CPT | Mod: PO | Performed by: INTERNAL MEDICINE

## 2023-04-12 PROCEDURE — 99214 PR OFFICE/OUTPT VISIT, EST, LEVL IV, 30-39 MIN: ICD-10-PCS | Mod: S$PBB,,, | Performed by: PHYSICIAN ASSISTANT

## 2023-04-12 PROCEDURE — 84443 ASSAY THYROID STIM HORMONE: CPT | Performed by: INTERNAL MEDICINE

## 2023-04-12 PROCEDURE — 85025 COMPLETE CBC W/AUTO DIFF WBC: CPT | Performed by: PHYSICIAN ASSISTANT

## 2023-04-12 PROCEDURE — 99999 PR PBB SHADOW E&M-EST. PATIENT-LVL III: CPT | Mod: PBBFAC,,, | Performed by: OPTOMETRIST

## 2023-04-12 PROCEDURE — 99214 OFFICE O/P EST MOD 30 MIN: CPT | Mod: S$PBB,,, | Performed by: PHYSICIAN ASSISTANT

## 2023-04-12 PROCEDURE — 99999 PR PBB SHADOW E&M-EST. PATIENT-LVL IV: ICD-10-PCS | Mod: PBBFAC,,, | Performed by: PHYSICIAN ASSISTANT

## 2023-04-12 PROCEDURE — 80061 LIPID PANEL: CPT | Performed by: INTERNAL MEDICINE

## 2023-04-12 PROCEDURE — 92014 PR EYE EXAM, EST PATIENT,COMPREHESV: ICD-10-PCS | Mod: S$PBB,,, | Performed by: OPTOMETRIST

## 2023-04-12 PROCEDURE — 80053 COMPREHEN METABOLIC PANEL: CPT | Performed by: INTERNAL MEDICINE

## 2023-04-12 PROCEDURE — 99214 OFFICE O/P EST MOD 30 MIN: CPT | Mod: PBBFAC,27,PO | Performed by: PHYSICIAN ASSISTANT

## 2023-04-12 RX ORDER — ATENOLOL 50 MG/1
50 TABLET ORAL DAILY
Qty: 90 TABLET | Refills: 3 | Status: SHIPPED | OUTPATIENT
Start: 2023-04-12 | End: 2023-05-12

## 2023-04-12 NOTE — PROGRESS NOTES
"Subjective:      Patient ID: Viv De La Torre is a 73 y.o. female.    Chief Complaint: Eye Problem (Feels like head is intermittently slightly moving /)    Patient is new to me.    HPI  Patient has PMH of hypercholesterolemia, SVT, and hypothyroidism.    [id number] atenolol.  Advised to watch blood pressure/pulse while taking this considering she has not taken this in 4 days and has a low pulse today.    Patient has history of dizziness and has been evaluated.  She reports intermittent slight movement of her head.  Dr. Jimenez recommended optometry.    Review of Systems   HENT:  Positive for tinnitus (baseline).    Eyes:  Negative for visual disturbance.   Respiratory:  Negative for shortness of breath.    Cardiovascular:  Negative for chest pain.   Gastrointestinal:  Negative for nausea and vomiting.   Genitourinary:  Negative for dysuria.   Neurological:  Positive for dizziness (slight intermittent movement of head) and headaches. Negative for light-headedness.   Psychiatric/Behavioral:  Negative for sleep disturbance.        Objective:   /80   Pulse (!) 58   Ht 5' 7" (1.702 m)   Wt 91.6 kg (201 lb 15.1 oz)   SpO2 96%   BMI 31.63 kg/m²     Physical Exam  Vitals reviewed.   Constitutional:       Appearance: Normal appearance. She is well-developed.   HENT:      Head: Normocephalic and atraumatic.      Right Ear: Hearing, tympanic membrane, ear canal and external ear normal.      Left Ear: Hearing, tympanic membrane, ear canal and external ear normal.   Eyes:      General: Lids are normal.      Extraocular Movements:      Right eye: Nystagmus present.      Left eye: Nystagmus present.      Conjunctiva/sclera: Conjunctivae normal.   Cardiovascular:      Rate and Rhythm: Normal rate and regular rhythm.      Heart sounds: Normal heart sounds. No murmur heard.    No friction rub. No gallop.   Pulmonary:      Effort: Pulmonary effort is normal. No respiratory distress.      Breath sounds: Normal breath " sounds. No wheezing, rhonchi or rales.   Musculoskeletal:         General: Normal range of motion.   Skin:     General: Skin is warm and dry.      Findings: No rash.   Neurological:      General: No focal deficit present.      Mental Status: She is alert and oriented to person, place, and time.      Comments: No gait abnormalities.   Psychiatric:         Mood and Affect: Mood normal.         Behavior: Behavior normal. Behavior is cooperative.         Judgment: Judgment normal.     Assessment:      1. Essential tremor    2. Dizziness    3. SVT (supraventricular tachycardia)    4. Hypothyroidism due to acquired atrophy of thyroid       Plan:   1. Essential tremor  Did not appreciate in visit, but noted bilateral nystagmus.  - Ambulatory referral/consult to Neurology; Future    2. Dizziness  Bloodwork today.  - CBC Auto Differential; Future  - Comprehensive Metabolic Panel; Future  - Ambulatory referral/consult to Ophthalmology; Future    3. SVT (supraventricular tachycardia)  Advised caution when taking.  - atenoloL (TENORMIN) 50 MG tablet; Take 1 tablet (50 mg total) by mouth once daily.  Dispense: 90 tablet; Refill: 3    4. Hypothyroidism due to acquired atrophy of thyroid  - TSH; Future    Follow up as needed.  Patient agreed with plan and expressed understanding.  I spent 40 minutes on this encounter, time includes face-to-face, chart review, documentation, test review and orders.    Thank you for allowing me to serve you,

## 2023-04-12 NOTE — PATIENT INSTRUCTIONS
"DRY EYES -- BURNING OR YARON SYMPTOMS:  Use Over The Counter artificial tears as needed for dry eye symptoms.   Some common brands include:  Systane, Optive, Refresh, and Thera-Tears.  These drops can be used as frequently as desired, but may be most helpful use during long periods of concentrated work.  For example, reading / working at the computer. Start with 3-4x per day.     Nighttime Ophthalmic gel or ointments are available: Refresh PM, Genteal, and Lacrilube.    Avoid drops that "get redness out" (Visine, Murine, Clear Eyes), as these may contain medication that could further irritate the eyes, especially with chronic use.    ALLERGY EYES -- ITCHING SYMPTOMS:  Over the counter medications include--Pataday, Zaditor, and Alaway.  Use as directed 1-2 drops daily for symptoms of itching / watering eyes.  These drops will not help for dry eye or exposure symptoms.    REDNESS RELIEF:  Lumify---is a good redness reliever that will not cause irritation if used chronically.        FLASHES / FLOATERS / POSTERIOR VITREOUS DETACHMENT    Call the clinic if you have any further changes in symptoms.  Including:  Increased numbers of floaters or flashing lights, dimness or darkness that moves through or stays constant in your vision, or any pain in the eye (s).    You may sometimes see small specks or clouds moving in your field of vision.  They are called FLOATERS.  You can often see them when looking at a plain background, like a blank wall or blue bon.  Floaters are actually tiny clumps of gel or cells inside the VITREOUS, the clear jelly-like fluid that fills the inside of your eye.    While these objects look like they are in front of your eye, they are actually floating inside.  What you see are the shadows they cast on the RETINA, the nerve layer at the back of the eye that senses light and allows you to see.      POSTERIOR VITREOUS DETACHMENT    The appearance of new floaters may be alarming.  If you suddenly " develop new floaters, you should contact your eye care professional  right away.    The retina can tear if the shrinking vitreous pulls away from the wall of the eye.  This sometimes causes a small amount of bleeding in the eye that may appear as new floaters.    A torn retina is always a serious problem, since it can lead to a retinal detachment.  You should see your eye care professional as soon as possible if:    even one new floater appears suddenly;  you see sudden flashes of light;  you notice other symptoms, like the loss of side vision, or a curtain closes down in your vision        POSTERIOR VITREOUS DETACHMENT is more common for people who:    are nearsighted;  have had cataract surgery;  have had YAG laser surgery of the eye;  have had inflammation inside the eye;  are over age 60.      While some floaters may remain visible, many of them will fade over time and become less noticeable.  Even if you've had some floaters for years, you should have your eyes checked as soon as possible if you notice new ones.    FLASHING LIGHTS    When the vitreous gel rubs or pulls on the retina, you may see what look like flashing lights or lightning streaks.  These flashes can appear off and on for several weeks or months.      Some people experience flashes of light that appear as jagged lines or heat waves in both eyes, lasting 10-20 minutes.  These flashes are caused by a spasm of blood vessels in the brain, which is called a migraine.    If a headache follows these flashes, it's called a migraine headache.  If   no headache occurs, these flashes are called Ophthalmic or Ocular Migraine.

## 2023-04-12 NOTE — PROGRESS NOTES
HPI    Eye problem-    Pt states she feels like head is shaking x 6 weeks. States she's seen PCP   and was told it could be eyes. Saw PA this morning and was told she might   have some nystagmus. Denies any flashes, some floaters.   Last edited by Adwoa Ramey on 4/12/2023  4:16 PM.            Assessment /Plan     For exam results, see Encounter Report.    Dizziness  -     Ambulatory referral/consult to Ophthalmology    Essential tremor    Nevus of choroid of right eye    Dry eye syndrome of both eyes    Glaucoma screening    Pseudophakia of both eyes      Referral for this visit ---ocular health and motility is normal ---no ocular origin for dizziness   During conversation in exam, pt is noted to have a slight tremor of the head, she also feels she has slight tremor in her hands---neither of these are constant at visit today---can consider further neuro eval     3.   Longstanding small nevus near disc OD ---stable   4.   Continue daily ATs bid -tid for comfort martha with long periods of near work   5.   Not suspect   6.   Stable OU w/ MF IOL --good result, s/p Yag cap OU     Can try +1.00 otc for really small print / long periods of near work   Distance vision does not improve with new correction     Pt to discuss w/ pcp at visit in 2 weeks    Discussed and educated patient on current findings /plan.  RTC 1 year, prn if any changes / issues

## 2023-04-13 ENCOUNTER — PATIENT MESSAGE (OUTPATIENT)
Dept: FAMILY MEDICINE | Facility: CLINIC | Age: 73
End: 2023-04-13
Payer: MEDICARE

## 2023-04-13 DIAGNOSIS — R42 DIZZINESS: Primary | ICD-10-CM

## 2023-04-13 DIAGNOSIS — R25.1 TREMOR: ICD-10-CM

## 2023-04-18 ENCOUNTER — TELEPHONE (OUTPATIENT)
Dept: NEUROLOGY | Facility: CLINIC | Age: 73
End: 2023-04-18
Payer: MEDICARE

## 2023-04-18 ENCOUNTER — OFFICE VISIT (OUTPATIENT)
Dept: NEUROLOGY | Facility: CLINIC | Age: 73
End: 2023-04-18
Payer: MEDICARE

## 2023-04-18 DIAGNOSIS — R42 DIZZINESS: ICD-10-CM

## 2023-04-18 DIAGNOSIS — R25.1 TREMOR: Primary | ICD-10-CM

## 2023-04-18 PROCEDURE — 99214 OFFICE O/P EST MOD 30 MIN: CPT | Mod: PBBFAC,PO | Performed by: NURSE PRACTITIONER

## 2023-04-18 PROCEDURE — 99999 PR PBB SHADOW E&M-EST. PATIENT-LVL IV: ICD-10-PCS | Mod: PBBFAC,,, | Performed by: NURSE PRACTITIONER

## 2023-04-18 PROCEDURE — 99205 PR OFFICE/OUTPT VISIT, NEW, LEVL V, 60-74 MIN: ICD-10-PCS | Mod: S$PBB,,, | Performed by: NURSE PRACTITIONER

## 2023-04-18 PROCEDURE — 99205 OFFICE O/P NEW HI 60 MIN: CPT | Mod: S$PBB,,, | Performed by: NURSE PRACTITIONER

## 2023-04-18 PROCEDURE — 99999 PR PBB SHADOW E&M-EST. PATIENT-LVL IV: CPT | Mod: PBBFAC,,, | Performed by: NURSE PRACTITIONER

## 2023-04-18 NOTE — PATIENT INSTRUCTIONS
"What is tremor?   -- Tremor is the medical term for trembling or shaking. A person with tremor has a body part that shakes, and the person cannot control the shaking. Most often this shaking affects the hands or the head, but other body parts can be affected, too. The tremor can be a problem on its own, or it can be caused by another health problem.  There are several different types of tremor. They fall into several main groups:  ?Rest tremors - Rest tremors happen while you are sitting or lying down and relaxed. People who have a rest tremor can usually stop the tremor by making a point of moving the part of their body that shakes.  ?Action tremors - Action tremors happen when you are moving your muscles on purpose. There are a few different kinds of action tremors, including:  Kinetic tremors - These happen when you move on purpose, such as writing or drinking from a cup. Sometimes, the tremor gets worse gradually as you get closer to what you trying to do or reach. This is called "intention" tremor.  Postural tremors - These happen when you try to hold a body part still in a position other than its resting position. For example, your legs might shake when you are standing up, or your arms might shake if you hold them out in front of you.   Isometric tremors - These happen when you move a muscle against something that is still. For example, they might happen when you push against a wall or make a fist with your hand.  ?Functional tremor - Functional tremor can combine features of rest and action tremors. Unlike other kinds of tremor, functional tremor has no known medical cause. This kind of tremor usually gets less severe if you are distracted while your doctor examines you, for example, if they ask you to do something else with another part of your body. Other types of tremor tend to get worse with distraction.     What are the most common causes of rest tremor?   -- The most common cause of rest tremor is " "Parkinson disease. If that is the cause of your tremor, your doctor or nurse will probably focus on treating your Parkinson disease. This will hopefully help reduce your tremor.  Other problems that can cause rest tremors include diseases that damage parts of the brain, and a rare condition called Jun disease, which causes copper to build up in the body.    What are the most common causes of action tremor?   -- The most common cause is something called a "physiologic" tremor. Everyone, even people who are healthy, has a little bit of shaking of the hands. This is what doctors refer to as "physiologic tremor." It is normal, and you don't usually notice it, because it is very mild. But in some cases this "physiologic" or normal tremor can become exaggerated. This can happen:  ?If you take certain medicines, such as those used to treat depression, or asthma and other breathing problems  ?If you drink coffee, smoke cigarettes, or use "stimulants" (including caffeine and certain medicines)  ?If you are anxious, excited, or afraid  ?If your muscles are very tired, for example because you just worked out  ?As the effects of alcohol or other drugs are wearing off  ?If you have an overactive thyroid gland  ?If you have a fever  If your tremor is caused by 1 of the problems listed above, the tremor should go away as soon as the problem goes away. Of course, if your tremor is caused by a medicine, you might not be able to stop taking it. But it might be possible to switch medicines or to lower the dose.    What is essential tremor?   -- Essential tremor is a nervous system problem that causes action tremor. It is different from physiologic tremor in that it is not related to medicines, substances, or physical conditions such as fever. Essential tremor can be passed on in families.  People who have essential tremor usually shake when they try to hold their arms out straight. They also tend to shake when they move their hands " "with a goal in mind. For instance, their hands might shake when they try to write, drink from a glass, or touch their nose with their finger.  Essential tremor sometimes even affects the head. This makes it look as though the person is nodding their head "yes-yes" or shaking their head "no-no."    Is there a test to find out the cause of tremor?   -- No, there is no test. But your doctor or nurse can learn about a lot about your tremor just by asking you questions and watching you move. Your doctor or nurse might send you for a brain scan or blood tests to make sure your tremor is not caused by something serious. But it's likely that they will be able to tell what's wrong just by doing an exam.  How is tremor treated? -- If a tremor is caused by another medical problem, treating that problem - if it can be treated - sometimes helps reduce the tremor, too. For example, people whose tremor is caused by high thyroid hormone levels often stop shaking when their hormone levels go back to normal.  Even when no other medical problems are involved, there are treatments that can help. There are a few medicines that can reduce a person's tremor. If the medicines are not effective enough and the tremor is severe, it is even possible to have a device implanted in the brain that can help control tremor.    "

## 2023-04-18 NOTE — ASSESSMENT & PLAN NOTE
"Patient is a 74 y/o female that presents for complaints of head tremor. Onset ~ 2 months ago and very intermittent. There is no associated hand/leg tremor reported.   Tremor is not bothersome or reported to affect ADLs.  Very slight "no" tremor noted on neuro exam during cranial nerve testing.  Suspect ET   - unaware of familial component  No PD features noted on exam  Discussed gold standard medications    - consider switching atenolol to propranolol vs Primidone   - hold for now as per pt request  Recommend pt track her symptoms in order to identify triggers.   "

## 2023-04-18 NOTE — ASSESSMENT & PLAN NOTE
Chronic for many years  Reported in-depth w/u years ago at main [facility] that was unrevealing  Dizziness described as spinning with occasional nausea, no vomiting, LOC   Meniere's disease was suspected at one point but was ruled out?  Neuro exam non focal  Pt takes acetazolamide PRN  Consider brain imaging in future

## 2023-04-18 NOTE — PROGRESS NOTES
"  NEUROLOGY  Outpatient CONSULT    [facility]  [address] [facility], [place] [address]  [phone] (office) / [phone] (fax)    Patient Name:  Viv De La Torre  :  1950  MR #:  [id number]  Acct #:  [id number]    Date of Neurology Consult: 2023  Name of Provider: BENJY Pierce    Other Physicians:  Aravind Jimenez MD (Primary Care Physician); Aravind Jimenez MD (Referring)      Chief Complaint: Tremors      History of Present Illness (HPI):  Viv De La Torre is a right handed 73 y.o. female with a PMHX of Anxiety, basal cell carcinoma, cataract, depression, dizziness, HLD, Hypothyroidism, OA    Patient is here today for tremor. It is to her head and reported a "no" tremor and very fine. It is very intermittent. This started approximately 8 weeks ago. She denies any medications in the last 6 months. Her parents passed away fairly young and she is not aware of any family members having a tremor. She does admit to drinking alcohol about 3 times a week and hasn't noticed the tremor while consuming alcohol. She does drink tea daily, three 5oz cups nearly every day. Her writing is not affected in that her tremor       She also reports a long standing history of dizziness with periods of intermission. She reports having big workup at [facility] which was unrevealing. It is described as lightheaded but denies feeling faint or LOC. She does endorse spinning with associated nausea. She was prescribed Acetazolamide PRN dizziness prior to . It does offer aid.        Past Medical, Surgical, Family & Social History:   Past Medical History:   Diagnosis Date    AK (actinic keratosis)     Allergy     Seasonal    Anxiety     Arthritis of both knees 2015    Basal cell carcinoma     Basal cell carcinoma 10/06/2021    Right upper back    Cataract     Depression     Dizziness     Fibrocystic breast     Fracture of lateral malleolus     High cholesterol     Hypothyroidism " due to acquired atrophy of thyroid     Nuclear sclerosis - Both Eyes 05/06/2013    Osteoarthritis 2012    [facility]    SVT (supraventricular tachycardia)      Past Surgical History:   Procedure Laterality Date    ANKLE FRACTURE SURGERY  2013    Taiwo Macias MD    BREAST BIOPSY Left     core bx, b9    CATARACT EXTRACTION W/  INTRAOCULAR LENS IMPLANT Right 12/19/2019    Procedure: EXTRACTION, CATARACT, WITH IOL INSERTION;  Surgeon: Aleyda Acosta MD;  Location: [facility], [place], operated by [facility] OR;  Service: Ophthalmology;  Laterality: Right;  LASER ASSISTED    CATARACT EXTRACTION W/  INTRAOCULAR LENS IMPLANT Left 1/16/2020    Procedure: EXTRACTION, CATARACT, WITH IOL INSERTION;  Surgeon: Aleyda Acosta MD;  Location: [facility], [place], operated by [facility] OR;  Service: Ophthalmology;  Laterality: Left;    COLONOSCOPY  08/21/03    COLONOSCOPY N/A 5/6/2021    Procedure: COLONOSCOPY;  Surgeon: Lon Chapin MD;  Location: [facility];  Service: Endoscopy;  Laterality: N/A;    ELBOW FRACTURE SURGERY  2004    Lang Daniels MD    ELBOW SURGERY  2004    Lang Daniels MD    EYE SURGERY  Cataract    FRACTURE SURGERY  Left ankle/ leg- left elbow/arm    fx arm      HUMERUS FRACTURE SURGERY  2004    Lang Daniels MD    HYSTERECTOMY       Family History   Problem Relation Age of Onset    Cancer Mother         lung cancer    Cancer Father         renal cancer    Heart disease Sister     Hypertension Sister     Heart disease Brother     Hypertension Brother     Cataracts Maternal Grandmother     Cancer Sister     Melanoma Neg Hx     Skin cancer Neg Hx     Amblyopia Neg Hx     Blindness Neg Hx     Diabetes Neg Hx     Glaucoma Neg Hx     Macular degeneration Neg Hx     Retinal detachment Neg Hx     Strabismus Neg Hx     Stroke Neg Hx     Thyroid disease Neg Hx      Alcohol use:  reports current alcohol use of about 7.0 - 8.0 standard drinks per week.   (Of note, 0.6 oz = 1 beer or 6 oz = 10 beers).  Tobacco use:  reports that she has never smoked. She has never used smokeless tobacco.  Street drug use:   reports no history of drug use.  Allergies: Penicillins.    Home Medications:     Current Outpatient Medications:     atenoloL (TENORMIN) 50 MG tablet, Take 1 tablet (50 mg total) by mouth once daily., Disp: 90 tablet, Rfl: 3    azelastine (ASTELIN) 137 mcg (0.1 %) nasal spray, 2 sprays (274 mcg total) by Nasal route 2 (two) times daily., Disp: 30 mL, Rfl: 6    bimatoprost (LATISSE) 0.03 % ophthalmic solution, Place 1 application into both eyes every evening. Place one drop on applicator and apply evenly along the skin of the upper eyelid at base of eyelashes once daily at bedtime; repeat procedure for second eye (use a clean applicator)., Disp: 5 mL, Rfl: 12    clindamycin (CLEOCIN T) 1 % lotion, Apply topically 2 (two) times daily., Disp: 60 mL, Rfl: 3    clobetasol (TEMOVATE) 0.05 % cream, APPLY  CREAM TOPICALLY TO AFFECTED AREA TWICE DAILY, Disp: 60 g, Rfl: 3    estradioL (ESTRACE) 0.01 % (0.1 mg/gram) vaginal cream, Place 1 g vaginally every 7 days., Disp: , Rfl:     estradiol (ESTRACE) 2 MG tablet, TAKE 1 TABLET BY MOUTH ONCE DAILY (Patient taking differently: Take 2 mg by mouth once daily.), Disp: 90 tablet, Rfl: 3    levothyroxine (EUTHYROX) 25 MCG tablet, Take 1 tablet (25 mcg total) by mouth once daily., Disp: 90 tablet, Rfl: 2    rosuvastatin (CRESTOR) 10 MG tablet, Take 1 tablet (10 mg total) by mouth nightly., Disp: 90 tablet, Rfl: 3    sertraline (ZOLOFT) 100 MG tablet, Take 1 tablet (100 mg total) by mouth once daily. 1 Tablet Oral Every day, Disp: 90 tablet, Rfl: 3    albuterol (PROAIR HFA) 90 mcg/actuation inhaler, Inhale 2 puffs into the lungs every 6 (six) hours as needed for Wheezing. (Patient not taking: Reported on 4/12/2023), Disp: 3 Inhaler, Rfl: 4    albuterol (PROVENTIL/VENTOLIN HFA) 90 mcg/actuation inhaler, Inhale 2 puffs into the lungs every 4 (four) hours as needed for Wheezing or Shortness of Breath (cough). Rescue (Patient not taking: Reported on 4/12/2023), Disp: 18 g, Rfl:  "0    Physical Examination:  BP (P) 108/66 (BP Location: Right arm, Patient Position: Sitting, BP Method: Medium (Automatic))   Ht (P) 5' 7" (1.702 m)   Wt (P) 91 kg (200 lb 11.7 oz)   BMI (P) 31.44 kg/m²     GENERAL:  General appearance: Well, non-toxic appearing.  No apparent distress.  Neck: supple.  .    MENTAL STATUS:  Alertness, attention span & concentration: normal.  Language: normal.  Orientation to self, place & time:  normal.  Memory, recent & remote: normal.  Fund of knowledge: normal.      SPEECH:  Clear and fluent.  Follows complex commands.      CRANIAL NERVES:  Cranial Nerves II-XII were examined.  II - Visual fields: normal.  III, IV, VI: PERRL, EOMI, No ptosis, No nystagmus.  V - Facial sensation: normal.  VII - Face symmetry & mobility: normal.  VIII - Hearing: normal  IX, X - Palate: mobile & midline.  XI - Shoulder shrug: normal.  XII - Tongue protrusion: normal.        GROSS MOTOR:  Gait & station: non focal  Tone: normal.  Abnormal movements: no resting tremor; very fine tremor noted to head during cranial nerve testing  Finger-nose: normal.  Rapid alternating movements: normal.  Pronator drift: normal      MUSCLE STRENGTH:   Hand grasp:   - right:5/5   - left:5/5    RIGHT    LEFT   5 Neck Ext. 5   5 Neck Flex 5   5 Deltoids 5   5 Biceps 5   5 Triceps 5   5 Forearm.Pr. 5        5 Iliopsoas flex    5   5 Hip Abduct 5   5 Hip Adduct 5   5 Quads 5   5 Hams 5   5 Dorsiflex 5   5 Plantar Flex 5         REFLEXES:    RIGHT Reflex   LEFT   2+ Biceps 2+   2+ Brachiorad. 2+        2+ Patellar 2+         SENSORY:  Light touch: Normal throughout.             Diagnostic Data Reviewed:     Component      Latest Ref Rng & Units 4/12/2023   WBC      3.90 - 12.70 K/uL 5.40   RBC      4.00 - 5.40 M/uL 4.15   Hemoglobin      12.0 - 16.0 g/dL 13.2   Hematocrit      37.0 - 48.5 % 39.2   MCV      82 - 98 fL 95   MCH      27.0 - 31.0 pg 31.8 (H)   MCHC      32.0 - 36.0 g/dL 33.7   RDW      11.5 - 14.5 % 12.5 " "  Platelets      150 - 450 K/uL 281   MPV      9.2 - 12.9 fL 10.4   Immature Granulocytes      0.0 - 0.5 % 0.2   Gran # (ANC)      1.8 - 7.7 K/uL 2.9   Immature Grans (Abs)      0.00 - 0.04 K/uL 0.01   Lymph #      1.0 - 4.8 K/uL 1.8   Mono #      0.3 - 1.0 K/uL 0.5   Eos #      0.0 - 0.5 K/uL 0.1   Baso #      0.00 - 0.20 K/uL 0.05   nRBC      0 /100 WBC 0   Gran %      38.0 - 73.0 % 54.3   Lymph %      18.0 - 48.0 % 33.3   Mono %      4.0 - 15.0 % 9.3   Eosinophil %      0.0 - 8.0 % 2.0   Basophil %      0.0 - 1.9 % 0.9   Differential Method       Automated   Sodium      136 - 145 mmol/L 139   Potassium      3.5 - 5.1 mmol/L 4.4   Chloride      95 - 110 mmol/L 105   CO2      23 - 29 mmol/L 23   Glucose      70 - 110 mg/dL 87   BUN      8 - 23 mg/dL 15   Creatinine      0.5 - 1.4 mg/dL 0.8   Calcium      8.7 - 10.5 mg/dL 9.3   PROTEIN TOTAL      6.0 - 8.4 g/dL 7.0   Albumin      3.5 - 5.2 g/dL 3.7   BILIRUBIN TOTAL      0.1 - 1.0 mg/dL 0.4   Alkaline Phosphatase      55 - 135 U/L 69   AST      10 - 40 U/L 19   ALT      10 - 44 U/L 18   Anion Gap      8 - 16 mmol/L 11   eGFR      >60 mL/min/1.73 m:2 >60.0   TSH      0.400 - 4.000 uIU/mL 3.071             Assessment and Plan:  Viv De La Torre is a 73 y.o. female.    Problem List Items Addressed This Visit          Neuro    Tremor - Primary    Current Assessment & Plan     Patient is a 72 y/o female that presents for complaints of head tremor. Onset ~ 2 months ago and very intermittent. There is no associated hand/leg tremor reported.   Tremor is not bothersome or reported to affect ADLs.  Very slight "no" tremor noted on neuro exam during cranial nerve testing.  Suspect ET   - unaware of familial component  No PD features noted on exam  Discussed gold standard medications    - consider switching atenolol to propranolol vs Primidone   - hold for now as per pt request  Recommend pt track her symptoms in order to identify triggers.               Other    Dizziness    " Current Assessment & Plan     Chronic for many years  Reported in-depth w/u years ago at main campus that was unrevealing  Dizziness described as spinning with occasional nausea, no vomiting, LOC   Meniere's disease was suspected at one point but was ruled out?  Neuro exam non focal  Pt takes acetazolamide PRN  Consider brain imaging in future                              Important to note, also  has a past medical history of AK (actinic keratosis), Allergy, Anxiety, Arthritis of both knees (04/22/2015), Basal cell carcinoma, Basal cell carcinoma (10/06/2021), Cataract, Depression, Dizziness, Fibrocystic breast, Fracture of lateral malleolus, High cholesterol, Hypothyroidism due to acquired atrophy of thyroid, Nuclear sclerosis - Both Eyes (05/06/2013), Osteoarthritis (2012), and SVT (supraventricular tachycardia).            The patient will return to clinic as needed        All questions were answered and patient is comfortable with the plan.       Thank you very much for the opportunity to assist in this patient's care.    If you have any questions or concerns, please do not hesitate to contact me at any time.    Sincerely,     BENJY Pierce  [facility] [place]         I spent a total of 60 minutes on the day of the visit.This includes face to face time and non-face to face time preparing to see the patient (eg, review of tests), Obtaining and/or reviewing separately obtained history, Documenting clinical information in the electronic or other health record, Independently interpreting resultsand communicating results to the patient/family/caregiver, or Care coordination.

## 2023-04-18 NOTE — TELEPHONE ENCOUNTER
----- Message from Tiffanie Schmidt sent at 4/18/2023  8:56 AM CDT -----  Contact: [phone]  Good morning,     Pt called requesting a sooner appointment. She has an appointment scheduled on 07/31@ 9:00 am with Dr. Lane. Pt is requesting  in hopes she can get an appointment before 04/ 28, because she wants to see Dr. Lane before she visits with Dr. Jimenez.    DX: Determination of diagnosis for slight head shaking    Thank you,   Shey Hutton Navigator

## 2023-04-28 ENCOUNTER — OFFICE VISIT (OUTPATIENT)
Dept: FAMILY MEDICINE | Facility: CLINIC | Age: 73
End: 2023-04-28
Payer: MEDICARE

## 2023-04-28 VITALS
OXYGEN SATURATION: 97 % | SYSTOLIC BLOOD PRESSURE: 110 MMHG | DIASTOLIC BLOOD PRESSURE: 62 MMHG | BODY MASS INDEX: 31.44 KG/M2 | HEIGHT: 67 IN | HEART RATE: 62 BPM | WEIGHT: 200.31 LBS

## 2023-04-28 DIAGNOSIS — R25.1 TREMOR: Primary | ICD-10-CM

## 2023-04-28 DIAGNOSIS — I49.3 PVC (PREMATURE VENTRICULAR CONTRACTION): ICD-10-CM

## 2023-04-28 DIAGNOSIS — E03.4 HYPOTHYROIDISM DUE TO ACQUIRED ATROPHY OF THYROID: ICD-10-CM

## 2023-04-28 PROCEDURE — 99214 PR OFFICE/OUTPT VISIT, EST, LEVL IV, 30-39 MIN: ICD-10-PCS | Mod: S$PBB,,, | Performed by: INTERNAL MEDICINE

## 2023-04-28 PROCEDURE — 99999 PR PBB SHADOW E&M-EST. PATIENT-LVL III: CPT | Mod: PBBFAC,,, | Performed by: INTERNAL MEDICINE

## 2023-04-28 PROCEDURE — 99214 OFFICE O/P EST MOD 30 MIN: CPT | Mod: S$PBB,,, | Performed by: INTERNAL MEDICINE

## 2023-04-28 PROCEDURE — 99999 PR PBB SHADOW E&M-EST. PATIENT-LVL III: ICD-10-PCS | Mod: PBBFAC,,, | Performed by: INTERNAL MEDICINE

## 2023-04-28 PROCEDURE — 99213 OFFICE O/P EST LOW 20 MIN: CPT | Mod: PBBFAC,PO | Performed by: INTERNAL MEDICINE

## 2023-04-28 NOTE — PROGRESS NOTES
Subjective     Viv De La Torre is a 73 y.o. old, female here for head shaking    72 y/o with PMH of HLD, SVT/PVC's, hypothyroidism, depression, ET    Patient is here for follow-up on chronic medical problems    New symptoms of head/neck tremor. She has seen optho and neuro.  Hypothyroidism: on LT4, labs wnl  SVT/PVC's: asymptomatic on atenolol. No h/o HTN.    Answers submitted by the patient for this visit:  Review of Systems Questionnaire (Submitted on 4/24/2023)  activity change: No  unexpected weight change: No  rhinorrhea: No  trouble swallowing: No  visual disturbance: No  chest tightness: No  polyuria: No  difficulty urinating: No  menstrual problem: No  joint swelling: No  arthralgias: No  confusion: No  dysphoric mood: No    Review of Systems   HENT:  Negative for hearing loss.    Eyes:  Negative for discharge.   Respiratory:  Negative for wheezing.    Cardiovascular:  Negative for chest pain and palpitations.   Gastrointestinal:  Negative for blood in stool, constipation, diarrhea and vomiting.   Genitourinary:  Negative for dysuria and hematuria.   Musculoskeletal:  Negative for neck pain.   Neurological:  Negative for weakness and headaches.   Endo/Heme/Allergies:  Negative for polydipsia.   Medications     Outpatient Medications Marked as Taking for the 4/28/23 encounter (Office Visit) with Aravind Jimenez MD   Medication Sig Dispense Refill    atenoloL (TENORMIN) 50 MG tablet Take 1 tablet (50 mg total) by mouth once daily. 90 tablet 3    azelastine (ASTELIN) 137 mcg (0.1 %) nasal spray 2 sprays (274 mcg total) by Nasal route 2 (two) times daily. 30 mL 6    bimatoprost (LATISSE) 0.03 % ophthalmic solution Place 1 application into both eyes every evening. Place one drop on applicator and apply evenly along the skin of the upper eyelid at base of eyelashes once daily at bedtime; repeat procedure for second eye (use a clean applicator). 5 mL 12    clindamycin (CLEOCIN T) 1 % lotion Apply topically 2  "(two) times daily. 60 mL 3    clobetasol (TEMOVATE) 0.05 % cream APPLY  CREAM TOPICALLY TO AFFECTED AREA TWICE DAILY 60 g 3    estradioL (ESTRACE) 0.01 % (0.1 mg/gram) vaginal cream Place 1 g vaginally every 7 days.      estradiol (ESTRACE) 2 MG tablet TAKE 1 TABLET BY MOUTH ONCE DAILY (Patient taking differently: Take 2 mg by mouth once daily.) 90 tablet 3    levothyroxine (EUTHYROX) 25 MCG tablet Take 1 tablet (25 mcg total) by mouth once daily. 90 tablet 2    rosuvastatin (CRESTOR) 10 MG tablet Take 1 tablet (10 mg total) by mouth nightly. 90 tablet 3    sertraline (ZOLOFT) 100 MG tablet Take 1 tablet (100 mg total) by mouth once daily. 1 Tablet Oral Every day 90 tablet 3     Objective     /62   Pulse 62   Ht 5' 7" (1.702 m)   Wt 90.9 kg (200 lb 4.6 oz)   SpO2 97%   BMI 31.37 kg/m²   Physical Exam  Constitutional:       General: She is not in acute distress.     Appearance: Normal appearance. She is well-developed.   Neurological:      Mental Status: She is alert.     Assessment and Plan     Tremor    PVC (premature ventricular contraction)    Hypothyroidism due to acquired atrophy of thyroid        No follow-ups on file.  ___________________  Aravind Jimenez MD  Internal Medicine and Pediatrics  "

## 2023-05-05 ENCOUNTER — OFFICE VISIT (OUTPATIENT)
Dept: DERMATOLOGY | Facility: CLINIC | Age: 73
End: 2023-05-05
Payer: MEDICARE

## 2023-05-05 DIAGNOSIS — L85.3 XEROSIS CUTIS: ICD-10-CM

## 2023-05-05 DIAGNOSIS — L01.00 IMPETIGO: Primary | ICD-10-CM

## 2023-05-05 DIAGNOSIS — L82.1 SEBORRHEIC KERATOSES: ICD-10-CM

## 2023-05-05 PROCEDURE — 99213 OFFICE O/P EST LOW 20 MIN: CPT | Mod: S$GLB,,, | Performed by: DERMATOLOGY

## 2023-05-05 PROCEDURE — 99213 PR OFFICE/OUTPT VISIT, EST, LEVL III, 20-29 MIN: ICD-10-PCS | Mod: S$GLB,,, | Performed by: DERMATOLOGY

## 2023-05-05 RX ORDER — CEFADROXIL 500 MG/1
500 CAPSULE ORAL EVERY 12 HOURS
Qty: 20 CAPSULE | Refills: 0 | Status: SHIPPED | OUTPATIENT
Start: 2023-05-05 | End: 2023-05-15

## 2023-05-05 RX ORDER — CLINDAMYCIN PHOSPHATE 10 UG/ML
LOTION TOPICAL 2 TIMES DAILY
Qty: 60 ML | Refills: 1 | Status: SHIPPED | OUTPATIENT
Start: 2023-05-05

## 2023-05-05 NOTE — PROGRESS NOTES
Patient Information  Name: Viv De La Torre  : 1950  MRN: [id number]     Referring Physician:  No ref. provider found   Primary Care Physician:  Aravind Jimenez MD   Date of Visit: 2023      Subjective:     History of Present lllness:    Viv De La Torre is a 73 y.o. female who presents with a chief complaint of lesion.  Location: back of scalp  Duration: 1 month  Signs/Symptoms: spots in scalp, seem to be spreading, still getting new ones, a little tender and oozing   Relieving factors/Prior treatments: none    She also has some bumps on her leg that are itchy.    Clinical documentation obtained by nursing staff reviewed.    Review of Systems    Objective:   Physical Exam   Constitutional: She appears well-developed and well-nourished. No distress.   Neurological: She is alert and oriented to person, place, and time. She is not disoriented.   Psychiatric: She has a normal mood and affect.   Skin:   Areas Examined (abnormalities noted in diagram):   Scalp / Hair Palpated and Inspected  RLE Inspected               Diagram Legend     Erythematous scaling macule/papule c/w actinic keratosis       Vascular papule c/w angioma      Pigmented verrucoid papule/plaque c/w seborrheic keratosis      Yellow umbilicated papule c/w sebaceous hyperplasia      Irregularly shaped tan macule c/w lentigo     1-2 mm smooth white papules consistent with Milia      Movable subcutaneous cyst with punctum c/w epidermal inclusion cyst      Subcutaneous movable cyst c/w pilar cyst      Firm pink to brown papule c/w dermatofibroma      Pedunculated fleshy papule(s) c/w skin tag(s)      Evenly pigmented macule c/w junctional nevus     Mildly variegated pigmented, slightly irregular-bordered macule c/w mildly atypical nevus      Flesh colored to evenly pigmented papule c/w intradermal nevus       Pink pearly papule/plaque c/w basal cell carcinoma      Erythematous hyperkeratotic cursted plaque c/w SCC      Surgical scar with no sign of  skin cancer recurrence      Open and closed comedones      Inflammatory papules and pustules      Verrucoid papule consistent consistent with wart     Erythematous eczematous patches and plaques     Dystrophic onycholytic nail with subungual debris c/w onychomycosis     Umbilicated papule    Erythematous-base heme-crusted tan verrucoid plaque consistent with inflamed seborrheic keratosis     Erythematous Silvery Scaling Plaque c/w Psoriasis     See annotation    No images are attached to the encounter or orders placed in the encounter.      [] Data reviewed  [] Prior external notes reviewed  [] Independent review of test  [] Management discussed with another provider  [] Independent historian    Assessment / Plan:        Impetigo  - acute, uncomplicated problem  -     clindamycin (CLEOCIN T) 1 % lotion; Apply topically 2 (two) times daily. Apply to scalp BID x 2 weeks.  Dispense: 60 mL; Refill: 1  -     cefadroxil (DURICEF) 500 MG Cap; Take 1 capsule (500 mg total) by mouth every 12 (twelve) hours. for 10 days  Dispense: 20 capsule; Refill: 0    Seborrheic keratoses  These are benign, inherited growths without a malignant potential. Reassurance given to patient. No treatment is necessary.    Xerosis cutis  Recommended CeraVe moisturizing cream once or twice daily.      Follow up if symptoms worsen or fail to improve.      Jany Nguyen MD, FAAD  [facility]

## 2023-05-11 ENCOUNTER — PATIENT MESSAGE (OUTPATIENT)
Dept: FAMILY MEDICINE | Facility: CLINIC | Age: 73
End: 2023-05-11
Payer: MEDICARE

## 2023-05-12 ENCOUNTER — PATIENT MESSAGE (OUTPATIENT)
Dept: FAMILY MEDICINE | Facility: CLINIC | Age: 73
End: 2023-05-12
Payer: MEDICARE

## 2023-05-12 RX ORDER — PROPRANOLOL HYDROCHLORIDE 60 MG/1
60 CAPSULE, EXTENDED RELEASE ORAL DAILY
Qty: 90 CAPSULE | Refills: 3 | Status: SHIPPED | OUTPATIENT
Start: 2023-05-12 | End: 2023-08-21

## 2023-06-07 ENCOUNTER — PATIENT MESSAGE (OUTPATIENT)
Dept: FAMILY MEDICINE | Facility: CLINIC | Age: 73
End: 2023-06-07
Payer: MEDICARE

## 2023-06-07 RX ORDER — AZELASTINE 1 MG/ML
2 SPRAY, METERED NASAL 2 TIMES DAILY
Qty: 30 ML | Refills: 6 | Status: SHIPPED | OUTPATIENT
Start: 2023-06-07

## 2023-06-07 NOTE — TELEPHONE ENCOUNTER
No care due was identified.  Health [facility] Embedded Care Due Messages. Reference number: [id number].   6/07/2023 12:40:31 PM CDT

## 2023-06-30 ENCOUNTER — PATIENT MESSAGE (OUTPATIENT)
Dept: FAMILY MEDICINE | Facility: CLINIC | Age: 73
End: 2023-06-30
Payer: MEDICARE

## 2023-08-02 ENCOUNTER — OFFICE VISIT (OUTPATIENT)
Dept: URGENT CARE | Facility: CLINIC | Age: 73
End: 2023-08-02
Payer: MEDICARE

## 2023-08-02 VITALS
DIASTOLIC BLOOD PRESSURE: 67 MMHG | HEART RATE: 82 BPM | TEMPERATURE: 98 F | OXYGEN SATURATION: 98 % | WEIGHT: 200 LBS | BODY MASS INDEX: 31.39 KG/M2 | SYSTOLIC BLOOD PRESSURE: 116 MMHG | HEIGHT: 67 IN | RESPIRATION RATE: 20 BRPM

## 2023-08-02 DIAGNOSIS — U07.1 COVID: ICD-10-CM

## 2023-08-02 DIAGNOSIS — J02.9 SORE THROAT: Primary | ICD-10-CM

## 2023-08-02 DIAGNOSIS — U07.1 COVID-19 VIRUS DETECTED: ICD-10-CM

## 2023-08-02 DIAGNOSIS — J06.9 VIRAL URI: ICD-10-CM

## 2023-08-02 LAB
CTP QC/QA: YES
SARS-COV-2 AG RESP QL IA.RAPID: POSITIVE

## 2023-08-02 PROCEDURE — 99213 PR OFFICE/OUTPT VISIT, EST, LEVL III, 20-29 MIN: ICD-10-PCS | Mod: S$GLB,,, | Performed by: FAMILY MEDICINE

## 2023-08-02 PROCEDURE — 87811 SARS CORONAVIRUS 2 ANTIGEN POCT, MANUAL READ: ICD-10-PCS | Mod: QW,S$GLB,, | Performed by: FAMILY MEDICINE

## 2023-08-02 PROCEDURE — 99213 OFFICE O/P EST LOW 20 MIN: CPT | Mod: S$GLB,,, | Performed by: FAMILY MEDICINE

## 2023-08-02 PROCEDURE — 87811 SARS-COV-2 COVID19 W/OPTIC: CPT | Mod: QW,S$GLB,, | Performed by: FAMILY MEDICINE

## 2023-08-02 NOTE — PROGRESS NOTES
"Subjective:      Patient ID: Viv De La Torre is a 73 y.o. female.    Vitals:  height is 5' 7" (1.702 m) and weight is 90.7 kg (200 lb). Her temperature is 98.2 °F (36.8 °C). Her blood pressure is 116/67 and her pulse is 82. Her respiration is 20 and oxygen saturation is 98%.     Chief Complaint: Sore Throat    Sore Throat   This is a new problem. The current episode started in the past 7 days (3 days). The problem has been gradually worsening. Neither side of throat is experiencing more pain than the other. The pain is at a severity of 3/10 (throat). The pain is moderate. Associated symptoms include coughing, ear pain (both ears), headaches, swollen glands and trouble swallowing. Pertinent negatives include no abdominal pain, congestion, diarrhea, drooling, ear discharge, hoarse voice, plugged ear sensation, neck pain, shortness of breath, stridor or vomiting. She has had no exposure to strep or mono. She has tried oral narcotic analgesics (old rx) for the symptoms. The treatment provided mild relief.       HENT:  Positive for ear pain (both ears), sore throat and trouble swallowing. Negative for ear discharge, drooling and congestion.    Neck: Negative for neck pain.   Respiratory:  Positive for cough. Negative for shortness of breath and stridor.    Gastrointestinal:  Negative for abdominal pain, vomiting and diarrhea.   Neurological:  Positive for headaches.      Objective:     Vitals:    08/02/23 1517   BP: 116/67   BP Location: Left arm   Patient Position: Sitting   BP Method: Large (Automatic)   Pulse: 82   Resp: 20   Temp: 98.2 °F (36.8 °C)   SpO2: 98%   Weight: 90.7 kg (200 lb)   Height: 5' 7" (1.702 m)      Physical Exam   Constitutional: She is oriented to person, place, and time.  Non-toxic appearance. She appears ill. No distress.   HENT:   Head: Atraumatic.   Eyes: Conjunctivae are normal.   Cardiovascular: Normal rate, regular rhythm, normal heart sounds and normal pulses.   Pulmonary/Chest: Effort normal " and breath sounds normal. No stridor. She has no wheezes (at present). She has no rhonchi.   Neurological: She is alert and oriented to person, place, and time.   Skin: Skin is not diaphoretic.   Psychiatric: Judgment and thought content normal.     Results for orders placed or performed in visit on 08/02/23   SARS Coronavirus 2 Antigen, POCT Manual Read   Result Value Ref Range    SARS Coronavirus 2 Antigen Positive (A) Negative     Acceptable Yes         Assessment:     1. Sore throat    2. COVID    3. Viral URI        Plan:       Sore throat  -     SARS Coronavirus 2 Antigen, POCT Manual Read    2. COVID  -     molnupiravir 200 mg capsule (EUA); Take 4 capsules (800 mg total) by mouth every 12 (twelve) hours. for 5 days  Dispense: 40 capsule; Refill: 0    3. Viral URI  -     SARS Coronavirus 2 Antigen, POCT Manual Read       Patient Instructions   Below are suggestions for symptomatic relief of your upper respiratory symptoms:              -Salt water gargles to soothe throat pain.              -Chloroseptic spray and Cepacol lozenges also help to numb throat pain.              -Warm herbal teas with honey/lemon/nena can help soothe sore throat and hoarseness              -Nasal saline spray reduces inflammation and dryness.              -Warm face compresses to help with facial sinus pain/pressure.              -Humidifiers and steam can help with nasal dryness and congestion              -Vicks vapor rub at night for chest congestion.              -Flonase OTC or Nasacort OTC for nasal congestion and post-nasal drip. Ok to use twice daily for the first week, then reduce to once daily after symptoms have begun to improve.              -Afrin is a nasal spray that can give immediate relief of nasal congestion but you cannot use this medication for more than 3 days              -Simple foods like chicken noodle soup.              - Mucinex for congestion or Mucinex DM for cough during the day  time. Delsym helps with coughing at night. Mucinex-D if you have sinus pressure/sinus pain or chest congestion. (caution if history of high blood pressure or palpitations). You must increase your water intake when using expectorants (Mucinex).             -Zyrtec/Claritin/Allegra/Xyzal should help with allergies.  -If you DO NOT have Hypertension or any history of palpitations, it is ok to take over the counter Sudafed or Mucinex D or Allegra-D or Claritin-D or Zyrtec-D.  -If you do take one of the above, it is ok to combine that with plain over the counter Mucinex or Allegra or Claritin or Zyrtec. If, for example, you are taking Zyrtec -D, you can combine that with Mucinex, but not Mucinex-D.  If you are taking Mucinex-D, you can combine that with plain Allegra or Claritin or Zyrtec.   -If you DO have Hypertension or palpitations, it is safe to take Coricidin HBP for relief of sinus symptoms.     Ok to use inhaler.    Your test was POSITIVE for COVID-19 (coronavirus).       Please isolate yourself at home.  You may leave home and/or return to work once the following conditions are met:    If you were not hospitalized and are not moderately to severely immunocompromised:   More than 5 days since symptoms first appeared AND  More than 24 hours fever free without medications AND  Symptoms are improving  Continue to wear a mask around others for 5 additional days.    If you were hospitalized OR are moderately to severely immunocompromised:  More than 20 days since symptoms first appeared  More than 24 hours fever free without medications  Symptoms have improved    If you had no symptoms but tested positive:  More than 5 days since the date of the first positive test (20 days if moderately to severely immunocompromised). If you develop symptoms, then use the guidelines above.  Continue to wear a mask around others for 5 additional days.      Contact Tracing    As one of the next steps, you will receive a call or text  from the [place] Department of Health ([facility]) COVID-19 Tracing Team. See the contact information below so you know not to ignore the health departments call. It is important that you contact them back immediately so they can help.      Contact Tracer Number:  [phone]  Caller ID for most carriers: [place] Dept Health     What is contact tracing?  Contact tracing is a process that helps identify everyone who has been in close contact with an infected person. Contact tracers let those people know they may have been exposed and guide them on next steps. Confidentiality is important for everyone; no one will be told who may have exposed them to the virus.  Your involvement is important. The more we know about where and how this virus is spreading, the better chance we have at stopping it from spreading further.  What does exposure mean?  Exposure means you have been within 6 feet for more than 15 minutes with a person who has or had COVID-19.  What kind of questions do the contact tracers ask?  A contact tracer will confirm your basic contact information including name, address, phone number, and next of kin, as well as asking about any symptoms you may have had. Theyll also ask you how you think you may have gotten sick, such as places where you may have been exposed to the virus, and people you were with. Those names will never be shared with anyone outside of that call, and will only be used to help trace and stop the spread of the virus.   I have privacy concerns. How will the state use my information?  Your privacy about your health is important. All calls are completed using call centers that use the appropriate health privacy protection measures (HIPAA compliance), meaning that your patient information is safe. No one will ever ask you any questions related to immigration status. Your health comes first.   Do I have to participate?  You do not have to participate, but we strongly encourage you to. Contact  tracing can help us catch and control new outbreaks as theyre developing to keep your friends and family safe.   What if I dont hear from anyone?  If you dont receive a call within 24 hours, you can call the number above right away to inquire about your status. That line is open from 8:00 am - 8:00 p.m., 7 days a week.  Contact tracing saves lives! Together, we have the power to beat this virus and keep our loved ones and neighbors safe.    For more information see CDC link below.      https://www.cdc.gov/coronavirus/2019-ncov/hcp/guidance-prevent-spread.html#precautions        Sources:  [facility], [place] [facility]           Seek immediate care in the emergency room in the event of severe abdominal pain, chest pain, respiratory distress, fever unresponsive to antipyretic, dehydration, loss of consciousness, seizure.

## 2023-08-02 NOTE — PATIENT INSTRUCTIONS
Below are suggestions for symptomatic relief of your upper respiratory symptoms:              -Salt water gargles to soothe throat pain.              -Chloroseptic spray and Cepacol lozenges also help to numb throat pain.              -Warm herbal teas with honey/lemon/nena can help soothe sore throat and hoarseness              -Nasal saline spray reduces inflammation and dryness.              -Warm face compresses to help with facial sinus pain/pressure.              -Humidifiers and steam can help with nasal dryness and congestion              -Vicks vapor rub at night for chest congestion.              -Flonase OTC or Nasacort OTC for nasal congestion and post-nasal drip. Ok to use twice daily for the first week, then reduce to once daily after symptoms have begun to improve.              -Afrin is a nasal spray that can give immediate relief of nasal congestion but you cannot use this medication for more than 3 days              -Simple foods like chicken noodle soup.              - Mucinex for congestion or Mucinex DM for cough during the day time. Delsym helps with coughing at night. Mucinex-D if you have sinus pressure/sinus pain or chest congestion. (caution if history of high blood pressure or palpitations). You must increase your water intake when using expectorants (Mucinex).             -Zyrtec/Claritin/Allegra/Xyzal should help with allergies.  -If you DO NOT have Hypertension or any history of palpitations, it is ok to take over the counter Sudafed or Mucinex D or Allegra-D or Claritin-D or Zyrtec-D.  -If you do take one of the above, it is ok to combine that with plain over the counter Mucinex or Allegra or Claritin or Zyrtec. If, for example, you are taking Zyrtec -D, you can combine that with Mucinex, but not Mucinex-D.  If you are taking Mucinex-D, you can combine that with plain Allegra or Claritin or Zyrtec.   -If you DO have Hypertension or palpitations, it is safe to take Coricidin HBP for  relief of sinus symptoms.     Ok to use inhaler.    Your test was POSITIVE for COVID-19 (coronavirus).       Please isolate yourself at home.  You may leave home and/or return to work once the following conditions are met:    If you were not hospitalized and are not moderately to severely immunocompromised:   More than 5 days since symptoms first appeared AND  More than 24 hours fever free without medications AND  Symptoms are improving  Continue to wear a mask around others for 5 additional days.    If you were hospitalized OR are moderately to severely immunocompromised:  More than 20 days since symptoms first appeared  More than 24 hours fever free without medications  Symptoms have improved    If you had no symptoms but tested positive:  More than 5 days since the date of the first positive test (20 days if moderately to severely immunocompromised). If you develop symptoms, then use the guidelines above.  Continue to wear a mask around others for 5 additional days.      Contact Tracing    As one of the next steps, you will receive a call or text from the [place] [facility] ([facility]) COVID-19 Tracing Team. See the contact information below so you know not to ignore the health departments call. It is important that you contact them back immediately so they can help.      Contact Tracer Number:  [phone]  Caller ID for most carriers: [facility]     What is contact tracing?  Contact tracing is a process that helps identify everyone who has been in close contact with an infected person. Contact tracers let those people know they may have been exposed and guide them on next steps. Confidentiality is important for everyone; no one will be told who may have exposed them to the virus.  Your involvement is important. The more we know about where and how this virus is spreading, the better chance we have at stopping it from spreading further.  What does exposure mean?  Exposure means you have been within  6 feet for more than 15 minutes with a person who has or had COVID-19.  What kind of questions do the contact tracers ask?  A contact tracer will confirm your basic contact information including name, address, phone number, and next of kin, as well as asking about any symptoms you may have had. Theyll also ask you how you think you may have gotten sick, such as places where you may have been exposed to the virus, and people you were with. Those names will never be shared with anyone outside of that call, and will only be used to help trace and stop the spread of the virus.   I have privacy concerns. How will the state use my information?  Your privacy about your health is important. All calls are completed using call centers that use the appropriate health privacy protection measures (HIPAA compliance), meaning that your patient information is safe. No one will ever ask you any questions related to immigration status. Your health comes first.   Do I have to participate?  You do not have to participate, but we strongly encourage you to. Contact tracing can help us catch and control new outbreaks as theyre developing to keep your friends and family safe.   What if I dont hear from anyone?  If you dont receive a call within 24 hours, you can call the number above right away to inquire about your status. That line is open from 8:00 am - 8:00 p.m., 7 days a week.  Contact tracing saves lives! Together, we have the power to beat this virus and keep our loved ones and neighbors safe.    For more information see CDC link below.      https://www.cdc.gov/coronavirus/2019-ncov/hcp/guidance-prevent-spread.html#precautions        Sources:  CDC, [place] [facility]           Seek immediate care in the emergency room in the event of severe abdominal pain, chest pain, respiratory distress, fever unresponsive to antipyretic, dehydration, loss of consciousness, seizure.

## 2023-08-04 ENCOUNTER — TELEPHONE (OUTPATIENT)
Dept: FAMILY MEDICINE | Facility: CLINIC | Age: 73
End: 2023-08-04
Payer: MEDICARE

## 2023-08-04 ENCOUNTER — PATIENT MESSAGE (OUTPATIENT)
Dept: FAMILY MEDICINE | Facility: CLINIC | Age: 73
End: 2023-08-04
Payer: MEDICARE

## 2023-08-04 NOTE — TELEPHONE ENCOUNTER
----- Message from Georges Barnes sent at 8/4/2023  5:16 PM CDT -----  Contact: leisa  Type:  Patient Returning Call    Who Called:patient   Who Left Message for Patient:n/a  Does the patient know what this is regarding?:covid   Would the patient rather a call back or a response via [facility]? Call back   Best Call Back Number:[phone]  Additional Information: pt would like to speak to physician

## 2023-08-09 ENCOUNTER — PATIENT MESSAGE (OUTPATIENT)
Dept: FAMILY MEDICINE | Facility: CLINIC | Age: 73
End: 2023-08-09
Payer: MEDICARE

## 2023-08-14 ENCOUNTER — PATIENT MESSAGE (OUTPATIENT)
Dept: FAMILY MEDICINE | Facility: CLINIC | Age: 73
End: 2023-08-14
Payer: MEDICARE

## 2023-08-21 ENCOUNTER — OFFICE VISIT (OUTPATIENT)
Dept: FAMILY MEDICINE | Facility: CLINIC | Age: 73
End: 2023-08-21
Payer: MEDICARE

## 2023-08-21 VITALS
WEIGHT: 190.5 LBS | DIASTOLIC BLOOD PRESSURE: 78 MMHG | OXYGEN SATURATION: 98 % | BODY MASS INDEX: 29.9 KG/M2 | SYSTOLIC BLOOD PRESSURE: 122 MMHG | HEART RATE: 73 BPM | HEIGHT: 67 IN

## 2023-08-21 DIAGNOSIS — R25.1 TREMOR: ICD-10-CM

## 2023-08-21 DIAGNOSIS — E03.4 HYPOTHYROIDISM DUE TO ACQUIRED ATROPHY OF THYROID: ICD-10-CM

## 2023-08-21 DIAGNOSIS — I49.3 PVC (PREMATURE VENTRICULAR CONTRACTION): Primary | ICD-10-CM

## 2023-08-21 PROCEDURE — 99999 PR PBB SHADOW E&M-EST. PATIENT-LVL III: CPT | Mod: PBBFAC,,, | Performed by: INTERNAL MEDICINE

## 2023-08-21 PROCEDURE — 99214 OFFICE O/P EST MOD 30 MIN: CPT | Mod: S$PBB,,, | Performed by: INTERNAL MEDICINE

## 2023-08-21 PROCEDURE — 99999 PR PBB SHADOW E&M-EST. PATIENT-LVL III: ICD-10-PCS | Mod: PBBFAC,,, | Performed by: INTERNAL MEDICINE

## 2023-08-21 PROCEDURE — 99214 PR OFFICE/OUTPT VISIT, EST, LEVL IV, 30-39 MIN: ICD-10-PCS | Mod: S$PBB,,, | Performed by: INTERNAL MEDICINE

## 2023-08-21 PROCEDURE — 99213 OFFICE O/P EST LOW 20 MIN: CPT | Mod: PBBFAC,PO | Performed by: INTERNAL MEDICINE

## 2023-08-21 RX ORDER — ATENOLOL 100 MG/1
100 TABLET ORAL DAILY
Qty: 90 TABLET | Refills: 3 | Status: SHIPPED | OUTPATIENT
Start: 2023-08-21 | End: 2023-11-14

## 2023-08-21 RX ORDER — ATENOLOL 100 MG/1
100 TABLET ORAL DAILY
Qty: 90 TABLET | Refills: 3 | Status: SHIPPED | OUTPATIENT
Start: 2023-08-21 | End: 2023-08-21 | Stop reason: SDUPTHER

## 2023-08-21 RX ORDER — PHENTERMINE HYDROCHLORIDE 15 MG/1
15 CAPSULE ORAL EVERY MORNING
Qty: 30 CAPSULE | Refills: 2 | Status: SHIPPED | OUTPATIENT
Start: 2023-08-21

## 2023-08-21 RX ORDER — PHENTERMINE HYDROCHLORIDE 15 MG/1
15 CAPSULE ORAL EVERY MORNING
Qty: 30 CAPSULE | Refills: 2 | Status: SHIPPED | OUTPATIENT
Start: 2023-08-21 | End: 2023-08-21 | Stop reason: SDUPTHER

## 2023-08-21 RX ORDER — NYSTATIN 100000 U/G
OINTMENT TOPICAL
COMMUNITY
Start: 2023-06-06

## 2023-08-21 NOTE — PROGRESS NOTES
Subjective     Viv De La Torre is a 73 y.o. old, female here for Follow-up    72 y/o with PMH of HLD, SVT/PVC's, hypothyroidism, depression, ET    Recently treated for Covid-19, got molnupiravir. She was sick for about 10 days.    ET: she thought propranolol didn't work, she continues to take atenolol more for SVT and history of PVC's.    Concerned about weight and about overweight: she would like to be on phentermine in the past, she has used this previously to lose weight successfully. She never had any side effects and reassures me it worked well for her in the past and she would report and cardiac symptoms.    Discussed her recent labs and thyroid level.    She is interested in finding a gym to go to regularly.  She has not been drinking etoh the past few weeks.    Review of Systems   Constitutional:  Negative for malaise/fatigue and weight loss.   Respiratory:  Negative for cough and shortness of breath.    Cardiovascular:  Negative for chest pain and palpitations.     Medications     Outpatient Medications Marked as Taking for the 8/21/23 encounter (Office Visit) with Aravind Jimeenz MD   Medication Sig Dispense Refill    albuterol (PROAIR HFA) 90 mcg/actuation inhaler Inhale 2 puffs into the lungs every 6 (six) hours as needed for Wheezing. 3 Inhaler 4    azelastine (ASTELIN) 137 mcg (0.1 %) nasal spray 2 sprays (274 mcg total) by Nasal route 2 (two) times daily. 30 mL 6    bimatoprost (LATISSE) 0.03 % ophthalmic solution Place 1 application into both eyes every evening. Place one drop on applicator and apply evenly along the skin of the upper eyelid at base of eyelashes once daily at bedtime; repeat procedure for second eye (use a clean applicator). 5 mL 12    clindamycin (CLEOCIN T) 1 % lotion Apply topically 2 (two) times daily. Apply to scalp BID x 2 weeks. 60 mL 1    clobetasol (TEMOVATE) 0.05 % cream APPLY  CREAM TOPICALLY TO AFFECTED AREA TWICE DAILY 60 g 3    estradioL (ESTRACE) 0.01 % (0.1  "mg/gram) vaginal cream Place 1 g vaginally every 7 days.      estradiol (ESTRACE) 2 MG tablet TAKE 1 TABLET BY MOUTH ONCE DAILY (Patient taking differently: Take 2 mg by mouth once daily.) 90 tablet 3    levothyroxine (EUTHYROX) 25 MCG tablet Take 1 tablet (25 mcg total) by mouth once daily. 90 tablet 2    nystatin (MYCOSTATIN) ointment SMARTSIG:sparingly Topical 3-4 Times Daily PRN      rosuvastatin (CRESTOR) 10 MG tablet Take 1 tablet (10 mg total) by mouth nightly. 90 tablet 3    sertraline (ZOLOFT) 100 MG tablet Take 1 tablet (100 mg total) by mouth once daily. 1 Tablet Oral Every day 90 tablet 3    [DISCONTINUED] propranoloL (INDERAL LA) 60 MG 24 hr capsule Take 1 capsule (60 mg total) by mouth once daily. 90 capsule 3     Objective     /78   Pulse 73   Ht 5' 7" (1.702 m)   Wt 86.4 kg (190 lb 7.6 oz)   SpO2 98%   BMI 29.83 kg/m²   Physical Exam  Constitutional:       General: She is not in acute distress.     Appearance: Normal appearance. She is well-developed.   Neurological:      Mental Status: She is alert.       Assessment and Plan     PVC (premature ventricular contraction)    Tremor    Hypothyroidism due to acquired atrophy of thyroid    Other orders  -     Discontinue: atenoloL (TENORMIN) 100 MG tablet; Take 1 tablet (100 mg total) by mouth once daily.  Dispense: 90 tablet; Refill: 3  -     Discontinue: phentermine 15 MG capsule; Take 1 capsule (15 mg total) by mouth every morning.  Dispense: 30 capsule; Refill: 2  -     atenoloL (TENORMIN) 100 MG tablet; Take 1 tablet (100 mg total) by mouth once daily.  Dispense: 90 tablet; Refill: 3  -     phentermine 15 MG capsule; Take 1 capsule (15 mg total) by mouth every morning.  Dispense: 30 capsule; Refill: 2      Increase atenolol  Cautious use of phentermine with caloric restriction and exercise for wt loss goals.    No follow-ups on file.  ___________________  Aravind Jimenez MD  Internal Medicine and Pediatrics  "

## 2023-09-08 ENCOUNTER — PATIENT MESSAGE (OUTPATIENT)
Dept: FAMILY MEDICINE | Facility: CLINIC | Age: 73
End: 2023-09-08
Payer: MEDICARE

## 2023-09-10 ENCOUNTER — PATIENT MESSAGE (OUTPATIENT)
Dept: FAMILY MEDICINE | Facility: CLINIC | Age: 73
End: 2023-09-10
Payer: MEDICARE

## 2023-09-11 ENCOUNTER — PATIENT MESSAGE (OUTPATIENT)
Dept: FAMILY MEDICINE | Facility: CLINIC | Age: 73
End: 2023-09-11
Payer: MEDICARE

## 2023-09-11 ENCOUNTER — IMMUNIZATION (OUTPATIENT)
Dept: INTERNAL MEDICINE | Facility: CLINIC | Age: 73
End: 2023-09-11
Payer: MEDICARE

## 2023-09-11 DIAGNOSIS — Z23 NEED FOR VACCINATION: Primary | ICD-10-CM

## 2023-09-11 PROCEDURE — 91312 COVID-19, MRNA, LNP-S, BIVALENT BOOSTER, PF, 30 MCG/0.3 ML DOSE: CPT | Mod: PBBFAC

## 2023-09-11 PROCEDURE — 0124A COVID-19, MRNA, LNP-S, BIVALENT BOOSTER, PF, 30 MCG/0.3 ML DOSE: CPT | Mod: PBBFAC,CV19

## 2023-09-11 PROCEDURE — 99999PBSHW COVID-19, MRNA, LNP-S, BIVALENT BOOSTER, PF, 30 MCG/0.3 ML DOSE: Mod: PBBFAC,,,

## 2023-09-11 PROCEDURE — 99999PBSHW COVID-19, MRNA, LNP-S, BIVALENT BOOSTER, PF, 30 MCG/0.3 ML DOSE: ICD-10-PCS | Mod: PBBFAC,,,

## 2023-09-19 ENCOUNTER — OFFICE VISIT (OUTPATIENT)
Dept: FAMILY MEDICINE | Facility: CLINIC | Age: 73
End: 2023-09-19
Payer: MEDICARE

## 2023-09-19 VITALS
WEIGHT: 187.38 LBS | OXYGEN SATURATION: 95 % | SYSTOLIC BLOOD PRESSURE: 112 MMHG | HEART RATE: 95 BPM | HEIGHT: 67 IN | DIASTOLIC BLOOD PRESSURE: 66 MMHG | BODY MASS INDEX: 29.41 KG/M2

## 2023-09-19 DIAGNOSIS — R25.1 TREMOR: Primary | ICD-10-CM

## 2023-09-19 DIAGNOSIS — Z23 NEED FOR VACCINATION: ICD-10-CM

## 2023-09-19 PROCEDURE — 99213 OFFICE O/P EST LOW 20 MIN: CPT | Mod: PBBFAC,PO,25 | Performed by: INTERNAL MEDICINE

## 2023-09-19 PROCEDURE — 99999 PR PBB SHADOW E&M-EST. PATIENT-LVL III: ICD-10-PCS | Mod: PBBFAC,,, | Performed by: INTERNAL MEDICINE

## 2023-09-19 PROCEDURE — 99999PBSHW PNEUMOCOCCAL CONJUGATE VACCINE 20-VALENT: Mod: PBBFAC,,,

## 2023-09-19 PROCEDURE — 99999 PR PBB SHADOW E&M-EST. PATIENT-LVL III: CPT | Mod: PBBFAC,,, | Performed by: INTERNAL MEDICINE

## 2023-09-19 PROCEDURE — 90694 VACC AIIV4 NO PRSRV 0.5ML IM: CPT | Mod: PBBFAC,PO

## 2023-09-19 PROCEDURE — 99999PBSHW PNEUMOCOCCAL CONJUGATE VACCINE 20-VALENT: ICD-10-PCS | Mod: PBBFAC,,,

## 2023-09-19 PROCEDURE — 99213 PR OFFICE/OUTPT VISIT, EST, LEVL III, 20-29 MIN: ICD-10-PCS | Mod: S$PBB,,, | Performed by: INTERNAL MEDICINE

## 2023-09-19 PROCEDURE — 99999PBSHW FLU VACCINE - QUADRIVALENT - ADJUVANTED: Mod: PBBFAC,,,

## 2023-09-19 PROCEDURE — 90677 PCV20 VACCINE IM: CPT | Mod: PBBFAC,PO

## 2023-09-19 PROCEDURE — 99213 OFFICE O/P EST LOW 20 MIN: CPT | Mod: S$PBB,,, | Performed by: INTERNAL MEDICINE

## 2023-09-19 NOTE — PROGRESS NOTES
Subjective     Viv De La Torre is a 73 y.o. old, female here for Flu Vaccine, Essential Tremor, and Questions about other vaccine she may need    72 y/o with PMH of HLD, SVT/PVC's, hypothyroidism, depression, ET    ET: she increased her atenolol after last visit and perhaps noticed an improvement. Tremor worsens at times in her head/neck and worse with stress.    One of her friends passed yesterday.    She recently got her RSV vaccine. She is due for PCV20 and flu shot.    ROS  Medications     Outpatient Medications Marked as Taking for the 9/19/23 encounter (Office Visit) with Aravind Jimenez MD   Medication Sig Dispense Refill    albuterol (PROAIR HFA) 90 mcg/actuation inhaler Inhale 2 puffs into the lungs every 6 (six) hours as needed for Wheezing. 3 Inhaler 4    albuterol (PROVENTIL/VENTOLIN HFA) 90 mcg/actuation inhaler Inhale 2 puffs into the lungs every 4 (four) hours as needed for Wheezing or Shortness of Breath (cough). Rescue 18 g 0    atenoloL (TENORMIN) 100 MG tablet Take 1 tablet (100 mg total) by mouth once daily. 90 tablet 3    azelastine (ASTELIN) 137 mcg (0.1 %) nasal spray 2 sprays (274 mcg total) by Nasal route 2 (two) times daily. 30 mL 6    bimatoprost (LATISSE) 0.03 % ophthalmic solution Place 1 application into both eyes every evening. Place one drop on applicator and apply evenly along the skin of the upper eyelid at base of eyelashes once daily at bedtime; repeat procedure for second eye (use a clean applicator). 5 mL 12    clindamycin (CLEOCIN T) 1 % lotion Apply topically 2 (two) times daily. Apply to scalp BID x 2 weeks. 60 mL 1    clobetasol (TEMOVATE) 0.05 % cream APPLY  CREAM TOPICALLY TO AFFECTED AREA TWICE DAILY 60 g 3    estradioL (ESTRACE) 0.01 % (0.1 mg/gram) vaginal cream Place 1 g vaginally every 7 days.      estradiol (ESTRACE) 2 MG tablet TAKE 1 TABLET BY MOUTH ONCE DAILY (Patient taking differently: Take 2 mg by mouth once daily.) 90 tablet 3    levothyroxine (EUTHYROX)  "25 MCG tablet Take 1 tablet (25 mcg total) by mouth once daily. 90 tablet 2    nystatin (MYCOSTATIN) ointment SMARTSIG:sparingly Topical 3-4 Times Daily PRN      phentermine 15 MG capsule Take 1 capsule (15 mg total) by mouth every morning. 30 capsule 2    sertraline (ZOLOFT) 100 MG tablet Take 1 tablet (100 mg total) by mouth once daily. 1 Tablet Oral Every day 90 tablet 3     Objective     /66   Pulse 95   Ht 5' 7" (1.702 m)   Wt 85 kg (187 lb 6.3 oz)   SpO2 95%   BMI 29.35 kg/m²   Physical Exam  Constitutional:       General: She is not in acute distress.     Appearance: Normal appearance. She is well-developed.   Neurological:      Mental Status: She is alert.      Motor: No tremor.       Assessment and Plan     Tremor    Need for vaccination  -     Influenza - Quadrivalent (Adjuvanted)  -     Pneumococcal Conjugate Vaccine (20 Valent) (IM)        No follow-ups on file.  ___________________  Aravind Jimenez MD  Internal Medicine and Pediatrics  "

## 2023-09-21 DIAGNOSIS — E78.00 HYPERCHOLESTEROLEMIA: ICD-10-CM

## 2023-09-21 NOTE — TELEPHONE ENCOUNTER
No care due was identified.  [facility] Embedded Care Due Messages. Reference number: [id number].   9/21/2023 5:07:21 PM CDT

## 2023-09-22 RX ORDER — ROSUVASTATIN CALCIUM 10 MG/1
10 TABLET, COATED ORAL NIGHTLY
Qty: 90 TABLET | Refills: 3 | Status: SHIPPED | OUTPATIENT
Start: 2023-09-22 | End: 2024-09-21

## 2023-11-14 ENCOUNTER — OFFICE VISIT (OUTPATIENT)
Dept: FAMILY MEDICINE | Facility: CLINIC | Age: 73
End: 2023-11-14
Payer: MEDICARE

## 2023-11-14 VITALS
WEIGHT: 190.5 LBS | BODY MASS INDEX: 29.83 KG/M2 | DIASTOLIC BLOOD PRESSURE: 72 MMHG | OXYGEN SATURATION: 98 % | HEART RATE: 48 BPM | SYSTOLIC BLOOD PRESSURE: 100 MMHG

## 2023-11-14 DIAGNOSIS — R42 DIZZINESS: Primary | ICD-10-CM

## 2023-11-14 DIAGNOSIS — I47.10 SVT (SUPRAVENTRICULAR TACHYCARDIA): ICD-10-CM

## 2023-11-14 PROCEDURE — 99999 PR PBB SHADOW E&M-EST. PATIENT-LVL III: ICD-10-PCS | Mod: PBBFAC,,, | Performed by: INTERNAL MEDICINE

## 2023-11-14 PROCEDURE — 99213 PR OFFICE/OUTPT VISIT, EST, LEVL III, 20-29 MIN: ICD-10-PCS | Mod: S$PBB,,, | Performed by: INTERNAL MEDICINE

## 2023-11-14 PROCEDURE — 99999 PR PBB SHADOW E&M-EST. PATIENT-LVL III: CPT | Mod: PBBFAC,,, | Performed by: INTERNAL MEDICINE

## 2023-11-14 PROCEDURE — 99213 OFFICE O/P EST LOW 20 MIN: CPT | Mod: S$PBB,,, | Performed by: INTERNAL MEDICINE

## 2023-11-14 PROCEDURE — 99213 OFFICE O/P EST LOW 20 MIN: CPT | Mod: PBBFAC,PO | Performed by: INTERNAL MEDICINE

## 2023-11-14 RX ORDER — ATENOLOL 50 MG/1
50 TABLET ORAL DAILY
Qty: 90 TABLET | Refills: 3 | Status: SHIPPED | OUTPATIENT
Start: 2023-11-14 | End: 2024-11-13

## 2023-11-14 NOTE — PROGRESS NOTES
Subjective     Viv De La Torre is a 73 y.o. old, female here for Dizziness and Medication Refill    72 y/o with PMH of HLD, SVT/PVC's, hypothyroidism, depression, ET     Patient complains of a dizzy spell while walking a few weeks ago. It was a spinning sensation.  There was some palpitations associated with it. It did not last long - maybe a minute. It has not recurred since then. No dyspnea or CP. It is similar to SVT she had previously it sounds. She may have been dehydrated, had too much caffeine, and also may have missed a dose of atenolol. It seems like a perfect storm for recurrence of symptoms. All in all the increased dose of atenolol has not helped her tremor and she would like to go back down to 50 mg daily.    Answers submitted by the patient for this visit:  Review of Systems Questionnaire (Submitted on 11/10/2023)  activity change: No  unexpected weight change: Yes  rhinorrhea: No  trouble swallowing: No  visual disturbance: No  chest tightness: No  polyuria: No  difficulty urinating: No  menstrual problem: No  joint swelling: No  arthralgias: No  confusion: No  dysphoric mood: No    Review of Systems   HENT:  Negative for hearing loss.    Eyes:  Negative for discharge.   Respiratory:  Negative for wheezing.    Cardiovascular:  Positive for palpitations. Negative for chest pain.   Gastrointestinal:  Negative for blood in stool, constipation, diarrhea and vomiting.   Genitourinary:  Negative for dysuria and hematuria.   Musculoskeletal:  Negative for neck pain.   Neurological:  Negative for weakness and headaches.   Endo/Heme/Allergies:  Negative for polydipsia.     Medications     Outpatient Medications Marked as Taking for the 11/14/23 encounter (Office Visit) with Aravind Jimenez MD   Medication Sig Dispense Refill    albuterol (PROAIR HFA) 90 mcg/actuation inhaler Inhale 2 puffs into the lungs every 6 (six) hours as needed for Wheezing. 3 Inhaler 4    albuterol (PROVENTIL/VENTOLIN HFA) 90  mcg/actuation inhaler Inhale 2 puffs into the lungs every 4 (four) hours as needed for Wheezing or Shortness of Breath (cough). Rescue 18 g 0    azelastine (ASTELIN) 137 mcg (0.1 %) nasal spray 2 sprays (274 mcg total) by Nasal route 2 (two) times daily. 30 mL 6    bimatoprost (LATISSE) 0.03 % ophthalmic solution Place 1 application into both eyes every evening. Place one drop on applicator and apply evenly along the skin of the upper eyelid at base of eyelashes once daily at bedtime; repeat procedure for second eye (use a clean applicator). 5 mL 12    clindamycin (CLEOCIN T) 1 % lotion Apply topically 2 (two) times daily. Apply to scalp BID x 2 weeks. 60 mL 1    clobetasol (TEMOVATE) 0.05 % cream APPLY  CREAM TOPICALLY TO AFFECTED AREA TWICE DAILY 60 g 3    estradioL (ESTRACE) 0.01 % (0.1 mg/gram) vaginal cream Place 1 g vaginally every 7 days.      estradiol (ESTRACE) 2 MG tablet TAKE 1 TABLET BY MOUTH ONCE DAILY (Patient taking differently: Take 2 mg by mouth once daily.) 90 tablet 3    levothyroxine (EUTHYROX) 25 MCG tablet Take 1 tablet (25 mcg total) by mouth once daily. 90 tablet 2    nystatin (MYCOSTATIN) ointment SMARTSIG:sparingly Topical 3-4 Times Daily PRN      phentermine 15 MG capsule Take 1 capsule (15 mg total) by mouth every morning. 30 capsule 2    rosuvastatin (CRESTOR) 10 MG tablet Take 1 tablet (10 mg total) by mouth nightly. 90 tablet 3    sertraline (ZOLOFT) 100 MG tablet Take 1 tablet (100 mg total) by mouth once daily. 1 Tablet Oral Every day 90 tablet 3    [DISCONTINUED] atenoloL (TENORMIN) 100 MG tablet Take 1 tablet (100 mg total) by mouth once daily. 90 tablet 3     Objective     /72 (Patient Position: Sitting)   Pulse (!) 48   Wt 86.4 kg (190 lb 7.6 oz)   SpO2 98%   BMI 29.83 kg/m²   Physical Exam  Constitutional:       General: She is not in acute distress.     Appearance: Normal appearance. She is well-developed.   Cardiovascular:      Rate and Rhythm: Normal rate and  regular rhythm.      Heart sounds: No murmur heard.  Pulmonary:      Effort: Pulmonary effort is normal. No respiratory distress.      Breath sounds: Normal breath sounds.       Assessment and Plan     Dizziness    SVT (supraventricular tachycardia)  -     atenoloL (TENORMIN) 50 MG tablet; Take 1 tablet (50 mg total) by mouth once daily.  Dispense: 90 tablet; Refill: 3      ___________________  Aravind Jimenez MD  Internal Medicine and Pediatrics

## 2023-11-15 ENCOUNTER — OFFICE VISIT (OUTPATIENT)
Dept: OPTOMETRY | Facility: CLINIC | Age: 73
End: 2023-11-15
Payer: MEDICARE

## 2023-11-15 DIAGNOSIS — H04.123 DRY EYE SYNDROME OF BOTH EYES: ICD-10-CM

## 2023-11-15 DIAGNOSIS — Z96.1 PSEUDOPHAKIA OF BOTH EYES: ICD-10-CM

## 2023-11-15 DIAGNOSIS — D31.31 NEVUS OF CHOROID OF RIGHT EYE: ICD-10-CM

## 2023-11-15 DIAGNOSIS — Z13.5 GLAUCOMA SCREENING: ICD-10-CM

## 2023-11-15 DIAGNOSIS — Z98.890 S/P YAG CAPSULOTOMY, BILATERAL: ICD-10-CM

## 2023-11-15 DIAGNOSIS — H43.393 VITREOUS FLOATERS, BILATERAL: Primary | ICD-10-CM

## 2023-11-15 PROCEDURE — 99213 OFFICE O/P EST LOW 20 MIN: CPT | Mod: PBBFAC,PO | Performed by: OPTOMETRIST

## 2023-11-15 PROCEDURE — 99999 PR PBB SHADOW E&M-EST. PATIENT-LVL III: ICD-10-PCS | Mod: PBBFAC,,, | Performed by: OPTOMETRIST

## 2023-11-15 PROCEDURE — 99999 PR PBB SHADOW E&M-EST. PATIENT-LVL III: CPT | Mod: PBBFAC,,, | Performed by: OPTOMETRIST

## 2023-11-15 PROCEDURE — 92014 PR EYE EXAM, EST PATIENT,COMPREHESV: ICD-10-PCS | Mod: S$PBB,,, | Performed by: OPTOMETRIST

## 2023-11-15 PROCEDURE — 92014 COMPRE OPH EXAM EST PT 1/>: CPT | Mod: S$PBB,,, | Performed by: OPTOMETRIST

## 2023-11-15 NOTE — PATIENT INSTRUCTIONS
"DRY EYES -- BURNING OR YARON SYMPTOMS:  Use Over The Counter artificial tears as needed for dry eye symptoms.   Some common brands include:  Systane, Optive, Refresh, and Thera-Tears.  These drops can be used as frequently as desired, but may be most helpful use during long periods of concentrated work.  For example, reading / working at the computer. Start with 3-4x per day.     Nighttime Ophthalmic gel or ointments are available: Refresh PM, Genteal, and Lacrilube.    Avoid drops that "get redness out" (Visine, Murine, Clear Eyes), as these may contain medication that could further irritate the eyes, especially with chronic use.    ALLERGY EYES -- ITCHING SYMPTOMS:  Over the counter medications include--Pataday, Zaditor, and Alaway.  Use as directed 1-2 drops daily for symptoms of itching / watering eyes.  These drops will not help for dry eye or exposure symptoms.    REDNESS RELIEF:  Lumify---is a good redness reliever that will not cause irritation if used chronically.       FLASHES / FLOATERS / POSTERIOR VITREOUS DETACHMENT    Call the clinic if you have any further changes in symptoms.  Including:  Increased numbers of floaters or flashing lights, dimness or darkness that moves through or stays constant in your vision, or any pain in the eye (s).    You may sometimes see small specks or clouds moving in your field of vision.  They are called FLOATERS.  You can often see them when looking at a plain background, like a blank wall or blue bon.  Floaters are actually tiny clumps of gel or cells inside the VITREOUS, the clear jelly-like fluid that fills the inside of your eye.    While these objects look like they are in front of your eye, they are actually floating inside.  What you see are the shadows they cast on the RETINA, the nerve layer at the back of the eye that senses light and allows you to see.      POSTERIOR VITREOUS DETACHMENT    The appearance of new floaters may be alarming.  If you suddenly develop " new floaters, you should contact your eye care professional  right away.    The retina can tear if the shrinking vitreous pulls away from the wall of the eye.  This sometimes causes a small amount of bleeding in the eye that may appear as new floaters.    A torn retina is always a serious problem, since it can lead to a retinal detachment.  You should see your eye care professional as soon as possible if:    even one new floater appears suddenly;  you see sudden flashes of light;  you notice other symptoms, like the loss of side vision, or a curtain closes down in your vision        POSTERIOR VITREOUS DETACHMENT is more common for people who:    are nearsighted;  have had cataract surgery;  have had YAG laser surgery of the eye;  have had inflammation inside the eye;  are over age 60.      While some floaters may remain visible, many of them will fade over time and become less noticeable.  Even if you've had some floaters for years, you should have your eyes checked as soon as possible if you notice new ones.    FLASHING LIGHTS    When the vitreous gel rubs or pulls on the retina, you may see what look like flashing lights or lightning streaks.  These flashes can appear off and on for several weeks or months.      Some people experience flashes of light that appear as jagged lines or heat waves in both eyes, lasting 10-20 minutes.  These flashes are caused by a spasm of blood vessels in the brain, which is called a migraine.    If a headache follows these flashes, it's called a migraine headache.  If   no headache occurs, these flashes are called Ophthalmic or Ocular Migraine.

## 2023-11-15 NOTE — PROGRESS NOTES
HPI    Pt here due to decrease in [facility]. Pt would like to get RX to help with near   and distance. Pt has stopped all night driving. Using OTC readers only.   Denies flashes and floaters. Systane as needed for dryness.   Last edited by Krystle Reza on 11/15/2023  1:31 PM.            Assessment /Plan     For exam results, see Encounter Report.    Vitreous floaters, bilateral    Nevus of choroid of right eye    Dry eye syndrome of both eyes    Glaucoma screening    S/P YAG capsulotomy, bilateral    Pseudophakia of both eyes      RD precautions given and reviewed. Patient knows to call/ message if any further changes in symptoms occur.  Stable from previous ---obtain optos images next exam   Longstanding complaint  ---no gross spk moderate tear film ---continue systane or similar bid-qid pending near tasks   Not suspect   5,6.  Stable OU   Good result w/ MF IOL   Vision expectations remain high, but refraction does not sig improve  Advised continue uncorrected for distance   Advised to try low power otc readers +1.25 or +1.50 for near work as needed   Reassured ocular health is normal     Discussed and educated patient on current findings /plan.  RTC 1 year, prn if any changes / issues

## 2023-11-25 DIAGNOSIS — E03.4 HYPOTHYROIDISM DUE TO ACQUIRED ATROPHY OF THYROID: ICD-10-CM

## 2023-11-25 NOTE — TELEPHONE ENCOUNTER
No care due was identified.  [facility] Embedded Care Due Messages. Reference number: [id number].   11/25/2023 12:34:14 PM CST

## 2023-11-26 RX ORDER — LEVOTHYROXINE SODIUM 25 UG/1
25 TABLET ORAL DAILY
Qty: 90 TABLET | Refills: 1 | Status: SHIPPED | OUTPATIENT
Start: 2023-11-26

## 2023-11-26 NOTE — TELEPHONE ENCOUNTER
Refill Decision Note   Viv De La Torre  is requesting a refill authorization.  Brief Assessment and Rationale for Refill:  Approve     Medication Therapy Plan:         Comments:     Note composed:3:53 PM 11/26/2023

## 2023-11-27 RX ORDER — BIMATOPROST 3 UG/ML
1 SOLUTION TOPICAL NIGHTLY
Qty: 5 ML | Refills: 12 | Status: SHIPPED | OUTPATIENT
Start: 2023-11-27

## 2024-01-28 ENCOUNTER — PATIENT MESSAGE (OUTPATIENT)
Dept: FAMILY MEDICINE | Facility: CLINIC | Age: 74
End: 2024-01-28
Payer: MEDICARE

## 2024-01-28 DIAGNOSIS — I47.10 SVT (SUPRAVENTRICULAR TACHYCARDIA): ICD-10-CM

## 2024-02-27 DIAGNOSIS — Z00.00 ENCOUNTER FOR MEDICARE ANNUAL WELLNESS EXAM: ICD-10-CM

## 2024-03-13 DIAGNOSIS — Z78.0 MENOPAUSE: ICD-10-CM

## 2024-04-01 DIAGNOSIS — F32.5 MAJOR DEPRESSIVE DISORDER WITH SINGLE EPISODE, IN FULL REMISSION: ICD-10-CM

## 2024-04-05 RX ORDER — SERTRALINE HYDROCHLORIDE 100 MG/1
100 TABLET, FILM COATED ORAL
Qty: 90 TABLET | Refills: 2 | Status: SHIPPED | OUTPATIENT
Start: 2024-04-05

## 2024-04-05 NOTE — TELEPHONE ENCOUNTER
Care Due:                  Date            Visit Type   Department     Provider  --------------------------------------------------------------------------------                                MYCHART                              FOLLOWUP/OF  [facility]  Last Visit: 11-      FICE VISIT   MEDICINE       Aravind Jimenez  Next Visit: None Scheduled  None         None Found                                                            Last  Test          Frequency    Reason                     Performed    Due Date  --------------------------------------------------------------------------------    CMP.........  12 months..  rosuvastatin.............  04- 04-    Lipid Panel.  12 months..  rosuvastatin.............  04- 04-    TSH.........  12 months..  levothyroxine............  04- 04-    [employer] Embedded Care Due Messages. Reference number: [id number].   4/04/2024 7:41:45 PM CDT

## 2024-04-05 NOTE — TELEPHONE ENCOUNTER
Refill Routing Note   Medication(s) are not appropriate for processing by [facility] for the following reason(s):        Due for refill >6 months ago: no dispenses within past 6 months per Epic data     [facility] action(s):  Defer     Requires labs : Yes - CMP, lipid panel, TSH due 4/7/24            Appointments  past 12m or future 3m with PCP    Date Provider   Last Visit   11/14/2023 Aravind Jimenez MD   Next Visit   Visit date not found Aravind Jimenez MD   ED visits in past 90 days: 0        Note composed:7:42 PM 04/04/2024

## 2024-07-22 ENCOUNTER — OFFICE VISIT (OUTPATIENT)
Dept: FAMILY MEDICINE | Facility: CLINIC | Age: 74
End: 2024-07-22
Payer: MEDICARE

## 2024-07-22 VITALS
HEIGHT: 67 IN | HEART RATE: 65 BPM | DIASTOLIC BLOOD PRESSURE: 62 MMHG | OXYGEN SATURATION: 97 % | WEIGHT: 191.13 LBS | BODY MASS INDEX: 30 KG/M2 | SYSTOLIC BLOOD PRESSURE: 118 MMHG

## 2024-07-22 DIAGNOSIS — I47.10 SVT (SUPRAVENTRICULAR TACHYCARDIA): ICD-10-CM

## 2024-07-22 DIAGNOSIS — Z00.00 ENCOUNTER FOR MEDICARE ANNUAL WELLNESS EXAM: Primary | ICD-10-CM

## 2024-07-22 DIAGNOSIS — I49.3 PVC (PREMATURE VENTRICULAR CONTRACTION): ICD-10-CM

## 2024-07-22 DIAGNOSIS — E78.00 HYPERCHOLESTEROLEMIA: ICD-10-CM

## 2024-07-22 DIAGNOSIS — E03.4 HYPOTHYROIDISM DUE TO ACQUIRED ATROPHY OF THYROID: ICD-10-CM

## 2024-07-22 DIAGNOSIS — Z12.31 ENCOUNTER FOR SCREENING MAMMOGRAM FOR BREAST CANCER: ICD-10-CM

## 2024-07-22 PROCEDURE — G0439 PPPS, SUBSEQ VISIT: HCPCS | Mod: ,,, | Performed by: NURSE PRACTITIONER

## 2024-07-22 PROCEDURE — 99999 PR PBB SHADOW E&M-EST. PATIENT-LVL V: CPT | Mod: PBBFAC,,, | Performed by: NURSE PRACTITIONER

## 2024-07-22 PROCEDURE — 99215 OFFICE O/P EST HI 40 MIN: CPT | Mod: PBBFAC,PO | Performed by: NURSE PRACTITIONER

## 2024-07-22 RX ORDER — AZELASTINE 1 MG/ML
2 SPRAY, METERED NASAL 2 TIMES DAILY
Qty: 30 ML | Refills: 1 | Status: SHIPPED | OUTPATIENT
Start: 2024-07-22

## 2024-07-22 NOTE — PROGRESS NOTES
"  Viv De La Torre presented for an initial Medicare AWV today. The following components were reviewed and updated:    Medical history  Family History  Social history  Allergies and Current Medications  Health Risk Assessment  Health Maintenance  Care Team    **See Completed Assessments for Annual Wellness visit with in the encounter summary    The following assessments were completed:  Depression Screening  Cognitive function Screening    Timed Get Up Test  Whisper Test      Opioid documentation:      Patient does not have a current opioid prescription.          Vitals:    07/22/24 1036   BP: 118/62   BP Location: Left arm   Patient Position: Sitting   BP Method: Medium (Manual)   Pulse: 65   SpO2: 97%   Weight: 86.7 kg (191 lb 2.2 oz)   Height: 5' 7" (1.702 m)     Body mass index is 29.94 kg/m².       Physical Exam  Vitals reviewed.   Constitutional:       General: She is not in acute distress.  HENT:      Head: Normocephalic.   Cardiovascular:      Rate and Rhythm: Normal rate.   Pulmonary:      Effort: Pulmonary effort is normal. No respiratory distress.   Neurological:      General: No focal deficit present.      Mental Status: She is alert.   Psychiatric:         Mood and Affect: Mood normal.           Diagnoses and health risks identified today and associated recommendations/orders:  1. Encounter for Medicare annual wellness exam  Reviewed health maintenance and provided recommendations   Recommend mammogram, pt to schedule  Pt will collect outside records of dxa scan (reports completing dxa within the last 3 years)   - Ambulatory Referral/Consult to Enhanced Annual Wellness Visit (eAWV)    2. SVT (supraventricular tachycardia)  Continue to monitor  Followed by Aravind Jimenez MD   atenolol.      3. Hypercholesterolemia  Continue to monitor  Followed by Aravind Jimenez MD   Rosuvastin 10 mg.      4. Hypothyroidism due to acquired atrophy of thyroid  Continue to monitor  Followed by Aravind Jimenez, " MD   Levothyroxine 25 mcg.      5. PVC (premature ventricular contraction)  Continue to monitor  Followed by Aravind Jimenez MD .      6. Encounter for screening mammogram for breast cancer  - Mammo Digital Screening Bilat w/ Cosmo; Future      Provided Viv with a 5-10 year written screening schedule and personal prevention plan. Recommendations were developed using the USPSTF age appropriate recommendations. Education, counseling, and referrals were provided as needed.  After Visit Summary printed and given to patient which includes a list of additional screenings\tests needed.    No follow-ups on file.      Emmy Leal NP

## 2024-07-22 NOTE — PATIENT INSTRUCTIONS
Counseling and Referral of Other Preventative  (Italic type indicates deductible and co-insurance are waived)    Patient Name: Viv De La Torre  Today's Date: 7/22/2024    Health Maintenance       Date Due Completion Date    DEXA Scan 10/18/2021 10/18/2018    Mammogram 08/23/2023 8/23/2022    COVID-19 Vaccine (7 - 2023-24 season) 11/06/2023 9/11/2023    Influenza Vaccine (1) 09/01/2024 9/19/2023    Colorectal Cancer Screening 05/06/2026 5/6/2021    Lipid Panel 04/12/2028 4/12/2023    TETANUS VACCINE 10/26/2032 10/26/2022        No orders of the defined types were placed in this encounter.    The following information is provided to all patients.  This information is to help you find resources for any of the problems found today that may be affecting your health:                  Living healthy guide: www.[facility].[place].[facility]      Understanding Diabetes: www.diabetes.org      Eating healthy: www.cdc.gov/healthyweight      [facility][note 3] home safety checklist: www.cdc.gov/steadi/patient.html      Agency on Aging: www.goea.[place].[facility]      Alcoholics anonymous (AA): www.aa.org      Physical Activity: www.fabiola.nih.gov/uv2mlhk      Tobacco use: www.quitwithusla.org

## 2024-08-19 ENCOUNTER — PATIENT MESSAGE (OUTPATIENT)
Dept: FAMILY MEDICINE | Facility: CLINIC | Age: 74
End: 2024-08-19
Payer: MEDICARE

## 2024-08-20 ENCOUNTER — HOSPITAL ENCOUNTER (INPATIENT)
Facility: HOSPITAL | Age: 74
LOS: 1 days | Discharge: HOME | DRG: 149 | End: 2024-08-22
Attending: EMERGENCY MEDICINE | Admitting: INTERNAL MEDICINE
Payer: MEDICARE

## 2024-08-20 ENCOUNTER — OFFICE VISIT (OUTPATIENT)
Dept: URGENT CARE | Facility: CLINIC | Age: 74
End: 2024-08-20
Payer: MEDICARE

## 2024-08-20 ENCOUNTER — TELEPHONE (OUTPATIENT)
Dept: NEUROLOGY | Facility: CLINIC | Age: 74
End: 2024-08-20
Payer: MEDICARE

## 2024-08-20 VITALS
HEIGHT: 67 IN | DIASTOLIC BLOOD PRESSURE: 63 MMHG | OXYGEN SATURATION: 96 % | HEART RATE: 54 BPM | WEIGHT: 191 LBS | RESPIRATION RATE: 16 BRPM | SYSTOLIC BLOOD PRESSURE: 96 MMHG | BODY MASS INDEX: 29.98 KG/M2 | TEMPERATURE: 98 F

## 2024-08-20 DIAGNOSIS — I95.1 ORTHOSTATIC HYPOTENSION: ICD-10-CM

## 2024-08-20 DIAGNOSIS — G43.809 VESTIBULAR MIGRAINE: ICD-10-CM

## 2024-08-20 DIAGNOSIS — R42 DIZZINESS: Primary | ICD-10-CM

## 2024-08-20 DIAGNOSIS — F32.5 MAJOR DEPRESSIVE DISORDER WITH SINGLE EPISODE, IN FULL REMISSION: ICD-10-CM

## 2024-08-20 PROCEDURE — 87389 HIV-1 AG W/HIV-1&-2 AB AG IA: CPT | Performed by: PHYSICIAN ASSISTANT

## 2024-08-20 PROCEDURE — 80053 COMPREHEN METABOLIC PANEL: CPT | Performed by: EMERGENCY MEDICINE

## 2024-08-20 PROCEDURE — 83735 ASSAY OF MAGNESIUM: CPT | Performed by: EMERGENCY MEDICINE

## 2024-08-20 PROCEDURE — 93010 ELECTROCARDIOGRAM REPORT: CPT | Mod: ,,, | Performed by: INTERNAL MEDICINE

## 2024-08-20 PROCEDURE — 99285 EMERGENCY DEPT VISIT HI MDM: CPT | Mod: 25

## 2024-08-20 PROCEDURE — 93005 ELECTROCARDIOGRAM TRACING: CPT

## 2024-08-20 PROCEDURE — 84443 ASSAY THYROID STIM HORMONE: CPT | Performed by: EMERGENCY MEDICINE

## 2024-08-20 PROCEDURE — 86803 HEPATITIS C AB TEST: CPT | Performed by: PHYSICIAN ASSISTANT

## 2024-08-20 PROCEDURE — 85025 COMPLETE CBC W/AUTO DIFF WBC: CPT | Performed by: EMERGENCY MEDICINE

## 2024-08-20 PROCEDURE — 99499 UNLISTED E&M SERVICE: CPT | Mod: S$GLB,,, | Performed by: NURSE PRACTITIONER

## 2024-08-20 NOTE — PROGRESS NOTES
"Subjective:      Patient ID: Viv De La Torre is a 74 y.o. female.    Vitals:  height is 5' 7" (1.702 m) and weight is 86.6 kg (191 lb). Her oral temperature is 97.7 °F (36.5 °C). Her blood pressure is 96/63 and her pulse is 54 (abnormal). Her respiration is 16 and oxygen saturation is 96%.     Chief Complaint: Dizziness    Patient presents c.o. dizziness.Symps include a  "blindness in both eyes" and sensitivity to light.Symptoms began 3 weeks ago. Patient has taken ibuprofen for her symptoms.    Dizziness:   Chronicity:  Chronic  Onset:  1 to 4 weeks ago  Progression since onset:  Unchanged  Frequency:  Constantly  Pain Scale:  0/10  Severity:  Mild  Dizziness characteristics:  Off-balance and lightheaded/impending faint  Initial Spell Date and Length:  3 weeks  Frequency of Spells:  Hourly  Duration of Spells:  3 weeks   Associated symptoms: visual disturbances.no hearing loss, no ear congestion, no ear pain, no fever, no headaches, no tinnitus, no nausea, no vomiting, no diaphoresis, no aural fullness, no weakness, no light-headedness, no syncope, no palpitations, no panic, no facial weakness, no slurred speech, no numbness in extremities and no chest pain.  Aggravated by:  Getting up  Treatments tried: ibprofen.  Improvements on treatment:  Mild and no relief   PMH includes: MRI head.no strokes, no cardiac surgery, no neurologic disease, no head trauma, no balance testing, no ear trauma, no ear surgery, no head trauma, no ear infections, no anxiety, no ear tubes, no environmental allergies and no CT head.      Constitution: Negative for sweating and fever.   HENT:  Negative for ear pain, tinnitus and hearing loss.    Cardiovascular:  Negative for chest pain, palpitations and passing out.   Gastrointestinal:  Negative for nausea and vomiting.   Allergic/Immunologic: Negative for environmental allergies.   Neurological:  Positive for dizziness. Negative for light-headedness and headaches.      Objective:     Physical " Exam   Constitutional: She is oriented to person, place, and time. She appears well-developed.  Non-toxic appearance. She does not appear ill. No distress.   HENT:   Head: Normocephalic and atraumatic.   Ears:   Right Ear: Hearing, tympanic membrane, external ear and ear canal normal.   Left Ear: Hearing, tympanic membrane, external ear and ear canal normal.   Nose: Nose normal. No mucosal edema, rhinorrhea or nasal deformity. No epistaxis. Right sinus exhibits no maxillary sinus tenderness and no frontal sinus tenderness. Left sinus exhibits no maxillary sinus tenderness and no frontal sinus tenderness.   Mouth/Throat: Uvula is midline, oropharynx is clear and moist and mucous membranes are normal. No trismus in the jaw. Normal dentition. No uvula swelling. No posterior oropharyngeal erythema.   Eyes: Conjunctivae, EOM and lids are normal. Pupils are equal, round, and reactive to light. No scleral icterus.   Neck: Trachea normal and phonation normal. Neck supple. No neck rigidity present.   Cardiovascular: Normal rate and normal pulses.   Pulmonary/Chest: Effort normal. No respiratory distress.   Abdominal: Normal appearance.   Musculoskeletal: Normal range of motion.         General: No deformity. Normal range of motion.   Neurological: She is alert and oriented to person, place, and time. She displays no weakness. No cranial nerve deficit. She exhibits normal muscle tone. Coordination and gait normal.   Skin: Skin is warm, dry, intact, not diaphoretic and not pale.   Psychiatric: Her speech is normal and behavior is normal. Mood, judgment and thought content normal.   Nursing note and vitals reviewed.      Assessment:     1. Dizziness    2. Orthostatic hypotension        Plan:   Patient urgently evaluated in .  She presents with her son from home for c/o feeling like her head is swimming for 3-4 weeks.  She notes that she has visual changes associated.  At one point in time a few weeks ago she had a sensation  of flashing lights to both of her eyes, maybe just right, she is unsure.  She says that she does note looking at photos on the wall and seeing like lines moving but denies this at present while looking in the room.  She denies headache.  Denies numbness, weakness.  Denies slurred speech.  She feels like she has a constant sensation and 3/30 days has had a few hours of relief.  No history of vertigo.  Denies N/V/D or abdominal pain.  She feels like her mouth is dry.  She has orthostatic hypotension in UC.  Discussed treatment options and son and patient agree to going to the Emergency Department for further evaluation and treatment and possible IV fluids/labs with limitations of UC reviewed.  Denies a history of migraines.  Offered EMS, however patient and son feel comfortable driving.  He is already in contact with neurology and has appointment for her on 8/30/24.      Dizziness  -     Refer to Emergency Dept.    Orthostatic hypotension  -     Refer to Emergency Dept.

## 2024-08-20 NOTE — TELEPHONE ENCOUNTER
"Incoming message recv'd from family friend requesting a call to pt's son, Jimmie. Jimmie lives locally near Mom and assumes responsibility; yet sister, Yuval, a pathologist, lives in [place] and has POA.    Jimmie endorses pt has experienced dizzy spells, "spinning" for over a year. She has also had several falls, not attributed to dizziness, rather "clumsiness."    Pt last saw [facility], in Neurology, and pt's family would like her to establish, at , as she lives here in [facility] now [place]      On Sunday, pt reported to Jimmie she'd had an abrupt onset episode, while driving in daylight hours, where she describes seeing a sudden flash of bright white light causing her to pull over until it passed; we've no real ability to measure duration of event.    Jimmie denies pt has h/o migraine or headache disorder, denies obvious gait disturbance. Son unsure if Mom has imbalance prior to falling. Pt gets regular eye exams, wears glasses, has h/o cataract sx 2 years ago. Pt does not have diabetes or h/o stroke.     Jimmie describes his mother as "focused on wrong things" such as getting a boyfriend or taking supplements for hair growth, something that bothers pt very much. Son endorses pt takes a lot of supplements (which get delivered to his address.) Jimmie and sister speculate supplements may be root cause of some symptoms and that Mom may be dehydrated when she experiences these episodes.    Jimmie and Yuval have considered him taking Mom to ED. Family friend suggests UC alternatively. Jimmie has instructed Mom to gather every medication she takes in a day and put them in a bag so he can take her to a doctor to discuss.     Jimmie endorses pt has active UTI; he picked up RX for mom yesterday, prescribed possibly by PCP vs GYN; no labs notes on file.     Suggested pt see PCP for physical and updated labs.    Offered next available appointment in resident clinic next Friday 8/30, with Dr Colbert. Jimmie accepted " next available, verbalized understanding, and repeated back date/time/location of scheduled appointment. Direct contact info provided. Reached out to PCW for help establishing with PCP.

## 2024-08-21 PROBLEM — G43.809 VESTIBULAR MIGRAINE: Status: ACTIVE | Noted: 2024-08-21

## 2024-08-21 LAB
ALBUMIN SERPL BCP-MCNC: 3.6 G/DL (ref 3.5–5.2)
ALP SERPL-CCNC: 79 U/L (ref 55–135)
ALT SERPL W/O P-5'-P-CCNC: 30 U/L (ref 10–44)
AMPHET+METHAMPHET UR QL: NEGATIVE
AMPHET+METHAMPHET UR QL: NEGATIVE
ANION GAP SERPL CALC-SCNC: 10 MMOL/L (ref 8–16)
AST SERPL-CCNC: 27 U/L (ref 10–40)
BACTERIA #/AREA URNS AUTO: NORMAL /HPF
BACTERIA #/AREA URNS AUTO: NORMAL /HPF
BARBITURATES UR QL SCN>200 NG/ML: NEGATIVE
BARBITURATES UR QL SCN>200 NG/ML: NEGATIVE
BASOPHILS # BLD AUTO: 0.06 K/UL (ref 0–0.2)
BASOPHILS NFR BLD: 0.9 % (ref 0–1.9)
BENZODIAZ UR QL SCN>200 NG/ML: NEGATIVE
BENZODIAZ UR QL SCN>200 NG/ML: NEGATIVE
BILIRUB SERPL-MCNC: 0.3 MG/DL (ref 0.1–1)
BILIRUB UR QL STRIP: NEGATIVE
BILIRUB UR QL STRIP: NEGATIVE
BUN SERPL-MCNC: 17 MG/DL (ref 8–23)
BZE UR QL SCN: NEGATIVE
BZE UR QL SCN: NEGATIVE
CALCIUM SERPL-MCNC: 9.4 MG/DL (ref 8.7–10.5)
CANNABINOIDS UR QL SCN: NEGATIVE
CANNABINOIDS UR QL SCN: NEGATIVE
CAOX CRY UR QL COMP ASSIST: NORMAL
CHLORIDE SERPL-SCNC: 110 MMOL/L (ref 95–110)
CLARITY UR REFRACT.AUTO: CLEAR
CLARITY UR REFRACT.AUTO: CLEAR
CO2 SERPL-SCNC: 19 MMOL/L (ref 23–29)
COLOR UR AUTO: COLORLESS
COLOR UR AUTO: YELLOW
CREAT SERPL-MCNC: 0.9 MG/DL (ref 0.5–1.4)
CREAT UR-MCNC: 147 MG/DL (ref 15–325)
CREAT UR-MCNC: 44 MG/DL (ref 15–325)
DIFFERENTIAL METHOD BLD: ABNORMAL
EOSINOPHIL # BLD AUTO: 0.2 K/UL (ref 0–0.5)
EOSINOPHIL NFR BLD: 2.7 % (ref 0–8)
ERYTHROCYTE [DISTWIDTH] IN BLOOD BY AUTOMATED COUNT: 13 % (ref 11.5–14.5)
EST. GFR  (NO RACE VARIABLE): >60 ML/MIN/1.73 M^2
GLUCOSE SERPL-MCNC: 85 MG/DL (ref 70–110)
GLUCOSE UR QL STRIP: NEGATIVE
GLUCOSE UR QL STRIP: NEGATIVE
HCT VFR BLD AUTO: 39.7 % (ref 37–48.5)
HCV AB SERPL QL IA: NORMAL
HGB BLD-MCNC: 13.3 G/DL (ref 12–16)
HGB UR QL STRIP: NEGATIVE
HGB UR QL STRIP: NEGATIVE
HIV 1+2 AB+HIV1 P24 AG SERPL QL IA: NORMAL
IMM GRANULOCYTES # BLD AUTO: 0.03 K/UL (ref 0–0.04)
IMM GRANULOCYTES NFR BLD AUTO: 0.4 % (ref 0–0.5)
KETONES UR QL STRIP: NEGATIVE
KETONES UR QL STRIP: NEGATIVE
LEUKOCYTE ESTERASE UR QL STRIP: ABNORMAL
LEUKOCYTE ESTERASE UR QL STRIP: ABNORMAL
LYMPHOCYTES # BLD AUTO: 2.4 K/UL (ref 1–4.8)
LYMPHOCYTES NFR BLD: 34.3 % (ref 18–48)
MAGNESIUM SERPL-MCNC: 2.1 MG/DL (ref 1.6–2.6)
MCH RBC QN AUTO: 31.7 PG (ref 27–31)
MCHC RBC AUTO-ENTMCNC: 33.5 G/DL (ref 32–36)
MCV RBC AUTO: 95 FL (ref 82–98)
METHADONE UR QL SCN>300 NG/ML: NEGATIVE
METHADONE UR QL SCN>300 NG/ML: NEGATIVE
MICROSCOPIC COMMENT: NORMAL
MICROSCOPIC COMMENT: NORMAL
MONOCYTES # BLD AUTO: 0.8 K/UL (ref 0.3–1)
MONOCYTES NFR BLD: 11.7 % (ref 4–15)
NEUTROPHILS # BLD AUTO: 3.5 K/UL (ref 1.8–7.7)
NEUTROPHILS NFR BLD: 50 % (ref 38–73)
NITRITE UR QL STRIP: NEGATIVE
NITRITE UR QL STRIP: NEGATIVE
NRBC BLD-RTO: 0 /100 WBC
OHS QRS DURATION: 96 MS
OHS QTC CALCULATION: 422 MS
OPIATES UR QL SCN: NEGATIVE
OPIATES UR QL SCN: NEGATIVE
PCP UR QL SCN>25 NG/ML: NEGATIVE
PCP UR QL SCN>25 NG/ML: NEGATIVE
PH UR STRIP: 5 [PH] (ref 5–8)
PH UR STRIP: 6 [PH] (ref 5–8)
PLATELET # BLD AUTO: 295 K/UL (ref 150–450)
PMV BLD AUTO: 10.5 FL (ref 9.2–12.9)
POTASSIUM SERPL-SCNC: 3.9 MMOL/L (ref 3.5–5.1)
PROT SERPL-MCNC: 6.9 G/DL (ref 6–8.4)
PROT UR QL STRIP: NEGATIVE
PROT UR QL STRIP: NEGATIVE
RBC # BLD AUTO: 4.19 M/UL (ref 4–5.4)
RBC #/AREA URNS AUTO: 0 /HPF (ref 0–4)
RBC #/AREA URNS AUTO: 0 /HPF (ref 0–4)
SODIUM SERPL-SCNC: 139 MMOL/L (ref 136–145)
SP GR UR STRIP: 1.01 (ref 1–1.03)
SP GR UR STRIP: 1.02 (ref 1–1.03)
SQUAMOUS #/AREA URNS AUTO: 1 /HPF
SQUAMOUS #/AREA URNS AUTO: 1 /HPF
TOXICOLOGY INFORMATION: NORMAL
TOXICOLOGY INFORMATION: NORMAL
TSH SERPL DL<=0.005 MIU/L-ACNC: 3.92 UIU/ML (ref 0.4–4)
URN SPEC COLLECT METH UR: ABNORMAL
URN SPEC COLLECT METH UR: ABNORMAL
WBC # BLD AUTO: 6.99 K/UL (ref 3.9–12.7)
WBC #/AREA URNS AUTO: 1 /HPF (ref 0–5)
WBC #/AREA URNS AUTO: 2 /HPF (ref 0–5)

## 2024-08-21 PROCEDURE — G0378 HOSPITAL OBSERVATION PER HR: HCPCS

## 2024-08-21 PROCEDURE — 96360 HYDRATION IV INFUSION INIT: CPT | Mod: 59

## 2024-08-21 PROCEDURE — 81001 URINALYSIS AUTO W/SCOPE: CPT | Mod: 91,XB | Performed by: EMERGENCY MEDICINE

## 2024-08-21 PROCEDURE — 96375 TX/PRO/DX INJ NEW DRUG ADDON: CPT

## 2024-08-21 PROCEDURE — 96372 THER/PROPH/DIAG INJ SC/IM: CPT | Performed by: PHYSICIAN ASSISTANT

## 2024-08-21 PROCEDURE — 80307 DRUG TEST PRSMV CHEM ANLYZR: CPT | Performed by: EMERGENCY MEDICINE

## 2024-08-21 PROCEDURE — 63600175 PHARM REV CODE 636 W HCPCS: Performed by: PHYSICIAN ASSISTANT

## 2024-08-21 PROCEDURE — 63600175 PHARM REV CODE 636 W HCPCS: Performed by: HOSPITALIST

## 2024-08-21 PROCEDURE — A4216 STERILE WATER/SALINE, 10 ML: HCPCS | Performed by: PHYSICIAN ASSISTANT

## 2024-08-21 PROCEDURE — 96374 THER/PROPH/DIAG INJ IV PUSH: CPT

## 2024-08-21 PROCEDURE — 97530 THERAPEUTIC ACTIVITIES: CPT

## 2024-08-21 PROCEDURE — 25000003 PHARM REV CODE 250: Performed by: HOSPITALIST

## 2024-08-21 PROCEDURE — 97165 OT EVAL LOW COMPLEX 30 MIN: CPT

## 2024-08-21 PROCEDURE — 97535 SELF CARE MNGMENT TRAINING: CPT

## 2024-08-21 PROCEDURE — 25000003 PHARM REV CODE 250: Performed by: PHYSICIAN ASSISTANT

## 2024-08-21 PROCEDURE — 81001 URINALYSIS AUTO W/SCOPE: CPT | Mod: XB | Performed by: EMERGENCY MEDICINE

## 2024-08-21 PROCEDURE — 96361 HYDRATE IV INFUSION ADD-ON: CPT

## 2024-08-21 PROCEDURE — 99223 1ST HOSP IP/OBS HIGH 75: CPT | Mod: ,,, | Performed by: OTOLARYNGOLOGY

## 2024-08-21 RX ORDER — KETOROLAC TROMETHAMINE 30 MG/ML
30 INJECTION, SOLUTION INTRAMUSCULAR; INTRAVENOUS ONCE
Status: COMPLETED | OUTPATIENT
Start: 2024-08-21 | End: 2024-08-21

## 2024-08-21 RX ORDER — SODIUM CHLORIDE 0.9 % (FLUSH) 0.9 %
10 SYRINGE (ML) INJECTION EVERY 8 HOURS
Status: DISCONTINUED | OUTPATIENT
Start: 2024-08-21 | End: 2024-08-22 | Stop reason: HOSPADM

## 2024-08-21 RX ORDER — ACETAMINOPHEN 325 MG/1
650 TABLET ORAL EVERY 6 HOURS PRN
Status: DISCONTINUED | OUTPATIENT
Start: 2024-08-21 | End: 2024-08-22 | Stop reason: HOSPADM

## 2024-08-21 RX ORDER — IBUPROFEN 200 MG
16 TABLET ORAL
Status: DISCONTINUED | OUTPATIENT
Start: 2024-08-21 | End: 2024-08-22 | Stop reason: HOSPADM

## 2024-08-21 RX ORDER — DIPHENHYDRAMINE HYDROCHLORIDE 50 MG/ML
25 INJECTION INTRAMUSCULAR; INTRAVENOUS EVERY 8 HOURS
Status: DISCONTINUED | OUTPATIENT
Start: 2024-08-21 | End: 2024-08-22

## 2024-08-21 RX ORDER — SODIUM CHLORIDE, SODIUM LACTATE, POTASSIUM CHLORIDE, CALCIUM CHLORIDE 600; 310; 30; 20 MG/100ML; MG/100ML; MG/100ML; MG/100ML
INJECTION, SOLUTION INTRAVENOUS CONTINUOUS
Status: DISCONTINUED | OUTPATIENT
Start: 2024-08-21 | End: 2024-08-21

## 2024-08-21 RX ORDER — IBUPROFEN 200 MG
800 TABLET ORAL DAILY PRN
COMMUNITY

## 2024-08-21 RX ORDER — MUPIROCIN 20 MG/G
OINTMENT TOPICAL 2 TIMES DAILY PRN
COMMUNITY

## 2024-08-21 RX ORDER — SODIUM CHLORIDE 450 MG/100ML
INJECTION, SOLUTION INTRAVENOUS CONTINUOUS
Status: DISCONTINUED | OUTPATIENT
Start: 2024-08-21 | End: 2024-08-22

## 2024-08-21 RX ORDER — SERTRALINE HYDROCHLORIDE 100 MG/1
100 TABLET, FILM COATED ORAL DAILY
Status: DISCONTINUED | OUTPATIENT
Start: 2024-08-21 | End: 2024-08-22 | Stop reason: HOSPADM

## 2024-08-21 RX ORDER — CLOBETASOL PROPIONATE 0.5 MG/G
OINTMENT TOPICAL 2 TIMES DAILY
COMMUNITY

## 2024-08-21 RX ORDER — MAGNESIUM SULFATE 1 G/100ML
1 INJECTION INTRAVENOUS EVERY 8 HOURS
Status: DISCONTINUED | OUTPATIENT
Start: 2024-08-21 | End: 2024-08-22 | Stop reason: HOSPADM

## 2024-08-21 RX ORDER — LEVOTHYROXINE SODIUM 25 UG/1
25 TABLET ORAL DAILY
Status: DISCONTINUED | OUTPATIENT
Start: 2024-08-21 | End: 2024-08-22 | Stop reason: HOSPADM

## 2024-08-21 RX ORDER — GLUCAGON 1 MG
1 KIT INJECTION
Status: DISCONTINUED | OUTPATIENT
Start: 2024-08-21 | End: 2024-08-22 | Stop reason: HOSPADM

## 2024-08-21 RX ORDER — IBUPROFEN 200 MG
24 TABLET ORAL
Status: DISCONTINUED | OUTPATIENT
Start: 2024-08-21 | End: 2024-08-22 | Stop reason: HOSPADM

## 2024-08-21 RX ORDER — ATENOLOL 100 MG/1
100 TABLET ORAL DAILY
COMMUNITY

## 2024-08-21 RX ORDER — AMOXICILLIN 250 MG
1 CAPSULE ORAL 2 TIMES DAILY PRN
Status: DISCONTINUED | OUTPATIENT
Start: 2024-08-21 | End: 2024-08-22 | Stop reason: HOSPADM

## 2024-08-21 RX ORDER — TALC
6 POWDER (GRAM) TOPICAL NIGHTLY PRN
Status: DISCONTINUED | OUTPATIENT
Start: 2024-08-21 | End: 2024-08-22 | Stop reason: HOSPADM

## 2024-08-21 RX ORDER — ATENOLOL 25 MG/1
50 TABLET ORAL DAILY
Status: DISCONTINUED | OUTPATIENT
Start: 2024-08-21 | End: 2024-08-21

## 2024-08-21 RX ORDER — PROCHLORPERAZINE EDISYLATE 5 MG/ML
5 INJECTION INTRAMUSCULAR; INTRAVENOUS EVERY 8 HOURS
Status: DISCONTINUED | OUTPATIENT
Start: 2024-08-21 | End: 2024-08-22 | Stop reason: HOSPADM

## 2024-08-21 RX ORDER — MECLIZINE HCL 12.5 MG 12.5 MG/1
25 TABLET ORAL 3 TIMES DAILY
Status: DISCONTINUED | OUTPATIENT
Start: 2024-08-21 | End: 2024-08-21

## 2024-08-21 RX ORDER — NALOXONE HCL 0.4 MG/ML
0.02 VIAL (ML) INJECTION
Status: DISCONTINUED | OUTPATIENT
Start: 2024-08-21 | End: 2024-08-22 | Stop reason: HOSPADM

## 2024-08-21 RX ORDER — HEPARIN SODIUM 5000 [USP'U]/ML
5000 INJECTION, SOLUTION INTRAVENOUS; SUBCUTANEOUS EVERY 8 HOURS
Status: DISCONTINUED | OUTPATIENT
Start: 2024-08-21 | End: 2024-08-22 | Stop reason: HOSPADM

## 2024-08-21 RX ORDER — ATORVASTATIN CALCIUM 40 MG/1
40 TABLET, FILM COATED ORAL DAILY
Status: DISCONTINUED | OUTPATIENT
Start: 2024-08-21 | End: 2024-08-22 | Stop reason: HOSPADM

## 2024-08-21 RX ORDER — METHYLPREDNISOLONE SOD SUCC 125 MG
125 VIAL (EA) INJECTION EVERY 8 HOURS
Status: DISCONTINUED | OUTPATIENT
Start: 2024-08-21 | End: 2024-08-22

## 2024-08-21 RX ORDER — CLONAZEPAM 0.5 MG/1
0.5 TABLET ORAL 3 TIMES DAILY PRN
Status: DISCONTINUED | OUTPATIENT
Start: 2024-08-21 | End: 2024-08-21

## 2024-08-21 RX ADMIN — HEPARIN SODIUM 5000 UNITS: 5000 INJECTION INTRAVENOUS; SUBCUTANEOUS at 02:08

## 2024-08-21 RX ADMIN — SODIUM CHLORIDE 1000 ML: 0.9 INJECTION, SOLUTION INTRAVENOUS at 04:08

## 2024-08-21 RX ADMIN — HEPARIN SODIUM 5000 UNITS: 5000 INJECTION INTRAVENOUS; SUBCUTANEOUS at 06:08

## 2024-08-21 RX ADMIN — MAGNESIUM SULFATE 1 G: 500 INJECTION, SOLUTION INTRAMUSCULAR; INTRAVENOUS at 11:08

## 2024-08-21 RX ADMIN — SERTRALINE HYDROCHLORIDE 100 MG: 50 TABLET ORAL at 08:08

## 2024-08-21 RX ADMIN — KETOROLAC TROMETHAMINE 30 MG: 30 INJECTION, SOLUTION INTRAMUSCULAR at 02:08

## 2024-08-21 RX ADMIN — Medication 10 ML: at 10:08

## 2024-08-21 RX ADMIN — SODIUM CHLORIDE: 4.5 INJECTION, SOLUTION INTRAVENOUS at 03:08

## 2024-08-21 RX ADMIN — METHYLPREDNISOLONE SODIUM SUCCINATE 125 MG: 125 INJECTION, POWDER, FOR SOLUTION INTRAMUSCULAR; INTRAVENOUS at 10:08

## 2024-08-21 RX ADMIN — METHYLPREDNISOLONE SODIUM SUCCINATE 125 MG: 125 INJECTION, POWDER, FOR SOLUTION INTRAMUSCULAR; INTRAVENOUS at 02:08

## 2024-08-21 RX ADMIN — Medication 10 ML: at 06:08

## 2024-08-21 RX ADMIN — SODIUM CHLORIDE, POTASSIUM CHLORIDE, SODIUM LACTATE AND CALCIUM CHLORIDE: 600; 310; 30; 20 INJECTION, SOLUTION INTRAVENOUS at 05:08

## 2024-08-21 RX ADMIN — PROCHLORPERAZINE EDISYLATE 5 MG: 5 INJECTION INTRAMUSCULAR; INTRAVENOUS at 02:08

## 2024-08-21 RX ADMIN — PROCHLORPERAZINE EDISYLATE 5 MG: 5 INJECTION INTRAMUSCULAR; INTRAVENOUS at 10:08

## 2024-08-21 RX ADMIN — ATENOLOL 50 MG: 25 TABLET ORAL at 08:08

## 2024-08-21 RX ADMIN — HEPARIN SODIUM 5000 UNITS: 5000 INJECTION INTRAVENOUS; SUBCUTANEOUS at 10:08

## 2024-08-21 RX ADMIN — DEXTROSE 500 MG: 50 INJECTION, SOLUTION INTRAVENOUS at 10:08

## 2024-08-21 RX ADMIN — DIPHENHYDRAMINE HYDROCHLORIDE 25 MG: 50 INJECTION, SOLUTION INTRAMUSCULAR; INTRAVENOUS at 10:08

## 2024-08-21 RX ADMIN — LEVOTHYROXINE SODIUM 25 MCG: 25 TABLET ORAL at 08:08

## 2024-08-21 RX ADMIN — DIPHENHYDRAMINE HYDROCHLORIDE 25 MG: 50 INJECTION, SOLUTION INTRAMUSCULAR; INTRAVENOUS at 02:08

## 2024-08-21 RX ADMIN — Medication 10 ML: at 02:08

## 2024-08-21 NOTE — PT/OT/SLP EVAL
"Occupational Therapy   Evaluation and Discharge Note    Name: Viv De La Torre  MRN: [id number]  Admitting Diagnosis: Dizziness  Recent Surgery: * No surgery found *      Recommendations:     Discharge Recommendations: No Therapy Indicated  Discharge Equipment Recommendations: none  Barriers to discharge:  None    Assessment:     Viv De La Torre is a 74 y.o. female with a medical diagnosis of Dizziness. Pt was willing to participate and tolerated session well overall. Pt performed functional mobility and ADLs assessed without assistance and demonstrated good functional endurance with ambulation into hallway. Pt showed good safety awareness during t/f's. Pt is currently functioning at appropriate independence levels with adequate support at home to be discharged from acute OT services at this time.     Pt will have support from son upon d/c.     Plan:     During this hospitalization, patient does not require further acute OT services.  Please re-consult if situation changes.    Plan of Care Reviewed with: patient    Subjective     Chief Complaint: dizziness  Patient/Family Comments/goals: " I've had this my whole life just not this bad."    Occupational Profile:  Living Environment: 2nd story apt; 3 AIMEE c/ LHR and 16 steps c/ LHR to 2nd flr; t/s, standard toilet, lives alone  Previous level of function: indep, drives, no falls reported  Roles and Routines: enjoys playing tennis, cooking  Equipment Used at home: none  Assistance upon Discharge: per pt report, son works during day but lives a few streets down, can assist as needed    Pain/Comfort:  Pain Rating 1: 0/10  Pain Rating Post-Intervention 1: 0/10    Patients cultural, spiritual, Yarsanism conflicts given the current situation: no    Objective:     Communicated with: RN prior to session.  Patient found supine with peripheral IV, telemetry upon OT entry to room.    General Precautions: Standard, fall  Orthopedic Precautions: N/A  Braces: N/A  Respiratory Status: Room air "     Occupational Performance:    Bed Mobility:    Patient completed Rolling/Turning to Right with independence  Patient completed Scooting/Bridging with independence  Patient completed Supine to Sit with independence  Patient completed Sit to Supine with independence    Functional Mobility/Transfers:  Patient completed Sit <> Stand Transfer with independence  with  no assistive device   Functional Mobility: from bed into hallway and back to bed to simulate household distances; SPV no AD; no LOB    Activities of Daily Living:  Upper Body Dressing: minimum assistance donned gown as robe sitting eob; assistance 2/2 to medical lines  Lower Body Dressing: independence retrieved and donned shoes  Toileting: pt not needing to void at this time      Cognitive/Visual Perceptual:  Cognitive/Psychosocial Skills:     -       Oriented to: Person, Place, Time, and Situation   -       Follows Commands/attention:Follows multistep  commands  -       Safety awareness/insight to disability: intact     Physical Exam:  Balance:    -       static sitting balance: indep; static standing balance: indep; dynamic standing balance: SPV  Upper Extremity Range of Motion:     -       Right Upper Extremity: WFL  -       Left Upper Extremity: WFL  Upper Extremity Strength:    -       Right Upper Extremity: WFL  -       Left Upper Extremity: WFL   Strength:    -       Right Upper Extremity: WFL  -       Left Upper Extremity: WFL  Fine Motor Coordination:    -       Intact  Left hand thumb/finger opposition skills, Right hand thumb/finger opposition skills, Left hand, manipulation of objects, and Right hand, manipulation of objects    AMPAC 6 Click ADL:  AMPAC Total Score: 24    Treatment & Education:  Pt edu on role of OT, POC, safety when performing self care tasks , benefit of performing OOB activity, and safety when performing functional transfers and mobility.  - White board updated  - Self care tasks completed-- as noted above      Patient  left supine with all lines intact, call button in reach, and RN notified      History:     Past Medical History:   Diagnosis Date    AK (actinic keratosis)     Allergy     Seasonal    Anxiety     Arthritis of both knees 04/22/2015    Basal cell carcinoma     Basal cell carcinoma 10/06/2021    Right upper back    Cataract     Depression     Dizziness     Fibrocystic breast     Fracture of lateral malleolus     High cholesterol     Hypothyroidism due to acquired atrophy of thyroid     Nuclear sclerosis - Both Eyes 05/06/2013    Osteoarthritis 2012    [facility]    SVT (supraventricular tachycardia)          Past Surgical History:   Procedure Laterality Date    ANKLE FRACTURE SURGERY  2013    Taiwo Macias MD    BREAST BIOPSY Left     core bx, b9    CATARACT EXTRACTION W/  INTRAOCULAR LENS IMPLANT Right 12/19/2019    Procedure: EXTRACTION, CATARACT, WITH IOL INSERTION;  Surgeon: Aleyda Acosta MD;  Location: [facility] OR;  Service: Ophthalmology;  Laterality: Right;  LASER ASSISTED    CATARACT EXTRACTION W/  INTRAOCULAR LENS IMPLANT Left 1/16/2020    Procedure: EXTRACTION, CATARACT, WITH IOL INSERTION;  Surgeon: Aleyda Acosta MD;  Location: [facility] OR;  Service: Ophthalmology;  Laterality: Left;    COLONOSCOPY  08/21/03    COLONOSCOPY N/A 5/6/2021    Procedure: COLONOSCOPY;  Surgeon: Lon Chapin MD;  Location: [facility] ENDO;  Service: Endoscopy;  Laterality: N/A;    ELBOW FRACTURE SURGERY  2004    Lang Daniels MD    ELBOW SURGERY  2004    Lang Daniels MD    EYE SURGERY  Cataract    FRACTURE SURGERY  Left ankle/ leg- left elbow/arm    fx arm      HUMERUS FRACTURE SURGERY  2004    Lang Daniels MD    HYSTERECTOMY         Time Tracking:     OT Date of Treatment: 08/21/24  OT Start Time: 1012  OT Stop Time: 1045  OT Total Time (min): 33 min    Billable Minutes:Evaluation 8  Self Care/Home Management 16  Therapeutic Activity 9    8/21/2024

## 2024-08-21 NOTE — ED NOTES
"Patient with lengthy h/o head " spinning" not room spinning x 3 weeks, reports she has been seen mult times by [facility] states " flash " of light 4 weeks ago,none since, reports b/p 70*/60  "

## 2024-08-21 NOTE — H&P
"[facility]  History & Physical    Patient Name: Viv De La Torre  MRN: [id number]  Patient Class: OP- Observation  Admission Date: 8/20/2024  Attending Physician: Dianna Sorenson MD   Primary Care Provider: Aravind Jimenez MD         Patient information was obtained from patient, past medical records, and ER records.     Subjective:     Principal Problem:Dizziness    Chief Complaint:   Chief Complaint   Patient presents with    Dizziness     For 3 wks; hx of orthostatic hypotension        HPI: Ms. Viv De La Torre is a 74 y.o. female, with PMH of Meniere's disease, hypothyroidism, anxiety, depression, HLD, PVCs/SVT, who presented to [facility] ED on 8/20/24 due to worsening dizziness x 3 weeks. She is now having difficulty ambulating and performing her ADLs. She reports associated "blindness" in both eyes and light sensitivity. She states she has been taking ibuprofen for her symptoms.  She was evaluated in the ED with normal labs.  A drug screen was pan negative.  UA showed 1+ glucose readings.  An MRI of the brain showed no acute intracranial abnormalities, there was a hyperintensity noted the supratentorial white matter likely reflecting chronic microvascular ischemic change.  She has had multiple urgent care visits the past 3 weeks [facility] which have noted systolic blood pressures in the 70s.  She was placed on observation.     Past Medical History:   Diagnosis Date    AK (actinic keratosis)     Allergy     Seasonal    Anxiety     Arthritis of both knees 04/22/2015    Basal cell carcinoma     Basal cell carcinoma 10/06/2021    Right upper back    Cataract     Depression     Dizziness     Fibrocystic breast     Fracture of lateral malleolus     High cholesterol     Hypothyroidism due to acquired atrophy of thyroid     Nuclear sclerosis - Both Eyes 05/06/2013    Osteoarthritis 2012    [facility]    SVT (supraventricular tachycardia)        Past Surgical History:   Procedure " Laterality Date    ANKLE FRACTURE SURGERY  2013    Taiwo Macias MD    BREAST BIOPSY Left     core bx, b9    CATARACT EXTRACTION W/  INTRAOCULAR LENS IMPLANT Right 12/19/2019    Procedure: EXTRACTION, CATARACT, WITH IOL INSERTION;  Surgeon: Aleyda Acotsa MD;  Location: [facility] OR;  Service: Ophthalmology;  Laterality: Right;  LASER ASSISTED    CATARACT EXTRACTION W/  INTRAOCULAR LENS IMPLANT Left 1/16/2020    Procedure: EXTRACTION, CATARACT, WITH IOL INSERTION;  Surgeon: Aleyda Acosta MD;  Location: [facility] OR;  Service: Ophthalmology;  Laterality: Left;    COLONOSCOPY  08/21/03    COLONOSCOPY N/A 5/6/2021    Procedure: COLONOSCOPY;  Surgeon: Lon Chapin MD;  Location: [facility] ENDO;  Service: Endoscopy;  Laterality: N/A;    ELBOW FRACTURE SURGERY  2004    Lang Daniels MD    ELBOW SURGERY  2004    Lang Daniels MD    EYE SURGERY  Cataract    FRACTURE SURGERY  Left ankle/ leg- left elbow/arm    fx arm      HUMERUS FRACTURE SURGERY  2004    Lang Daniels MD    HYSTERECTOMY         Review of patient's allergies indicates:   Allergen Reactions    Penicillins Other (See Comments)     Other reaction(s): Unknown       No current facility-administered medications on file prior to encounter.     Current Outpatient Medications on File Prior to Encounter   Medication Sig    albuterol (PROAIR HFA) 90 mcg/actuation inhaler Inhale 2 puffs into the lungs every 6 (six) hours as needed for Wheezing.    albuterol (PROVENTIL/VENTOLIN HFA) 90 mcg/actuation inhaler Inhale 2 puffs into the lungs every 4 (four) hours as needed for Wheezing or Shortness of Breath (cough). Rescue    atenoloL (TENORMIN) 50 MG tablet Take 1 tablet (50 mg total) by mouth once daily.    azelastine (ASTELIN) 137 mcg (0.1 %) nasal spray 2 sprays (274 mcg total) by Nasal route 2 (two) times daily.    bimatoprost (LATISSE) 0.03 % ophthalmic solution Place 1 application  into both eyes every evening. Place one drop on applicator and apply evenly along the skin of the upper  eyelid at base of eyelashes once daily at bedtime; repeat procedure for second eye (use a clean applicator).    clindamycin (CLEOCIN T) 1 % lotion Apply topically 2 (two) times daily. Apply to scalp BID x 2 weeks.    clobetasol (TEMOVATE) 0.05 % cream APPLY  CREAM TOPICALLY TO AFFECTED AREA TWICE DAILY    estradioL (ESTRACE) 0.01 % (0.1 mg/gram) vaginal cream Place 1 g vaginally every 7 days.    estradiol (ESTRACE) 2 MG tablet TAKE 1 TABLET BY MOUTH ONCE DAILY (Patient taking differently: Take 2 mg by mouth once daily.)    levothyroxine (EUTHYROX) 25 MCG tablet Take 1 tablet (25 mcg total) by mouth once daily.    nystatin (MYCOSTATIN) ointment SMARTSIG:sparingly Topical 3-4 Times Daily PRN    phentermine 15 MG capsule Take 1 capsule (15 mg total) by mouth every morning.    rosuvastatin (CRESTOR) 10 MG tablet Take 1 tablet (10 mg total) by mouth nightly.    sertraline (ZOLOFT) 100 MG tablet TAKE 1 TABLET BY MOUTH EVERY DAY     Family History       Problem Relation (Age of Onset)    Cancer Mother, Father, Sister    Cataracts Maternal Grandmother    Heart disease Sister, Brother    Hypertension Sister, Brother          Tobacco Use    Smoking status: Never    Smokeless tobacco: Never   Substance and Sexual Activity    Alcohol use: Yes     Alcohol/week: 7.0 - 8.0 standard drinks of alcohol     Types: 4 - 5 Glasses of wine, 3 Cans of beer per week     Comment: Not every week Not every day    Drug use: No    Sexual activity: Yes     Partners: Male     Birth control/protection: Post-menopausal     Comment: Total hysterectomy 2004     Review of Systems   Constitutional:  Negative for activity change, appetite change, chills, diaphoresis, fatigue and fever.   Respiratory:  Negative for cough, shortness of breath and wheezing.    Cardiovascular:  Negative for chest pain and palpitations.   Gastrointestinal:  Negative for abdominal distention, abdominal pain, constipation, diarrhea, nausea and vomiting.   Genitourinary:   Negative for dysuria, flank pain, frequency, hematuria and urgency.   Musculoskeletal:  Negative for arthralgias, back pain, gait problem, joint swelling, myalgias, neck pain and neck stiffness.   Skin:  Negative for color change and pallor.   Neurological:  Positive for dizziness. Negative for syncope, weakness, light-headedness, numbness and headaches.   Psychiatric/Behavioral:  Negative for agitation and confusion.      Objective:     Vital Signs (Most Recent):  Temp: 97.8 °F (36.6 °C) (08/21/24 0604)  Pulse: (!) 45 (08/21/24 0604)  Resp: 18 (08/21/24 0604)  BP: 124/67 (08/21/24 0604)  SpO2: 95 % (08/21/24 0604) Vital Signs (24h Range):  Temp:  [97.7 °F (36.5 °C)-98.7 °F (37.1 °C)] 97.8 °F (36.6 °C)  Pulse:  [45-63] 45  Resp:  [16-18] 18  SpO2:  [95 %-99 %] 95 %  BP: ()/(60-79) 124/67     Weight: 86.6 kg (190 lb 14.7 oz)  Body mass index is 29.9 kg/m².     Physical Exam  Vitals and nursing note reviewed.   Constitutional:       General: She is not in acute distress.     Appearance: Normal appearance. She is well-developed and normal weight. She is not ill-appearing, toxic-appearing or diaphoretic.   HENT:      Head: Normocephalic and atraumatic.   Eyes:      General: No scleral icterus.        Right eye: No discharge.         Left eye: No discharge.      Conjunctiva/sclera: Conjunctivae normal.   Neck:      Trachea: No tracheal deviation.   Cardiovascular:      Rate and Rhythm: Normal rate and regular rhythm.      Heart sounds: Normal heart sounds. No murmur heard.     No gallop.   Pulmonary:      Effort: Pulmonary effort is normal. No respiratory distress.      Breath sounds: Normal breath sounds. No stridor. No wheezing or rales.   Abdominal:      General: Bowel sounds are normal. There is no distension.      Palpations: Abdomen is soft. There is no mass.      Tenderness: There is no abdominal tenderness. There is no guarding.   Musculoskeletal:         General: No deformity. Normal range of motion.       "Cervical back: Normal range of motion and neck supple.   Skin:     General: Skin is warm and dry.      Coloration: Skin is not pale.      Findings: No erythema or rash.   Neurological:      General: No focal deficit present.      Mental Status: She is alert and oriented to person, place, and time.      Cranial Nerves: No cranial nerve deficit.      Motor: No abnormal muscle tone.   Psychiatric:         Mood and Affect: Mood normal.         Behavior: Behavior normal.         Thought Content: Thought content normal.         Judgment: Judgment normal.                Significant Labs: All pertinent labs within the past 24 hours have been reviewed.  BMP:   Recent Labs   Lab 08/20/24  2245   GLU 85      K 3.9      CO2 19*   BUN 17   CREATININE 0.9   CALCIUM 9.4   MG 2.1     CBC:   Recent Labs   Lab 08/20/24  2245   WBC 6.99   HGB 13.3   HCT 39.7        CMP:   Recent Labs   Lab 08/20/24  2245      K 3.9      CO2 19*   GLU 85   BUN 17   CREATININE 0.9   CALCIUM 9.4   PROT 6.9   ALBUMIN 3.6   BILITOT 0.3   ALKPHOS 79   AST 27   ALT 30   ANIONGAP 10     Urine Culture: No results for input(s): "LABURIN" in the last 48 hours.  Urine Studies:   Recent Labs   Lab 08/21/24  0540   COLORU Colorless*   APPEARANCEUA Clear   PHUR 5.0   SPECGRAV 1.010   PROTEINUA Negative   GLUCUA Negative   KETONESU Negative   BILIRUBINUA Negative   OCCULTUA Negative   NITRITE Negative   LEUKOCYTESUR Trace*   RBCUA 0   WBCUA 1   BACTERIA Rare   SQUAMEPITHEL 1       Significant Imaging: I have reviewed all pertinent imaging results/findings within the past 24 hours.  Imaging Results              CT Head Without Contrast (Final result)  Result time 08/21/24 04:55:16      Final result by Alphonso Reyes MD (08/21/24 04:55:16)                   Impression:      Head CT:    No acute intracranial abnormality.    Sequela of chronic microvascular ischemic change.     images: Grade 1 C2-C3 anterolisthesis; age or etiology " not determined on this scan.  Correlate clinically.    Electronically signed by resident: Tom Jean Baptiste  Date:    08/21/2024  Time:    03:09    Electronically signed by: Alphonso Reyes  Date:    08/21/2024  Time:    04:55               Narrative:    EXAMINATION:  CT HEAD WITHOUT CONTRAST    CLINICAL HISTORY:  Dizziness, persistent/recurrent, cardiac or vascular cause suspected;Mental status change, unknown cause;    TECHNIQUE:  Low dose axial CT images obtained throughout the head without the use of intravenous contrast.  Axial, sagittal and coronal reconstructions were performed.    COMPARISON:  MRI 08/21/2024    FINDINGS:  INTRACRANIAL COMPARTMENT:    Ventricles and sulci are normal in size for age without evidence of hydrocephalus.    Mild patchy hypoattenuation in the supratentorial white matter, nonspecific but most likely reflecting chronic microvascular ischemic changes.    No parenchymal mass, hemorrhage, edema or major vascular distribution infarct.    No extra-axial blood or fluid collections.    SKULL/EXTRACRANIAL CONTENTS (limited evaluation):    No depressed calvarial fracture.   Mastoid air cells and paranasal sinuses are essentially clear.     images: Grade 1 C2-C3 anterolisthesis; age or etiology not determined on this scan.                                       MRI Brain Without Contrast (Final result)  Result time 08/21/24 02:36:00      Final result by Sasha Fitzgerald MD (08/21/24 02:36:00)                   Impression:      Noncontrast MRI demonstrates no acute intracranial abnormality.  Specifically, no evidence of acute infarct.    T2/FLAIR hyperintensity in the supratentorial white matter, nonspecific but likely reflecting sequela of chronic microvascular ischemic change.      Electronically signed by: Sasha Fitzgerald MD  Date:    08/21/2024  Time:    02:36               Narrative:    EXAMINATION:  MRI BRAIN WITHOUT CONTRAST    CLINICAL HISTORY:  Dizziness, persistent/recurrent, cardiac  or vascular cause suspected;    TECHNIQUE:  Multiplanar multisequence MR imaging of the brain was performed without contrast.    COMPARISON:  None.    FINDINGS:  There is no abnormal restricted diffusion to suggest acute infarction. The ventricles are normal in size and configuration for age without evidence for hydrocephalus.  There is mild generalized cerebral volume loss.  There is scattered T2/FLAIR hyperintensity within the supratentorial white matter, nonspecific but likely reflecting sequela of chronic microvascular ischemic change.  No evidence of acute parenchymal hemorrhage.  Small focus of remote hemosiderin deposition in the paramedian left frontal lobe.  No extra-axial hemorrhage or fluid collections. The craniocervical junction and sellar regions are within normal limits.                                       Assessment/Plan:     * Dizziness  - Ms. Viv De La Torre presents with 3 weeks of progressive dizziness   - she has h/o Meniere's disease   - she has had multiple recent Urgent Care visits with low SBP readings (As low as 70s)  - hydrate overnight  - orthostatic vital signs qshift   - fall precautions   - Neuro consult     Hypothyroidism due to acquired atrophy of thyroid  - continue synthroid      Hypercholesterolemia  - continue statin         VTE Risk Mitigation (From admission, onward)           Ordered     heparin (porcine) injection 5,000 Units  Every 8 hours         08/21/24 0428     IP VTE HIGH RISK PATIENT  Once         08/21/24 0428     Place sequential compression device  Until discontinued         08/21/24 0428                         On 08/21/2024, patient should be placed in hospital observation services under the care of Dianna Sorenson MD.           Kate Hart PA-C  Department of Hospital Medicine  [facility] - Emergency Dept

## 2024-08-21 NOTE — ASSESSMENT & PLAN NOTE
- Ms. Viv De La Torre presents with 3 weeks of progressive dizziness   - she has h/o Meniere's disease   - she has had multiple recent Urgent Care visits with low SBP readings (As low as 70s)  - hydrate overnight  - orthostatic vital signs qshift   - fall precautions   - Neuro consult

## 2024-08-21 NOTE — PHARMACY MED REC
"Admission Medication History     The home medication history was taken by Kleber Dias.    You may go to "Admission" then "Reconcile Home Medications" tabs to review and/or act upon these items.     The home medication list has been updated by the Pharmacy department.   Please read ALL comments highlighted in yellow.   Please address this information as you see fit.    Feel free to contact us if you have any questions or require assistance.      The medications listed below were removed from the home medication list. Please reorder if appropriate:  Patient reports no longer taking the following medication(s):  CLINDAMYCIN 1 % LOTION  NYSTATIN OINT    Medications listed below were obtained from: Patient/family and Analytic software- [employer]  Current Outpatient Medications on File Prior to Encounter   Medication Sig    atenoloL (TENORMIN) 100 MG tablet   Take 100 mg by mouth once daily.    azelastine (ASTELIN) 137 mcg (0.1 %) nasal spray   2 sprays (274 mcg total) by Nasal route 2 (two) times daily.    bimatoprost (LATISSE) 0.03 % ophthalmic solution Place one drop on applicator and apply evenly along the skin of the upper eyelid at base of eyelashes once daily at bedtime; repeat procedure for second eye (use a clean applicator).      estradioL (ESTRACE) 0.01 % (0.1 mg/gram) vaginal cream   Place 1 g vaginally once daily.    estradiol (ESTRACE) 2 MG tablet   Take 2 mg by mouth once daily.    ibuprofen (ADVIL,MOTRIN) 200 MG tablet   Take 800 mg by mouth daily as needed for Pain.    levothyroxine (EUTHYROX) 25 MCG tablet   Take 1 tablet (25 mcg total) by mouth once daily.    mupirocin (BACTROBAN) 2 % ointment   Apply topically 2 (two) times daily as needed (Rash).    phentermine 15 MG capsule Take 1 capsule (15 mg total) by mouth every morning.      rosuvastatin (CRESTOR) 10 MG tablet   Take 1 tablet (10 mg total) by mouth nightly.    sertraline (ZOLOFT) 100 MG tablet   Take 100 mg by mouth once daily.    albuterol " (PROVENTIL/VENTOLIN HFA) 90 mcg/actuation inhaler Inhale 2 puffs into the lungs every 4 (four) hours as needed for Wheezing or Shortness of Breath (cough). Rescue      clobetasol 0.05% (TEMOVATE) 0.05 % Oint Apply topically 2 (two) times daily.             Kleber Dias  [id number]                  .

## 2024-08-21 NOTE — HPI
"Ms. Viv De La Torre is a 74 y.o. female, with PMH of Meniere's disease, hypothyroidism, anxiety, depression, HLD, PVCs/SVT, who presented to [facility] ED on 8/20/24 due to worsening dizziness x 3 weeks. She is now having difficulty ambulating and performing her ADLs. She reports associated "blindness" in both eyes and light sensitivity. She states she has been taking ibuprofen for her symptoms.  She was evaluated in the ED with normal labs.  A drug screen was pan negative.  UA showed 1+ glucose readings.  An MRI of the brain showed no acute intracranial abnormalities, there was a hyperintensity noted the supratentorial white matter likely reflecting chronic microvascular ischemic change.  She has had multiple urgent care visits the past 3 weeks [facility] which have noted systolic blood pressures in the 70s.  She was placed on observation.   "

## 2024-08-21 NOTE — SUBJECTIVE & OBJECTIVE
Past Medical History:   Diagnosis Date    AK (actinic keratosis)     Allergy     Seasonal    Anxiety     Arthritis of both knees 04/22/2015    Basal cell carcinoma     Basal cell carcinoma 10/06/2021    Right upper back    Cataract     Depression     Dizziness     Fibrocystic breast     Fracture of lateral malleolus     High cholesterol     Hypothyroidism due to acquired atrophy of thyroid     Nuclear sclerosis - Both Eyes 05/06/2013    Osteoarthritis 2012    [facility]    SVT (supraventricular tachycardia)        Past Surgical History:   Procedure Laterality Date    ANKLE FRACTURE SURGERY  2013    Taiwo Macias MD    BREAST BIOPSY Left     core bx, b9    CATARACT EXTRACTION W/  INTRAOCULAR LENS IMPLANT Right 12/19/2019    Procedure: EXTRACTION, CATARACT, WITH IOL INSERTION;  Surgeon: Aleyda Acosta MD;  Location: [facility], [place], operated by [facility] OR;  Service: Ophthalmology;  Laterality: Right;  LASER ASSISTED    CATARACT EXTRACTION W/  INTRAOCULAR LENS IMPLANT Left 1/16/2020    Procedure: EXTRACTION, CATARACT, WITH IOL INSERTION;  Surgeon: Aleyda Acosta MD;  Location: [facility], [place], operated by [facility] OR;  Service: Ophthalmology;  Laterality: Left;    COLONOSCOPY  08/21/03    COLONOSCOPY N/A 5/6/2021    Procedure: COLONOSCOPY;  Surgeon: Lon Chapin MD;  Location: [facility] ENDO;  Service: Endoscopy;  Laterality: N/A;    ELBOW FRACTURE SURGERY  2004    Lang Daniels MD    ELBOW SURGERY  2004    Lang Daniels MD    EYE SURGERY  Cataract    FRACTURE SURGERY  Left ankle/ leg- left elbow/arm    fx arm      HUMERUS FRACTURE SURGERY  2004    Lang Daniels MD    HYSTERECTOMY         Review of patient's allergies indicates:   Allergen Reactions    Penicillins Other (See Comments)     Other reaction(s): Unknown       No current facility-administered medications on file prior to encounter.     Current Outpatient Medications on File Prior to Encounter   Medication Sig    albuterol (PROAIR HFA) 90 mcg/actuation inhaler Inhale 2 puffs into the lungs every 6 (six) hours as needed for Wheezing.     albuterol (PROVENTIL/VENTOLIN HFA) 90 mcg/actuation inhaler Inhale 2 puffs into the lungs every 4 (four) hours as needed for Wheezing or Shortness of Breath (cough). Rescue    atenoloL (TENORMIN) 50 MG tablet Take 1 tablet (50 mg total) by mouth once daily.    azelastine (ASTELIN) 137 mcg (0.1 %) nasal spray 2 sprays (274 mcg total) by Nasal route 2 (two) times daily.    bimatoprost (LATISSE) 0.03 % ophthalmic solution Place 1 application  into both eyes every evening. Place one drop on applicator and apply evenly along the skin of the upper eyelid at base of eyelashes once daily at bedtime; repeat procedure for second eye (use a clean applicator).    clindamycin (CLEOCIN T) 1 % lotion Apply topically 2 (two) times daily. Apply to scalp BID x 2 weeks.    clobetasol (TEMOVATE) 0.05 % cream APPLY  CREAM TOPICALLY TO AFFECTED AREA TWICE DAILY    estradioL (ESTRACE) 0.01 % (0.1 mg/gram) vaginal cream Place 1 g vaginally every 7 days.    estradiol (ESTRACE) 2 MG tablet TAKE 1 TABLET BY MOUTH ONCE DAILY (Patient taking differently: Take 2 mg by mouth once daily.)    levothyroxine (EUTHYROX) 25 MCG tablet Take 1 tablet (25 mcg total) by mouth once daily.    nystatin (MYCOSTATIN) ointment SMARTSIG:sparingly Topical 3-4 Times Daily PRN    phentermine 15 MG capsule Take 1 capsule (15 mg total) by mouth every morning.    rosuvastatin (CRESTOR) 10 MG tablet Take 1 tablet (10 mg total) by mouth nightly.    sertraline (ZOLOFT) 100 MG tablet TAKE 1 TABLET BY MOUTH EVERY DAY     Family History       Problem Relation (Age of Onset)    Cancer Mother, Father, Sister    Cataracts Maternal Grandmother    Heart disease Sister, Brother    Hypertension Sister, Brother          Tobacco Use    Smoking status: Never    Smokeless tobacco: Never   Substance and Sexual Activity    Alcohol use: Yes     Alcohol/week: 7.0 - 8.0 standard drinks of alcohol     Types: 4 - 5 Glasses of wine, 3 Cans of beer per week     Comment: Not every week Not  every day    Drug use: No    Sexual activity: Yes     Partners: Male     Birth control/protection: Post-menopausal     Comment: Total hysterectomy 2004     Review of Systems   Constitutional:  Negative for activity change, appetite change, chills, diaphoresis, fatigue and fever.   Respiratory:  Negative for cough, shortness of breath and wheezing.    Cardiovascular:  Negative for chest pain and palpitations.   Gastrointestinal:  Negative for abdominal distention, abdominal pain, constipation, diarrhea, nausea and vomiting.   Genitourinary:  Negative for dysuria, flank pain, frequency, hematuria and urgency.   Musculoskeletal:  Negative for arthralgias, back pain, gait problem, joint swelling, myalgias, neck pain and neck stiffness.   Skin:  Negative for color change and pallor.   Neurological:  Positive for dizziness. Negative for syncope, weakness, light-headedness, numbness and headaches.   Psychiatric/Behavioral:  Negative for agitation and confusion.      Objective:     Vital Signs (Most Recent):  Temp: 97.8 °F (36.6 °C) (08/21/24 0604)  Pulse: (!) 45 (08/21/24 0604)  Resp: 18 (08/21/24 0604)  BP: 124/67 (08/21/24 0604)  SpO2: 95 % (08/21/24 0604) Vital Signs (24h Range):  Temp:  [97.7 °F (36.5 °C)-98.7 °F (37.1 °C)] 97.8 °F (36.6 °C)  Pulse:  [45-63] 45  Resp:  [16-18] 18  SpO2:  [95 %-99 %] 95 %  BP: ()/(60-79) 124/67     Weight: 86.6 kg (190 lb 14.7 oz)  Body mass index is 29.9 kg/m².     Physical Exam  Vitals and nursing note reviewed.   Constitutional:       General: She is not in acute distress.     Appearance: Normal appearance. She is well-developed and normal weight. She is not ill-appearing, toxic-appearing or diaphoretic.   HENT:      Head: Normocephalic and atraumatic.   Eyes:      General: No scleral icterus.        Right eye: No discharge.         Left eye: No discharge.      Conjunctiva/sclera: Conjunctivae normal.   Neck:      Trachea: No tracheal deviation.   Cardiovascular:      Rate and  "Rhythm: Normal rate and regular rhythm.      Heart sounds: Normal heart sounds. No murmur heard.     No gallop.   Pulmonary:      Effort: Pulmonary effort is normal. No respiratory distress.      Breath sounds: Normal breath sounds. No stridor. No wheezing or rales.   Abdominal:      General: Bowel sounds are normal. There is no distension.      Palpations: Abdomen is soft. There is no mass.      Tenderness: There is no abdominal tenderness. There is no guarding.   Musculoskeletal:         General: No deformity. Normal range of motion.      Cervical back: Normal range of motion and neck supple.   Skin:     General: Skin is warm and dry.      Coloration: Skin is not pale.      Findings: No erythema or rash.   Neurological:      General: No focal deficit present.      Mental Status: She is alert and oriented to person, place, and time.      Cranial Nerves: No cranial nerve deficit.      Motor: No abnormal muscle tone.   Psychiatric:         Mood and Affect: Mood normal.         Behavior: Behavior normal.         Thought Content: Thought content normal.         Judgment: Judgment normal.                Significant Labs: All pertinent labs within the past 24 hours have been reviewed.  BMP:   Recent Labs   Lab 08/20/24  2245   GLU 85      K 3.9      CO2 19*   BUN 17   CREATININE 0.9   CALCIUM 9.4   MG 2.1     CBC:   Recent Labs   Lab 08/20/24  2245   WBC 6.99   HGB 13.3   HCT 39.7        CMP:   Recent Labs   Lab 08/20/24  2245      K 3.9      CO2 19*   GLU 85   BUN 17   CREATININE 0.9   CALCIUM 9.4   PROT 6.9   ALBUMIN 3.6   BILITOT 0.3   ALKPHOS 79   AST 27   ALT 30   ANIONGAP 10     Urine Culture: No results for input(s): "LABURIN" in the last 48 hours.  Urine Studies:   Recent Labs   Lab 08/21/24  0540   COLORU Colorless*   APPEARANCEUA Clear   PHUR 5.0   SPECGRAV 1.010   PROTEINUA Negative   GLUCUA Negative   KETONESU Negative   BILIRUBINUA Negative   OCCULTUA Negative   NITRITE " Negative   LEUKOCYTESUR Trace*   RBCUA 0   WBCUA 1   BACTERIA Rare   SQUAMEPITHEL 1       Significant Imaging: I have reviewed all pertinent imaging results/findings within the past 24 hours.  Imaging Results              CT Head Without Contrast (Final result)  Result time 08/21/24 04:55:16      Final result by Alphonso Reyes MD (08/21/24 04:55:16)                   Impression:      Head CT:    No acute intracranial abnormality.    Sequela of chronic microvascular ischemic change.     images: Grade 1 C2-C3 anterolisthesis; age or etiology not determined on this scan.  Correlate clinically.    Electronically signed by resident: Tom Jean Baptiste  Date:    08/21/2024  Time:    03:09    Electronically signed by: Alphonso Reyes  Date:    08/21/2024  Time:    04:55               Narrative:    EXAMINATION:  CT HEAD WITHOUT CONTRAST    CLINICAL HISTORY:  Dizziness, persistent/recurrent, cardiac or vascular cause suspected;Mental status change, unknown cause;    TECHNIQUE:  Low dose axial CT images obtained throughout the head without the use of intravenous contrast.  Axial, sagittal and coronal reconstructions were performed.    COMPARISON:  MRI 08/21/2024    FINDINGS:  INTRACRANIAL COMPARTMENT:    Ventricles and sulci are normal in size for age without evidence of hydrocephalus.    Mild patchy hypoattenuation in the supratentorial white matter, nonspecific but most likely reflecting chronic microvascular ischemic changes.    No parenchymal mass, hemorrhage, edema or major vascular distribution infarct.    No extra-axial blood or fluid collections.    SKULL/EXTRACRANIAL CONTENTS (limited evaluation):    No depressed calvarial fracture.   Mastoid air cells and paranasal sinuses are essentially clear.     images: Grade 1 C2-C3 anterolisthesis; age or etiology not determined on this scan.                                       MRI Brain Without Contrast (Final result)  Result time 08/21/24 02:36:00      Final  result by Sasha Fitzgerald MD (08/21/24 02:36:00)                   Impression:      Noncontrast MRI demonstrates no acute intracranial abnormality.  Specifically, no evidence of acute infarct.    T2/FLAIR hyperintensity in the supratentorial white matter, nonspecific but likely reflecting sequela of chronic microvascular ischemic change.      Electronically signed by: Sasha Fitzgerald MD  Date:    08/21/2024  Time:    02:36               Narrative:    EXAMINATION:  MRI BRAIN WITHOUT CONTRAST    CLINICAL HISTORY:  Dizziness, persistent/recurrent, cardiac or vascular cause suspected;    TECHNIQUE:  Multiplanar multisequence MR imaging of the brain was performed without contrast.    COMPARISON:  None.    FINDINGS:  There is no abnormal restricted diffusion to suggest acute infarction. The ventricles are normal in size and configuration for age without evidence for hydrocephalus.  There is mild generalized cerebral volume loss.  There is scattered T2/FLAIR hyperintensity within the supratentorial white matter, nonspecific but likely reflecting sequela of chronic microvascular ischemic change.  No evidence of acute parenchymal hemorrhage.  Small focus of remote hemosiderin deposition in the paramedian left frontal lobe.  No extra-axial hemorrhage or fluid collections. The craniocervical junction and sellar regions are within normal limits.

## 2024-08-21 NOTE — PROVIDER PROGRESS NOTES - EMERGENCY DEPT.
Encounter Date: 8/20/2024    ED Physician Progress Notes        Physician Note:   1:00 AM  Patient received in sign-out from Dr. Major pending MRI, CT, labs    Briefly, 74-year-old female presenting with persistent dizziness now having difficulty ambulating or performing ADLs.  Symptoms have been progressively worse over the past 3 weeks.  Denies any infectious symptoms.    Labs overall reassuring with no leukocytosis.  Hemoglobin stable.  UA negative.  No obvious infectious process.  MRI negative for acute stroke.    Given her persistent symptoms, plan to place in observation under hospital medicine for continued workup.    Notified patient with plan, she expressed understanding.  Questions answered.    ALEXANDER Phillips MD  Staff ED Physician  08/21/2024 3:58 AM

## 2024-08-21 NOTE — CARE UPDATE
Hospital Medicine   Care Update Note      Seen on rounds this morning, 08/21/2024.  Endorses dizziness with a spinning sensation.  Occurs all the time. Has sinus pressure.  Doesn't have response to meclizine and doesn't want to try benzos right now.  ENT consulted.  Hold Atenolol given HR 40s which could be contributing to her symptoms.  Discussed with ENT.  Chart review shows she was diagnosed with vestibular migraines by Dr. Ferrera in the past, and that she needs ENT outpatient for [facility].      Will trial Neurology vestibular migraine cocktail inpatient:  1/2 NS at 100cc/hour continuous  Toradol 30mg IV x 1  Benadryl 25mg IV q8h  Compazine 5mg IV q8h  MgSO4 1000mg IV q8h  Valproate 500mg IV q8h  Solumedrol 125mg IV q8h      Vitals:    08/21/24 1026   BP: 117/61   Pulse: (!) 44   Resp: 18   Temp: 97.9 °F (36.6 °C)       Recent Labs   Lab 08/20/24  2245   WBC 6.99   RBC 4.19   HGB 13.3   HCT 39.7      MCV 95   MCH 31.7*   MCHC 33.5       Recent Labs   Lab 08/20/24  2245   CALCIUM 9.4   ALBUMIN 3.6   PROT 6.9      K 3.9   CO2 19*      BUN 17   CREATININE 0.9   ALKPHOS 79   ALT 30   AST 27   BILITOT 0.3       Dianna Sorenson MD, [facility]  Senior Physician  [facility]

## 2024-08-21 NOTE — ED PROVIDER NOTES
Encounter Date: 8/20/2024       History     Chief Complaint   Patient presents with    Dizziness     For 3 wks; hx of orthostatic hypotension     Pt is a 74-year-old female with past medical history of arthritis, anxiety, high cholesterol who presents with dizziness and malaise for the past 3 weeks.  Patient states her entire life she has had issues with dizziness even as a child she could not ride the carousel. She notes in the 1990's she was evaluated for this and at the time she does not recall being given a specific diagnosis.  She notes that usually once a year she will have an issue with the dizziness which is not really the room spinning but a sensation in her brain.  However 3 weeks ago she had a flash of light in her vision and her dizzy episode began. She notes it normally resolves but this time it has persisted. She has not been able to sleep, eat.   She went to  prior to coming to the ED and was referred to the ED for hypotension SBP of 70 and 90 that went to 100 with standing.  I spoke with her daughter Yuval James [phone] who is a physician in [place] and she tells me that her brother called her very worried about her mother as she appeared very ill at home and has not been able to carry out her normal life activities. She is normally very active, drives, etc. She tells that she has noticed memory issues over the past one year. She notes that her mother also takes over the counter supplements and that last time she visited she noticed her mother had a prescription for phentermine despite not being obese.    The history is provided by the patient and medical records.     Review of patient's allergies indicates:   Allergen Reactions    Penicillins Other (See Comments)     Other reaction(s): Unknown     Past Medical History:   Diagnosis Date    AK (actinic keratosis)     Allergy     Seasonal    Anxiety     Arthritis of both knees 04/22/2015    Basal cell carcinoma     Basal cell carcinoma  10/06/2021    Right upper back    Cataract     Depression     Dizziness     Fibrocystic breast     Fracture of lateral malleolus     High cholesterol     Hypothyroidism due to acquired atrophy of thyroid     Nuclear sclerosis - Both Eyes 05/06/2013    Osteoarthritis 2012    [facility]    SVT (supraventricular tachycardia)      Past Surgical History:   Procedure Laterality Date    ANKLE FRACTURE SURGERY  2013    Taiwo Macias MD    BREAST BIOPSY Left     core bx, b9    CATARACT EXTRACTION W/  INTRAOCULAR LENS IMPLANT Right 12/19/2019    Procedure: EXTRACTION, CATARACT, WITH IOL INSERTION;  Surgeon: Aleyda Acosta MD;  Location: [facility] OR;  Service: Ophthalmology;  Laterality: Right;  LASER ASSISTED    CATARACT EXTRACTION W/  INTRAOCULAR LENS IMPLANT Left 1/16/2020    Procedure: EXTRACTION, CATARACT, WITH IOL INSERTION;  Surgeon: Aleyda Acosta MD;  Location: [facility] OR;  Service: Ophthalmology;  Laterality: Left;    COLONOSCOPY  08/21/03    COLONOSCOPY N/A 5/6/2021    Procedure: COLONOSCOPY;  Surgeon: Lon Chapin MD;  Location: [facility] ENDO;  Service: Endoscopy;  Laterality: N/A;    ELBOW FRACTURE SURGERY  2004    Lang Daniels MD    ELBOW SURGERY  2004    Lang Daniels MD    EYE SURGERY  Cataract    FRACTURE SURGERY  Left ankle/ leg- left elbow/arm    fx arm      HUMERUS FRACTURE SURGERY  2004    Lang Daniels MD    HYSTERECTOMY       Family History   Problem Relation Name Age of Onset    Cancer Mother Jennifer MONTES DE OCA Orin         lung cancer    Cancer Father Cameron HOLLOWAY Orin         renal cancer    Heart disease Sister Leilani De Jesus     Hypertension Sister Leilani De Jesus     Heart disease Brother Cameron Kelly Orin Velasco     Hypertension Brother Cameron Kelly Orin Velasco     Cataracts Maternal Grandmother      Cancer Sister Polina Sr     Melanoma Neg Hx      Skin cancer Neg Hx      Amblyopia Neg Hx      Blindness Neg Hx      Diabetes Neg Hx      Glaucoma Neg Hx      Macular degeneration Neg Hx      Retinal  detachment Neg Hx      Strabismus Neg Hx      Stroke Neg Hx      Thyroid disease Neg Hx       Social History     Tobacco Use    Smoking status: Never    Smokeless tobacco: Never   Substance Use Topics    Alcohol use: Yes     Alcohol/week: 7.0 - 8.0 standard drinks of alcohol     Types: 4 - 5 Glasses of wine, 3 Cans of beer per week     Comment: Not every week Not every day    Drug use: No     Physical Exam     Initial Vitals [08/20/24 2030]   BP Pulse Resp Temp SpO2   109/61 (!) 55 17 98.7 °F (37.1 °C) 96 %      MAP       --         Physical Exam    Nursing note and vitals reviewed.  Constitutional: She appears well-developed and well-nourished. She is not diaphoretic. No distress.   HENT:   Head: Normocephalic and atraumatic.   TM's appear normal bilaterally   Eyes: Conjunctivae and EOM are normal. Pupils are equal, round, and reactive to light. No scleral icterus.   Neck: Neck supple.   Normal range of motion.  Cardiovascular:  Normal rate, regular rhythm and intact distal pulses.           Pulmonary/Chest: No respiratory distress.   Abdominal: Abdomen is soft. She exhibits no distension.   Musculoskeletal:         General: No edema. Normal range of motion.      Cervical back: Normal range of motion and neck supple.     Neurological: She is alert and oriented to person, place, and time. She has normal strength. No cranial nerve deficit or sensory deficit. GCS score is 15. GCS eye subscore is 4. GCS verbal subscore is 5. GCS motor subscore is 6.   No drift of upper or lower extremities  No facial droop  Finger to nose and heel to shin intact   No aphasia or slurred speech   Skin: Skin is warm and dry.   Psychiatric: She has a normal mood and affect. Her behavior is normal. Judgment and thought content normal.         ED Course   Procedures  Labs Reviewed   COMPREHENSIVE METABOLIC PANEL - Abnormal       Result Value    Sodium 139      Potassium 3.9      Chloride 110      CO2 19 (*)     Glucose 85      BUN 17       Creatinine 0.9      Calcium 9.4      Total Protein 6.9      Albumin 3.6      Total Bilirubin 0.3      Alkaline Phosphatase 79      AST 27      ALT 30      eGFR >60.0      Anion Gap 10     CBC W/ AUTO DIFFERENTIAL - Abnormal    WBC 6.99      RBC 4.19      Hemoglobin 13.3      Hematocrit 39.7      MCV 95      MCH 31.7 (*)     MCHC 33.5      RDW 13.0      Platelets 295      MPV 10.5      Immature Granulocytes 0.4      Gran # (ANC) 3.5      Immature Grans (Abs) 0.03      Lymph # 2.4      Mono # 0.8      Eos # 0.2      Baso # 0.06      nRBC 0      Gran % 50.0      Lymph % 34.3      Mono % 11.7      Eosinophil % 2.7      Basophil % 0.9      Differential Method Automated     URINALYSIS, REFLEX TO URINE CULTURE - Abnormal    Specimen UA Urine, Clean Catch      Color, UA Colorless (*)     Appearance, UA Clear      pH, UA 5.0      Specific Gravity, UA 1.010      Protein, UA Negative      Glucose, UA Negative      Ketones, UA Negative      Bilirubin (UA) Negative      Occult Blood UA Negative      Nitrite, UA Negative      Leukocytes, UA Trace (*)     Narrative:     Specimen Source->Urine   URINALYSIS - Abnormal    Specimen UA Urine, Unspecified      Color, UA Yellow      Appearance, UA Clear      pH, UA 6.0      Specific Gravity, UA 1.020      Protein, UA Negative      Glucose, UA Negative      Ketones, UA Negative      Bilirubin (UA) Negative      Occult Blood UA Negative      Nitrite, UA Negative      Leukocytes, UA 1+ (*)    HIV 1 / 2 ANTIBODY    HIV 1/2 Ag/Ab Non-reactive      Narrative:     Release to patient->Immediate   HEPATITIS C ANTIBODY    Hepatitis C Ab Non-reactive      Narrative:     Release to patient->Immediate   TSH    TSH 3.921     MAGNESIUM    Magnesium 2.1     DRUG SCREEN PANEL, URINE EMERGENCY    Benzodiazepines Negative      Methadone metabolites Negative      Cocaine (Metab.) Negative      Opiate Scrn, Ur Negative      Barbiturate Screen, Ur Negative      Amphetamine Screen, Ur Negative      THC  Negative      Phencyclidine Negative      Creatinine, Urine 44.0      Toxicology Information SEE COMMENT      Narrative:     Specimen Source->Urine   DRUG SCREEN PANEL, URINE EMERGENCY    Benzodiazepines Negative      Methadone metabolites Negative      Cocaine (Metab.) Negative      Opiate Scrn, Ur Negative      Barbiturate Screen, Ur Negative      Amphetamine Screen, Ur Negative      THC Negative      Phencyclidine Negative      Creatinine, Urine 147.0      Toxicology Information SEE COMMENT     URINALYSIS MICROSCOPIC    RBC, UA 0      WBC, UA 2      Bacteria Rare      Squam Epithel, UA 1      Ca Oxalate Radha, UA Few      Microscopic Comment SEE COMMENT     URINALYSIS MICROSCOPIC    RBC, UA 0      WBC, UA 1      Bacteria Rare      Squam Epithel, UA 1      Microscopic Comment SEE COMMENT      Narrative:     Specimen Source->Urine     EKG Readings: (Independently Interpreted)   Previous EKG: Compared with most recent EKG Previous EKG Date: 02/10/2021.   EKG done at 8:33 p.m. sinus bradycardia rate of 52 normal axis low-voltage QRS compared with most recent EKG no significant change     ECG Results              EKG 12-lead (Final result)        Collection Time Result Time QRS Duration [facility] QTC Calculation    08/20/24 20:33:36 08/21/24 13:03:28 96 422                     Final result by Interface, Lab In [facility] (08/21/24 13:03:33)                   Narrative:    Test Reason : R42,    Vent. Rate : 052 BPM     Atrial Rate : 052 BPM     P-R Int : 134 ms          QRS Dur : 096 ms      QT Int : 454 ms       P-R-T Axes : 000 032 039 degrees     QTc Int : 422 ms    Sinus bradycardia  Otherwise normal ECG    When compared with ECG of 10-FEB-2021 13:47,  No significant change was found  Confirmed by Mingo Aceves MD ([id number]) on 8/21/2024 1:03:27 PM    Referred By: NASIM   SELF           Confirmed By:Mingo Aceves MD                                  Imaging Results              CT Head Without Contrast (Edited Result - FINAL)   Result time 08/23/24 14:19:12      Addendum (preliminary) 1 of 1 by Alphonso Reeys MD (08/23/24 14:19:12)      Alphonso Reyes MD, first observed the critical findings on 08/21/2024 at approximately 04:48, and sent the results to Paula Phillips MD, via Epic Secure Chat message on 08/21/2024 at 04:54.  Patient name and medical record number were specified/linked in the message.The recipient immediately responded, acknowledging receipt of the information.      Electronically signed by: Alphonso Reyes  Date:    08/23/2024  Time:    14:19                 Final result by Alphonso Reyes MD (08/21/24 04:55:16)                   Impression:      Head CT:    No acute intracranial abnormality.    Sequela of chronic microvascular ischemic change.     images: Grade 1 C2-C3 anterolisthesis; age or etiology not determined on this scan.  Correlate clinically.    Electronically signed by resident: Tom Jean Baptiste  Date:    08/21/2024  Time:    03:09    Electronically signed by: Alphonso Reyes  Date:    08/21/2024  Time:    04:55               Narrative:    EXAMINATION:  CT HEAD WITHOUT CONTRAST    CLINICAL HISTORY:  Dizziness, persistent/recurrent, cardiac or vascular cause suspected;Mental status change, unknown cause;    TECHNIQUE:  Low dose axial CT images obtained throughout the head without the use of intravenous contrast.  Axial, sagittal and coronal reconstructions were performed.    COMPARISON:  MRI 08/21/2024    FINDINGS:  INTRACRANIAL COMPARTMENT:    Ventricles and sulci are normal in size for age without evidence of hydrocephalus.    Mild patchy hypoattenuation in the supratentorial white matter, nonspecific but most likely reflecting chronic microvascular ischemic changes.    No parenchymal mass, hemorrhage, edema or major vascular distribution infarct.    No extra-axial blood or fluid collections.    SKULL/EXTRACRANIAL CONTENTS (limited evaluation):    No depressed calvarial fracture.   Mastoid air cells and  paranasal sinuses are essentially clear.     images: Grade 1 C2-C3 anterolisthesis; age or etiology not determined on this scan.                                       MRI Brain Without Contrast (Final result)  Result time 08/21/24 02:36:00      Final result by Sasha Fitzgerald MD (08/21/24 02:36:00)                   Impression:      Noncontrast MRI demonstrates no acute intracranial abnormality.  Specifically, no evidence of acute infarct.    T2/FLAIR hyperintensity in the supratentorial white matter, nonspecific but likely reflecting sequela of chronic microvascular ischemic change.      Electronically signed by: Sasha Fitzgerald MD  Date:    08/21/2024  Time:    02:36               Narrative:    EXAMINATION:  MRI BRAIN WITHOUT CONTRAST    CLINICAL HISTORY:  Dizziness, persistent/recurrent, cardiac or vascular cause suspected;    TECHNIQUE:  Multiplanar multisequence MR imaging of the brain was performed without contrast.    COMPARISON:  None.    FINDINGS:  There is no abnormal restricted diffusion to suggest acute infarction. The ventricles are normal in size and configuration for age without evidence for hydrocephalus.  There is mild generalized cerebral volume loss.  There is scattered T2/FLAIR hyperintensity within the supratentorial white matter, nonspecific but likely reflecting sequela of chronic microvascular ischemic change.  No evidence of acute parenchymal hemorrhage.  Small focus of remote hemosiderin deposition in the paramedian left frontal lobe.  No extra-axial hemorrhage or fluid collections. The craniocervical junction and sellar regions are within normal limits.                                       Medications   sodium chloride 0.9% bolus 1,000 mL 1,000 mL (0 mLs Intravenous Stopped 8/21/24 0530)   ketorolac injection 30 mg (30 mg Intravenous Given 8/21/24 1998)     Medical Decision Making  DDX: CVA, malignancy, acoustic neuroma, electrolyte abnormality, NPH    Neuro consult, admission   Care  transferred to Dr. Phillips pending MRI, CT, labs    Amount and/or Complexity of Data Reviewed  Labs: ordered.  Radiology: ordered.                                      Clinical Impression:  Final diagnoses:  [R42] Dizziness (Primary)  [G43.809] Vestibular migraine          ED Disposition Condition    Observation Stable                Marlee Major MD  08/30/24 1256       Marlee Major MD  08/30/24 1257

## 2024-08-21 NOTE — ED NOTES
Patient identifiers verified and correct for  Ms De La Torre  C/C:  Lightheaded SEE NN  APPEARANCE: awake and alert in NAD. PAIN  0/10  SKIN: warm, dry and intact. No breakdown or bruising.  MUSCULOSKELETAL: Patient moving all extremities spontaneously, no obvious swelling or deformities noted. Ambulates independently.  RESPIRATORY: Denies shortness of breath.Respirations unlabored.   CARDIAC: Denies CP, 2+ distal pulses; no peripheral edema  ABDOMEN: S/ND/NT, Denies nausea  : voids spontaneously, denies difficulty  Neurologic: AAO x 4; follows commands equal strength in all extremities; denies numbness/tingling. Denies dizzines reports room not spinning, head spinning

## 2024-08-22 VITALS
HEART RATE: 67 BPM | OXYGEN SATURATION: 98 % | WEIGHT: 198.63 LBS | BODY MASS INDEX: 31.18 KG/M2 | HEIGHT: 67 IN | RESPIRATION RATE: 18 BRPM | DIASTOLIC BLOOD PRESSURE: 66 MMHG | SYSTOLIC BLOOD PRESSURE: 133 MMHG | TEMPERATURE: 98 F

## 2024-08-22 PROCEDURE — 96372 THER/PROPH/DIAG INJ SC/IM: CPT | Performed by: PHYSICIAN ASSISTANT

## 2024-08-22 PROCEDURE — 25000003 PHARM REV CODE 250: Performed by: PHYSICIAN ASSISTANT

## 2024-08-22 PROCEDURE — A4216 STERILE WATER/SALINE, 10 ML: HCPCS | Performed by: PHYSICIAN ASSISTANT

## 2024-08-22 PROCEDURE — 63600175 PHARM REV CODE 636 W HCPCS: Performed by: PHYSICIAN ASSISTANT

## 2024-08-22 PROCEDURE — 25000003 PHARM REV CODE 250: Performed by: HOSPITALIST

## 2024-08-22 PROCEDURE — 20600001 HC STEP DOWN PRIVATE ROOM

## 2024-08-22 PROCEDURE — 97161 PT EVAL LOW COMPLEX 20 MIN: CPT

## 2024-08-22 PROCEDURE — 63600175 PHARM REV CODE 636 W HCPCS: Performed by: HOSPITALIST

## 2024-08-22 PROCEDURE — 97116 GAIT TRAINING THERAPY: CPT

## 2024-08-22 RX ORDER — RIBOFLAVIN (VITAMIN B2) 400 MG
400 TABLET ORAL DAILY
Qty: 30 TABLET | Refills: 0 | Status: SHIPPED | OUTPATIENT
Start: 2024-08-22 | End: 2025-08-22

## 2024-08-22 RX ORDER — EPINEPHRINE 0.22MG
100 AEROSOL WITH ADAPTER (ML) INHALATION DAILY
Qty: 30 CAPSULE | Refills: 11 | Status: SHIPPED | OUTPATIENT
Start: 2024-08-22 | End: 2025-08-22

## 2024-08-22 RX ORDER — LANOLIN ALCOHOL/MO/W.PET/CERES
400 CREAM (GRAM) TOPICAL DAILY
Qty: 30 TABLET | Refills: 11 | Status: SHIPPED | OUTPATIENT
Start: 2024-08-22 | End: 2025-08-22

## 2024-08-22 RX ORDER — MECLIZINE HYDROCHLORIDE 25 MG/1
25 TABLET ORAL 3 TIMES DAILY PRN
Qty: 90 TABLET | Refills: 0 | Status: SHIPPED | OUTPATIENT
Start: 2024-08-22 | End: 2024-09-21

## 2024-08-22 RX ADMIN — SODIUM CHLORIDE: 4.5 INJECTION, SOLUTION INTRAVENOUS at 06:08

## 2024-08-22 RX ADMIN — DEXTROSE 500 MG: 50 INJECTION, SOLUTION INTRAVENOUS at 05:08

## 2024-08-22 RX ADMIN — ATORVASTATIN CALCIUM 40 MG: 40 TABLET, FILM COATED ORAL at 07:08

## 2024-08-22 RX ADMIN — Medication 10 ML: at 05:08

## 2024-08-22 RX ADMIN — SODIUM CHLORIDE: 4.5 INJECTION, SOLUTION INTRAVENOUS at 09:08

## 2024-08-22 RX ADMIN — HEPARIN SODIUM 5000 UNITS: 5000 INJECTION INTRAVENOUS; SUBCUTANEOUS at 05:08

## 2024-08-22 RX ADMIN — PROCHLORPERAZINE EDISYLATE 5 MG: 5 INJECTION INTRAMUSCULAR; INTRAVENOUS at 05:08

## 2024-08-22 RX ADMIN — SERTRALINE HYDROCHLORIDE 100 MG: 50 TABLET ORAL at 07:08

## 2024-08-22 RX ADMIN — DIPHENHYDRAMINE HYDROCHLORIDE 25 MG: 50 INJECTION, SOLUTION INTRAMUSCULAR; INTRAVENOUS at 05:08

## 2024-08-22 RX ADMIN — MAGNESIUM SULFATE 1 G: 500 INJECTION, SOLUTION INTRAMUSCULAR; INTRAVENOUS at 06:08

## 2024-08-22 RX ADMIN — METHYLPREDNISOLONE SODIUM SUCCINATE 125 MG: 125 INJECTION, POWDER, FOR SOLUTION INTRAMUSCULAR; INTRAVENOUS at 06:08

## 2024-08-22 RX ADMIN — LEVOTHYROXINE SODIUM 25 MCG: 25 TABLET ORAL at 07:08

## 2024-08-22 NOTE — PT/OT/SLP EVAL
Physical Therapy Evaluation and Discharge Note     Patient Name:  Viv De La Torre  MRN: [id number]    Admit Date: 8/20/2024  Admitting Diagnosis:  Dizziness  Length of Stay: 0 days  Recent Surgery: * No surgery found *      Ambulate 3x/day with nsg per progressive mobility protocol - independent  Recommendations:     Discharge Recommendations: no therapy indicated  Equipment recommendations: none  Barriers to discharge: None Identified     Assessment:     Viv De La Torre is a 74 y.o. female admitted to [facility] on 8/20/2024 with medical diagnosis of Dizziness.     Pt is agreeable to therapy evaluation and session. Pt sitting on couch when therapist entered room. PTA, pt was (I) with ambulation and ADLs. Pt able to stand without assist, ambulate in hallway, and perform toilet transfer independently. Pt states she feels comfortable navigating stairs at home and does not need to practice prior to d/c.   Nsg notified that IV leaking and she arrived at bedside to address. Nsg notified that IV pole is too high to fit under doorways and cannot be adjusted; states she will change it out.    Viv De La Torre does not require acute PT services at this time due to being at (I) PLOF and demonstrating safety with functional mobility, including transfers and ambulation. Once medically stable, recommending pt discharge to no therapy indicated.      Discharge from acute PT services    Plan:   Plan of Care Expires:  08/22/24  Plan of Care reviewed with: patient    This plan of care has been discussed with the patient/caregiver, who was included in its development and is in agreement with the identified goals and treatment plan.     Subjective     Communicated with RN prior to session.  Patient found  sitting on couch  upon PT entry to room, agreeable to evaluation. Pt alone during session.    Chief Complaint: none - states all visual sx have resolved    Patient/Family Comments/goals: none    Pain/Comfort:  Pain Rating 1: 0/10  Pain Rating  Post-Intervention 1: 0/10    Patients cultural, spiritual, Faith conflicts given the current situation: None identified     Patient History: information obtained from pt     Living Environment: Pt lives alone in 2nd floor apartment  with 3 + 16 AIMEE with L HR. Bathroom set-up: tub/shower combo  Prior Level of Function: independent with mobility and ADLs  DME owned: none  Support Available/Caregiver Assistance:  son lives down the street and can assist as needed  Drives: yes  Works: no  Hobbies: tennis  Objective:     Patient found with: peripheral IV, telemetry    Recent Surgery: * No surgery found *    General Precautions: Standard, fall   Orthopedic Precautions:    Braces:     Oxygen Device: room air      Exams:    Cognition:  Alert  Command following: Follows multistep verbal commands  Communication: clear/fluent    Sensation:   Light touch sensation: Intact BLEs    Gross Motor Coordination: No deficits noted during functional mobility tasks     Edema/Skin Integrity: None noted; Visible skin intact    Postural examination/scapula alignment: no deficits noted    Lower Extremity Range of Motion:  Right Lower Extremity: WFL  Left Lower Extremity: WFL    Lower Extremity Strength:  Right Lower Extremity: WFL  Left Lower Extremity: WFL    Functional Mobility:    Bed Mobility:  Not assessed d/t sitting on couch    Transfers:   Sit <> Stand Transfer: Independent x 1 trials from couch with no AD   Independent  x1 trial from toilet with no AD             Gait:  Distance: 220 ft  Assistance level: Independent  Assistive Device: pushed IV pole  Gait Assessment: none noted; gait distance limited d/t IV pole height (hit parts of ceiling)    Balance:  Dynamic Sitting: NORMAL: No deviations seen in posture held dynamically  Standing:  Static: NORMAL: No deviations seen in posture held statically   Dynamic: NORMAL: No deviations seen in posture held dynamically    Outcome Measure: AM-PAC 6 CLICK MOBILITY  Total  Score:24     Patient/Caregiver Education:     Therapist educated pt/caregiver regarding:   PT POC and goals for therapy   Safety with mobility and fall risk   Instruction on use of call button and importance of calling nursing staff for assistance with mobility   Time provided for therapeutic counseling and discussion of current health disposition. All questions answered to satisfaction, within scope of PT practice     Patient/caregiver able to verbalize understanding and expressed no further questions this visit; will follow-up with pt/caregiver during current admit for additional questions/concerns within scope of practice.     White board updated.     Patient left sitting edge of bed with all lines intact, call button in reach, and nsg notified.    GOALS:   Multidisciplinary Problems       Physical Therapy Goals       Not on file              Multidisciplinary Problems (Resolved)          Problem: Physical Therapy    Goal Priority Disciplines Outcome Goal Variances Interventions   Physical Therapy Goal   (Resolved)     PT, PT/OT Met     Description: Patient does not require acute PT services at this time due to being at (I) PLOF and demonstrating safety with functional mobility, including transfers and ambulation                           History:     Past Medical History:   Diagnosis Date    AK (actinic keratosis)     Allergy     Seasonal    Anxiety     Arthritis of both knees 04/22/2015    Basal cell carcinoma     Basal cell carcinoma 10/06/2021    Right upper back    Cataract     Depression     Dizziness     Fibrocystic breast     Fracture of lateral malleolus     High cholesterol     Hypothyroidism due to acquired atrophy of thyroid     Nuclear sclerosis - Both Eyes 05/06/2013    Osteoarthritis 2012    [facility]    SVT (supraventricular tachycardia)        Past Surgical History:   Procedure Laterality Date    ANKLE FRACTURE SURGERY  2013    Taiwo Macias MD    BREAST BIOPSY Left     core bx, b9    CATARACT  EXTRACTION W/  INTRAOCULAR LENS IMPLANT Right 12/19/2019    Procedure: EXTRACTION, CATARACT, WITH IOL INSERTION;  Surgeon: Aleyda Acosta MD;  Location: [facility] OR;  Service: Ophthalmology;  Laterality: Right;  LASER ASSISTED    CATARACT EXTRACTION W/  INTRAOCULAR LENS IMPLANT Left 1/16/2020    Procedure: EXTRACTION, CATARACT, WITH IOL INSERTION;  Surgeon: Aleyda Acosta MD;  Location: [facility] OR;  Service: Ophthalmology;  Laterality: Left;    COLONOSCOPY  08/21/03    COLONOSCOPY N/A 5/6/2021    Procedure: COLONOSCOPY;  Surgeon: Lon Chapin MD;  Location: [facility];  Service: Endoscopy;  Laterality: N/A;    ELBOW FRACTURE SURGERY  2004    Lang Daniels MD    ELBOW SURGERY  2004    Lang Daniels MD    EYE SURGERY  Cataract    FRACTURE SURGERY  Left ankle/ leg- left elbow/arm    fx arm      HUMERUS FRACTURE SURGERY  2004    Lang Daniels MD    HYSTERECTOMY         Time Tracking:     PT Received On: 08/22/24  PT Start Time: 0916     PT Stop Time: 0936  PT Total Time (min): 20 min     Billable Minutes: Evaluation 8 and Gait Training 12 08/22/2024

## 2024-08-22 NOTE — PLAN OF CARE
08/22/24 1308   Final Note   Assessment Type Final Discharge Note   Anticipated Discharge Disposition Home   What phone number can be called within the next 1-3 days to see how you are doing after discharge? [phone]   Hospital Resources/Appts/Education Provided Provided patient/caregiver with written discharge plan information;Provided education on problems/symptoms using teachback;Appointments scheduled and added to AVS   Post-Acute Status   Discharge Delays None known at this time     Patient to discharge home / self care. CHW scheduled follow up PCP appointment. Also has neurology follow up scheduled. No other needs noted at this time.     Marlys Mueller RN    [phone]    Future Appointments   Date Time Provider Department Center   8/30/2024  8:40 AM Aravind Jimenez MD [facility]   8/30/2024  2:00 PM Bharath Colbert MD [facility] NEURO Bhupinder Silva

## 2024-08-22 NOTE — SUBJECTIVE & OBJECTIVE
Medications:  Continuous Infusions:   0.45% NaCl   Intravenous Continuous 100 mL/hr at 08/21/24 1508 New Bag at 08/21/24 1508    lactated ringers   Intravenous Continuous   Stopped at 08/21/24 1900     Scheduled Meds:   atorvastatin  40 mg Oral Daily    diphenhydrAMINE  25 mg Intravenous Q8H    heparin (porcine)  5,000 Units Subcutaneous Q8H    levothyroxine  25 mcg Oral Daily    magnesium sulfate IVPB  1 g Intravenous Q8H    methylPREDNISolone injection (PEDS and ADULTS)  125 mg Intravenous Q8H    prochlorperazine  5 mg Intravenous Q8H    sertraline  100 mg Oral Daily    sodium chloride 0.9%  10 mL Intravenous Q8H    valproate sodium (DEPACON) IVPB  500 mg Intravenous Q8H     PRN Meds:  Current Facility-Administered Medications:     acetaminophen, 650 mg, Oral, Q6H PRN    dextrose 10%, 12.5 g, Intravenous, PRN    dextrose 10%, 25 g, Intravenous, PRN    glucagon (human recombinant), 1 mg, Intramuscular, PRN    glucose, 16 g, Oral, PRN    glucose, 24 g, Oral, PRN    melatonin, 6 mg, Oral, Nightly PRN    naloxone, 0.02 mg, Intravenous, PRN    senna-docusate 8.6-50 mg, 1 tablet, Oral, BID PRN     No current facility-administered medications on file prior to encounter.     Current Outpatient Medications on File Prior to Encounter   Medication Sig    atenoloL (TENORMIN) 100 MG tablet Take 100 mg by mouth once daily.    azelastine (ASTELIN) 137 mcg (0.1 %) nasal spray 2 sprays (274 mcg total) by Nasal route 2 (two) times daily.    bimatoprost (LATISSE) 0.03 % ophthalmic solution Place 1 application  into both eyes every evening. Place one drop on applicator and apply evenly along the skin of the upper eyelid at base of eyelashes once daily at bedtime; repeat procedure for second eye (use a clean applicator). (Patient taking differently: Place one drop on applicator and apply evenly along the skin of the upper eyelid at base of eyelashes once daily at bedtime; repeat procedure for second eye (use a clean applicator).)     estradioL (ESTRACE) 0.01 % (0.1 mg/gram) vaginal cream Place 1 g vaginally once daily.    estradiol (ESTRACE) 2 MG tablet TAKE 1 TABLET BY MOUTH ONCE DAILY (Patient taking differently: Take 2 mg by mouth once daily.)    ibuprofen (ADVIL,MOTRIN) 200 MG tablet Take 800 mg by mouth daily as needed for Pain.    levothyroxine (EUTHYROX) 25 MCG tablet Take 1 tablet (25 mcg total) by mouth once daily.    mupirocin (BACTROBAN) 2 % ointment Apply topically 2 (two) times daily as needed (Rash).    phentermine 15 MG capsule Take 1 capsule (15 mg total) by mouth every morning.    rosuvastatin (CRESTOR) 10 MG tablet Take 1 tablet (10 mg total) by mouth nightly.    sertraline (ZOLOFT) 100 MG tablet TAKE 1 TABLET BY MOUTH EVERY DAY (Patient taking differently: Take 100 mg by mouth once daily.)    albuterol (PROVENTIL/VENTOLIN HFA) 90 mcg/actuation inhaler Inhale 2 puffs into the lungs every 4 (four) hours as needed for Wheezing or Shortness of Breath (cough). Rescue    clobetasol 0.05% (TEMOVATE) 0.05 % Oint Apply topically 2 (two) times daily.    [DISCONTINUED] albuterol (PROAIR HFA) 90 mcg/actuation inhaler Inhale 2 puffs into the lungs every 6 (six) hours as needed for Wheezing.    [DISCONTINUED] atenoloL (TENORMIN) 50 MG tablet Take 1 tablet (50 mg total) by mouth once daily.    [DISCONTINUED] clindamycin (CLEOCIN T) 1 % lotion Apply topically 2 (two) times daily. Apply to scalp BID x 2 weeks.    [DISCONTINUED] clobetasol (TEMOVATE) 0.05 % cream APPLY  CREAM TOPICALLY TO AFFECTED AREA TWICE DAILY    [DISCONTINUED] nystatin (MYCOSTATIN) ointment SMARTSIG:sparingly Topical 3-4 Times Daily PRN       Review of patient's allergies indicates:   Allergen Reactions    Penicillins Other (See Comments)     Other reaction(s): Unknown       Past Medical History:   Diagnosis Date    AK (actinic keratosis)     Allergy     Seasonal    Anxiety     Arthritis of both knees 04/22/2015    Basal cell carcinoma     Basal cell carcinoma 10/06/2021     Right upper back    Cataract     Depression     Dizziness     Fibrocystic breast     Fracture of lateral malleolus     High cholesterol     Hypothyroidism due to acquired atrophy of thyroid     Nuclear sclerosis - Both Eyes 05/06/2013    Osteoarthritis 2012    [facility]    SVT (supraventricular tachycardia)      Past Surgical History:   Procedure Laterality Date    ANKLE FRACTURE SURGERY  2013    Taiwo Macias MD    BREAST BIOPSY Left     core bx, b9    CATARACT EXTRACTION W/  INTRAOCULAR LENS IMPLANT Right 12/19/2019    Procedure: EXTRACTION, CATARACT, WITH IOL INSERTION;  Surgeon: Aleyda Acosta MD;  Location: [facility] OR;  Service: Ophthalmology;  Laterality: Right;  LASER ASSISTED    CATARACT EXTRACTION W/  INTRAOCULAR LENS IMPLANT Left 1/16/2020    Procedure: EXTRACTION, CATARACT, WITH IOL INSERTION;  Surgeon: Aleyda Acosta MD;  Location: [facility] OR;  Service: Ophthalmology;  Laterality: Left;    COLONOSCOPY  08/21/03    COLONOSCOPY N/A 5/6/2021    Procedure: COLONOSCOPY;  Surgeon: Lon Chapin MD;  Location[facility] ENDO;  Service: Endoscopy;  Laterality: N/A;    ELBOW FRACTURE SURGERY  2004    Lang Daniels MD    ELBOW SURGERY  2004    Lang aDniels MD    EYE SURGERY  Cataract    FRACTURE SURGERY  Left ankle/ leg- left elbow/arm    fx arm      HUMERUS FRACTURE SURGERY  2004    Lang Daniels MD    HYSTERECTOMY       Family History       Problem Relation (Age of Onset)    Cancer Mother, Father, Sister    Cataracts Maternal Grandmother    Heart disease Sister, Brother    Hypertension Sister, Brother          Tobacco Use    Smoking status: Never    Smokeless tobacco: Never   Substance and Sexual Activity    Alcohol use: Yes     Alcohol/week: 7.0 - 8.0 standard drinks of alcohol     Types: 4 - 5 Glasses of wine, 3 Cans of beer per week     Comment: Not every week Not every day    Drug use: No    Sexual activity: Yes     Partners: Male     Birth control/protection: Post-menopausal     Comment: Total hysterectomy 2004      Review of Systems   All other systems reviewed and are negative.    Objective:     Vital Signs (Most Recent):  Temp: 98.7 °F (37.1 °C) (08/21/24 1611)  Pulse: (!) 51 (08/21/24 1611)  Resp: 18 (08/21/24 1611)  BP: 136/60 (08/21/24 1611)  SpO2: (!) 94 % (08/21/24 1611) Vital Signs (24h Range):  Temp:  [97.8 °F (36.6 °C)-98.7 °F (37.1 °C)] 98.7 °F (37.1 °C)  Pulse:  [43-56] 51  Resp:  [16-18] 18  SpO2:  [94 %-99 %] 94 %  BP: (108-154)/(60-78) 136/60     Weight: 86.6 kg (190 lb 14.7 oz)  Body mass index is 29.9 kg/m².    Date 08/21/24 0700 - 08/22/24 0659   Shift 3648-4229 8330-9102 7148-9054 24 Hour Total   INTAKE   I.V.(mL/kg)  1000(11.5)  1000(11.5)   Shift Total(mL/kg)  1000(11.5)  1000(11.5)   OUTPUT   Shift Total(mL/kg)       Weight (kg) 86.6 86.6 86.6 86.6        Physical Exam   NAD  PERRL, no nystagmus, normal saccades  EAC patent and TM wnl AU  Oral cavity without masses or lesions  Neck soft, nontender  Felda hallpike negative  No visible tremors, Romberg negative  CN II-XII intact      Significant Labs:  CBC:   Recent Labs   Lab 08/20/24 2245   WBC 6.99   RBC 4.19   HGB 13.3   HCT 39.7      MCV 95   MCH 31.7*   MCHC 33.5     CMP:   Recent Labs   Lab 08/20/24 2245   GLU 85   CALCIUM 9.4   ALBUMIN 3.6   PROT 6.9      K 3.9   CO2 19*      BUN 17   CREATININE 0.9   ALKPHOS 79   ALT 30   AST 27   BILITOT 0.3       Significant Diagnostics:  CT: I have reviewed all pertinent results/findings within the past 24 hours and my personal findings are:  No acute findings     pt s/p fall with contusions to bilat LE, R fingers and RUQ abd.  LE injury c/w contusion, no sign of fx.  check R finger xr r/o fx vs sprain/contusion.  RUQ pain, will check ct, labs r/o liver/ intraabd injury.      update: labs, xrays , ct neg.  pt stable. motrin for pain. fingers splinted

## 2024-08-22 NOTE — ED NOTES
Telemetry Verification   Patient placed on Telemetry Box  Verified with War Room  Box # [id number]   Monitor Tech Bradly   Rate 52   Rhythm Sinus Jared

## 2024-08-22 NOTE — PLAN OF CARE
Patient is ready for discharge. Patient stable alert and oriented. IVs removed. No complaints of pain. Discussed discharge plan. Reviewed medications and side effects, appointments, and answered questions with patient and family. __ RX given to patient...       Problem: Adult Inpatient Plan of Care  Goal: Plan of Care Review  Outcome: Met  Goal: Patient-Specific Goal (Individualized)  Outcome: Met  Goal: Absence of Hospital-Acquired Illness or Injury  Outcome: Met  Goal: Optimal Comfort and Wellbeing  Outcome: Met  Goal: Readiness for Transition of Care  Outcome: Met

## 2024-08-22 NOTE — PLAN OF CARE
Sent message to [facility] in basket for PCP follow up appointment.     Marlys Mueller RN    [phone]

## 2024-08-22 NOTE — DISCHARGE INSTRUCTIONS
For dizziness/ vestibular headache please begin to take:  CoQ 400mg daily   Magnesium 400mg daily   Riboflavin (Vitamin B2) 400mg daily  Can take Ibuprofen 600-800mg or Tylenol 1g when a headache occurs followed by Meclizine 25mg TID prn if headache persists     Follow-up with Neurology appt 8/30

## 2024-08-22 NOTE — PLAN OF CARE
Problem: Physical Therapy  Goal: Physical Therapy Goal  Description: Patient does not require acute PT services at this time due to being at (I) PLOF and demonstrating safety with functional mobility, including transfers and ambulation    Outcome: Met     8/22/2024

## 2024-08-22 NOTE — HPI
"Ms. De La Torre is a 74F w/ pmhx SVT, HLD, alopecia, and nuclear sclerosis who presents for dizziness x 3 weeks.  The patient states this is a chronic problem that happens every few years and normally lasts for 7-10 days. She characterizes the dizziness as a feeling of "drunkenness" worsened by bright light, standing, and walking. She denies other associated symptoms during the episodes. She denies aura with previous episodes, however, prior to the onset of this episode she reports seeing a bright flash in her vision 5 days prior to onset. On chart review, she was evaluated by Dr. Ferrera in 2014 and was diagnosed with vestibular migraines but was then lost to follow up. Since then she has tried dramamine and meclizine without relief.   "

## 2024-08-22 NOTE — ED NOTES
Assumed care of pt at this time. Pt placed on cardiac monitoring at this time. NAD noted. Respirations even and unlabored with visible chest rise and fall noted. Pt resting comfortably in bed with no complaints at this time. Pt instructed to use the call bell for further assistance. Will continue to monitor.

## 2024-08-22 NOTE — CONSULTS
"[facility] - Cardiology Stepdown  Otorhinolaryngology-Head & Neck Surgery  Consult Note    Patient Name: Viv De La Torre  MRN: [id number]  Code Status: Full Code  Admission Date: 8/20/2024  Hospital Length of Stay: 0 days  Attending Physician: Dianna Sorenson MD  Primary Care Provider: Aravind Jimenez MD    Patient information was obtained from patient, past medical records, ER records, and primary team.     Inpatient consult to ENT  Consult performed by: Elmer Odom MD  Consult ordered by: Dianna Sorenson MD        Subjective:     Chief Complaint/Reason for Admission: Dizziness    History of Present Illness: Ms. De La Torre is a 74F w/ pmhx SVT, HLD, alopecia, and nuclear sclerosis who presents for dizziness x 3 weeks.  The patient states this is a chronic problem that happens every few years and normally lasts for 7-10 days. She characterizes the dizziness as a feeling of "drunkenness" worsened by bright light, standing, and walking. She denies other associated symptoms during the episodes. She denies aura with previous episodes, however, prior to the onset of this episode she reports seeing a bright flash in her vision 5 days prior to onset. On chart review, she was evaluated by Dr. Ferrera in 2014 and was diagnosed with vestibular migraines but was then lost to follow up. Since then she has tried dramamine and meclizine without relief.     Medications:  Continuous Infusions:   0.45% NaCl   Intravenous Continuous 100 mL/hr at 08/21/24 1508 New Bag at 08/21/24 1508    lactated ringers   Intravenous Continuous   Stopped at 08/21/24 1900     Scheduled Meds:   atorvastatin  40 mg Oral Daily    diphenhydrAMINE  25 mg Intravenous Q8H    heparin (porcine)  5,000 Units Subcutaneous Q8H    levothyroxine  25 mcg Oral Daily    magnesium sulfate IVPB  1 g Intravenous Q8H    methylPREDNISolone injection (PEDS and ADULTS)  125 mg Intravenous Q8H    prochlorperazine  5 mg Intravenous Q8H    sertraline  100 mg Oral Daily    " sodium chloride 0.9%  10 mL Intravenous Q8H    valproate sodium (DEPACON) IVPB  500 mg Intravenous Q8H     PRN Meds:  Current Facility-Administered Medications:     acetaminophen, 650 mg, Oral, Q6H PRN    dextrose 10%, 12.5 g, Intravenous, PRN    dextrose 10%, 25 g, Intravenous, PRN    glucagon (human recombinant), 1 mg, Intramuscular, PRN    glucose, 16 g, Oral, PRN    glucose, 24 g, Oral, PRN    melatonin, 6 mg, Oral, Nightly PRN    naloxone, 0.02 mg, Intravenous, PRN    senna-docusate 8.6-50 mg, 1 tablet, Oral, BID PRN     No current facility-administered medications on file prior to encounter.     Current Outpatient Medications on File Prior to Encounter   Medication Sig    atenoloL (TENORMIN) 100 MG tablet Take 100 mg by mouth once daily.    azelastine (ASTELIN) 137 mcg (0.1 %) nasal spray 2 sprays (274 mcg total) by Nasal route 2 (two) times daily.    bimatoprost (LATISSE) 0.03 % ophthalmic solution Place 1 application  into both eyes every evening. Place one drop on applicator and apply evenly along the skin of the upper eyelid at base of eyelashes once daily at bedtime; repeat procedure for second eye (use a clean applicator). (Patient taking differently: Place one drop on applicator and apply evenly along the skin of the upper eyelid at base of eyelashes once daily at bedtime; repeat procedure for second eye (use a clean applicator).)    estradioL (ESTRACE) 0.01 % (0.1 mg/gram) vaginal cream Place 1 g vaginally once daily.    estradiol (ESTRACE) 2 MG tablet TAKE 1 TABLET BY MOUTH ONCE DAILY (Patient taking differently: Take 2 mg by mouth once daily.)    ibuprofen (ADVIL,MOTRIN) 200 MG tablet Take 800 mg by mouth daily as needed for Pain.    levothyroxine (EUTHYROX) 25 MCG tablet Take 1 tablet (25 mcg total) by mouth once daily.    mupirocin (BACTROBAN) 2 % ointment Apply topically 2 (two) times daily as needed (Rash).    phentermine 15 MG capsule Take 1 capsule (15 mg total) by mouth every morning.     rosuvastatin (CRESTOR) 10 MG tablet Take 1 tablet (10 mg total) by mouth nightly.    sertraline (ZOLOFT) 100 MG tablet TAKE 1 TABLET BY MOUTH EVERY DAY (Patient taking differently: Take 100 mg by mouth once daily.)    albuterol (PROVENTIL/VENTOLIN HFA) 90 mcg/actuation inhaler Inhale 2 puffs into the lungs every 4 (four) hours as needed for Wheezing or Shortness of Breath (cough). Rescue    clobetasol 0.05% (TEMOVATE) 0.05 % Oint Apply topically 2 (two) times daily.    [DISCONTINUED] albuterol (PROAIR HFA) 90 mcg/actuation inhaler Inhale 2 puffs into the lungs every 6 (six) hours as needed for Wheezing.    [DISCONTINUED] atenoloL (TENORMIN) 50 MG tablet Take 1 tablet (50 mg total) by mouth once daily.    [DISCONTINUED] clindamycin (CLEOCIN T) 1 % lotion Apply topically 2 (two) times daily. Apply to scalp BID x 2 weeks.    [DISCONTINUED] clobetasol (TEMOVATE) 0.05 % cream APPLY  CREAM TOPICALLY TO AFFECTED AREA TWICE DAILY    [DISCONTINUED] nystatin (MYCOSTATIN) ointment SMARTSIG:sparingly Topical 3-4 Times Daily PRN       Review of patient's allergies indicates:   Allergen Reactions    Penicillins Other (See Comments)     Other reaction(s): Unknown       Past Medical History:   Diagnosis Date    AK (actinic keratosis)     Allergy     Seasonal    Anxiety     Arthritis of both knees 04/22/2015    Basal cell carcinoma     Basal cell carcinoma 10/06/2021    Right upper back    Cataract     Depression     Dizziness     Fibrocystic breast     Fracture of lateral malleolus     High cholesterol     Hypothyroidism due to acquired atrophy of thyroid     Nuclear sclerosis - Both Eyes 05/06/2013    Osteoarthritis 2012    [facility]    SVT (supraventricular tachycardia)      Past Surgical History:   Procedure Laterality Date    ANKLE FRACTURE SURGERY  2013    Taiwo Macias MD    BREAST BIOPSY Left     core bx, b9    CATARACT EXTRACTION W/  INTRAOCULAR LENS IMPLANT Right 12/19/2019    Procedure: EXTRACTION, CATARACT, WITH IOL  INSERTION;  Surgeon: Aleyda Acosta MD;  Location: [facility];  Service: Ophthalmology;  Laterality: Right;  LASER ASSISTED    CATARACT EXTRACTION W/  INTRAOCULAR LENS IMPLANT Left 1/16/2020    Procedure: EXTRACTION, CATARACT, WITH IOL INSERTION;  Surgeon: Aleyda Acosta MD;  Location: [facility] OR;  Service: Ophthalmology;  Laterality: Left;    COLONOSCOPY  08/21/03    COLONOSCOPY N/A 5/6/2021    Procedure: COLONOSCOPY;  Surgeon: Lon Chapin MD;  Location: [facility];  Service: Endoscopy;  Laterality: N/A;    ELBOW FRACTURE SURGERY  2004    Lang Daniels MD    ELBOW SURGERY  2004    Lang Daniels MD    EYE SURGERY  Cataract    FRACTURE SURGERY  Left ankle/ leg- left elbow/arm    fx arm      HUMERUS FRACTURE SURGERY  2004    Lang Daniels MD    HYSTERECTOMY       Family History       Problem Relation (Age of Onset)    Cancer Mother, Father, Sister    Cataracts Maternal Grandmother    Heart disease Sister, Brother    Hypertension Sister, Brother          Tobacco Use    Smoking status: Never    Smokeless tobacco: Never   Substance and Sexual Activity    Alcohol use: Yes     Alcohol/week: 7.0 - 8.0 standard drinks of alcohol     Types: 4 - 5 Glasses of wine, 3 Cans of beer per week     Comment: Not every week Not every day    Drug use: No    Sexual activity: Yes     Partners: Male     Birth control/protection: Post-menopausal     Comment: Total hysterectomy 2004     Review of Systems   All other systems reviewed and are negative.    Objective:     Vital Signs (Most Recent):  Temp: 98.7 °F (37.1 °C) (08/21/24 1611)  Pulse: (!) 51 (08/21/24 1611)  Resp: 18 (08/21/24 1611)  BP: 136/60 (08/21/24 1611)  SpO2: (!) 94 % (08/21/24 1611) Vital Signs (24h Range):  Temp:  [97.8 °F (36.6 °C)-98.7 °F (37.1 °C)] 98.7 °F (37.1 °C)  Pulse:  [43-56] 51  Resp:  [16-18] 18  SpO2:  [94 %-99 %] 94 %  BP: (108-154)/(60-78) 136/60     Weight: 86.6 kg (190 lb 14.7 oz)  Body mass index is 29.9 kg/m².    Date 08/21/24 0700 - 08/22/24 0659   Shift  [id number] 6885-8665 1825-0659 24 Hour Total   INTAKE   I.V.(mL/kg)  1000(11.5)  1000(11.5)   Shift Total(mL/kg)  1000(11.5)  1000(11.5)   OUTPUT   Shift Total(mL/kg)       Weight (kg) 86.6 86.6 86.6 86.6        Physical Exam   NAD  PERRL, no nystagmus, normal saccades  EAC patent and TM wnl AU  Oral cavity without masses or lesions  Neck soft, nontender  Prasanna hallpike negative  No visible tremors, Romberg negative  CN II-XII intact      Significant Labs:  CBC:   Recent Labs   Lab 08/20/24  2245   WBC 6.99   RBC 4.19   HGB 13.3   HCT 39.7      MCV 95   MCH 31.7*   MCHC 33.5     CMP:   Recent Labs   Lab 08/20/24  2245   GLU 85   CALCIUM 9.4   ALBUMIN 3.6   PROT 6.9      K 3.9   CO2 19*      BUN 17   CREATININE 0.9   ALKPHOS 79   ALT 30   AST 27   BILITOT 0.3       Significant Diagnostics:  CT: I have reviewed all pertinent results/findings within the past 24 hours and my personal findings are:  No acute findings    Assessment/Plan:     * Dizziness  Ms. De La Torre's has chronic intermittent dizziness with acute exacerbation. Does not meet criteria for peripheral source of dizziness. Would recommend neurology evaluation for central causes such as vestibular migraine    - Consider outpatient referral to neurology  - If unresponsive to planned therapy can consider high-dose steroid taper  - Rest of care per primary        VTE Risk Mitigation (From admission, onward)           Ordered     heparin (porcine) injection 5,000 Units  Every 8 hours         08/21/24 0428     IP VTE HIGH RISK PATIENT  Once         08/21/24 0428     Place sequential compression device  Until discontinued         08/21/24 0428                    Thank you for your consult. I will sign off. Please contact us if you have any additional questions.    Elmer Odom MD  Otorhinolaryngology-Head & Neck Surgery  [facility] - Cardiology Step[facility]

## 2024-08-22 NOTE — ASSESSMENT & PLAN NOTE
Ms. De La Torre's has chronic intermittent dizziness with acute exacerbation. Does not meet criteria for peripheral source of dizziness. Would recommend neurology evaluation for central causes such as vestibular migraine    - Consider outpatient referral to neurology  - If unresponsive to planned therapy can consider high-dose steroid taper  - Rest of care per primary

## 2024-08-22 NOTE — PLAN OF CARE
[facility] - Cardiology Stepdown  Initial Discharge Assessment       Primary Care Provider: Aravind Jimenez MD    Admission Diagnosis: Dizziness [R42]    Admission Date: 8/20/2024  Expected Discharge Date: 8/22/2024    Transition of Care Barriers: None    Payor: MEDICARE / Plan: MEDICARE PART A & B / Product Type: Government /     Extended Emergency Contact Information  Primary Emergency Contact: Jimmie De La Torre  Address: [address] [place] [address] [place]  Mobile Phone: [phone]  Relation: Son  Secondary Emergency Contact: MD Gabriela[facility]   Address: [address] [place] [address] [place]  Mobile Phone: [phone]  Relation: Daughter    Discharge Plan A: Home  Discharge Plan B: Home      [id number] - [place], [place] - [address] [address]  Phone: [phone] Fax: [phone]    [id number] [place], [place] - [address] AT [facility] & [address]  [facility] [address]  [facility] [address]  Phone: [phone] Fax: [phone]    [facility] [address] [place], [place] - [address]  [address]  [facility] [address]  Phone: [phone] Fax: [phone]      Initial Assessment (most recent)       Adult Discharge Assessment - 08/22/24 1159          Discharge Assessment    Assessment Type Discharge Planning Assessment     Confirmed/corrected address, phone number and insurance Yes     Confirmed Demographics Correct on Facesheet     Source of Information patient     Communicated ROXANNE with patient/caregiver Yes     Reason For Admission dizziness     People in Home alone     Facility Arrived From: Home     Do you expect to return to your current living situation? Yes     Do you have help at home or someone to help you manage your care at home? No   stated her son only lives 4 blocks away.     Prior to hospitilization cognitive status: Alert/Oriented     Current cognitive status: Alert/Oriented     Walking or Climbing Stairs Difficulty no     Dressing/Bathing Difficulty no     Home Layout Bathroom on 2nd floor     Equipment Currently Used at Home none     Readmission within 30 days? No     Patient currently being followed by outpatient case management? No     Do you currently have service(s) that help you manage your care at home? No     Do you take prescription medications? Yes     Do you have prescription coverage? Yes     Coverage Medicare A&B and [employer]     Do you have any problems affording any of your prescribed medications? No     Is the patient taking medications as prescribed? yes     Who is going to help you get home at discharge? son     How do you get to doctors appointments? car, drives self;family or friend will provide     Are you on dialysis? No     Do you take coumadin? No     Discharge Plan A Home     Discharge Plan B Home     DME Needed Upon Discharge  none     Discharge Plan discussed with: Patient     Transition of Care Barriers None                      CM met with the patient at the bedside and discussed the discharge plan. Gave her the discharge booklet and placed contact numbers on the white board in the room. Patient alert and sitting up in bed.  Patient verified her name , , PCP, Insurance and Pharmacy . Stated she lives in a double story house with bedroom upstairs and has 16 steps up and good working banisters and has 3 steps to point of entry of house. Stated she is not on coumadin nor is she a dialysis patient nor has she had any home health in the last 6 months. DME's include:none.  Stated she takes  medication as prescribed and has no problems getting  medication.  Her physical address is : [address] [place] [address]. [address].    Discharge Plan A and Plan B have been determined by review of patient's clinical status, future medical and therapeutic needs, and  coverage/benefits for post-acute care in coordination with multidisciplinary team members.    Marlys Mueller RN         [phone]

## 2024-08-22 NOTE — PLAN OF CARE
Future Appointments   Date Time Provider Department Center   8/30/2024  8:40 AM Aravind Jimenez MD [facility]   8/30/2024  2:00 PM Bharath Colbert MD [facility] NEURO [facility]           Team received message regarding patient needing HOSP f/u. Appointment scheduled within a week. Patient placed on waitlist for sooner appointment.      KIKA Gutiérrez  Case Management  [id number]

## 2024-08-23 ENCOUNTER — PATIENT OUTREACH (OUTPATIENT)
Dept: ADMINISTRATIVE | Facility: CLINIC | Age: 74
End: 2024-08-23
Payer: MEDICARE

## 2024-08-23 NOTE — PROGRESS NOTES
C3 nurse attempted to contact Viv De La Torre  for a TCC post hospital discharge follow up call. No answer. Left voicemail with callback information. The patient does not have a scheduled HOSFU appointment. Message sent to PCP staff for assistance with scheduling visit with patient.

## 2024-08-23 NOTE — PROGRESS NOTES
2nd Attempt made to reach patient for TCC call. Left voicemail please call [phone] leave first name, last name, and  Maggi will return your call.

## 2024-08-23 NOTE — DISCHARGE SUMMARY
"[facility] - Cardiology [facility] Medicine  Discharge Summary      Patient Name: Viv De La Torre  MRN: [id number]  MIKE: [id number]  Patient Class: IP- Inpatient  Admission Date: 8/20/2024  Hospital Length of Stay: 1 days  Discharge Date and Time: 8/22/2024  2:25 PM  Attending Physician: No att. providers found   Discharging Provider: Yadi Cadena PA-C  Primary Care Provider: Aravind Jimenez MD  [facility] Medicine Team: [facility] Yadi Cadena PA-C  Primary Care Team: [facility]    HPI:   Ms. Viv De La Torre is a 74 y.o. female, with PMH of Meniere's disease, hypothyroidism, anxiety, depression, HLD, PVCs/SVT, who presented to [facility] ED on 8/20/24 due to worsening dizziness x 3 weeks. She is now having difficulty ambulating and performing her ADLs. She reports associated "blindness" in both eyes and light sensitivity. She states she has been taking ibuprofen for her symptoms.  She was evaluated in the ED with normal labs.  A drug screen was pan negative.  UA showed 1+ glucose readings.  An MRI of the brain showed no acute intracranial abnormalities, there was a hyperintensity noted the supratentorial white matter likely reflecting chronic microvascular ischemic change.  She has had multiple urgent care visits the past 3 weeks [facility] which have noted systolic blood pressures in the 70s.  She was placed on observation.     * No surgery found *      Hospital Course:   Viv De La Torre is a 74 y.o. female admitted to  for dizziness. Vitals and labs remained stable. CTH w/o acute process. MRI Brain showed no acute process. ENT consulted. No acute interventions. PT/OT consulted with no acute needs for therapy services. Will continue with Neuro follow-up on 8/30. Patient educated on new daily medications with added PRNs for headache. Advised to follow-up with Neuro outpatient. Patient is medically ready for discharge. All questions answered at bedside with verbal understanding. Return precautions " given.     Physical Exam  Gen: in NAD, appears stated age  Neuro: AAOx4, CN2-12 grossly intact BL  HEENT: NTNC, EOMI, PERRLA, MMM  CVS: RRR, no m/r/g  Resp: lungs CTAB, no w/r/r; no belabored breathing or accessory muscle use appreciated   Extrem: pulses full, equal, and regular over all 4 extremities; no UE or LE edema BL       Goals of Care Treatment Preferences:  Code Status: Full Code         Consults:   Consults (From admission, onward)          Status Ordering Provider     Inpatient consult to ENT  Once        Provider:  (Not yet assigned)    Completed BRITTNEY REDDING            Final Active Diagnoses:    Diagnosis Date Noted POA    PRINCIPAL PROBLEM:  Dizziness [R42] 04/18/2023 Yes    Vestibular migraine [G43.809] 08/21/2024 Yes    Hypothyroidism due to acquired atrophy of thyroid [E03.4] 05/16/2017 Yes    Hypercholesterolemia [E78.00] 09/18/2012 Yes    Meniere disease [H81.09] 09/18/2012 Yes      Problems Resolved During this Admission:       Discharged Condition: stable    Disposition: Home or Self Care    Patient Instructions:      Ambulatory referral/consult to ENT   Standing Status: Future   Referral Priority: Routine Referral Type: Consultation   Referral Reason: Specialty Services Required   Requested Specialty: Otolaryngology   Number of Visits Requested: 1       Medications:  Reconciled Home Medications:      Medication List        START taking these medications      coenzyme Q10 100 mg capsule  Take 1 capsule (100 mg total) by mouth once daily.     magnesium oxide 400 mg (241.3 mg magnesium) tablet  Commonly known as: MAG-OX  Take 1 tablet (400 mg total) by mouth once daily.     meclizine 25 mg tablet  Commonly known as: ANTIVERT  Take 1 tablet (25 mg total) by mouth 3 (three) times daily as needed for Dizziness.     riboflavin (vitamin B2) 400 mg Tab  Take 400 mg by mouth once daily.            CHANGE how you take these medications      * estradioL 2 MG tablet  Commonly known as: ESTRACE  TAKE 1  TABLET BY MOUTH ONCE DAILY  What changed: when to take this     * estradioL 0.01 % (0.1 mg/gram) vaginal cream  Commonly known as: ESTRACE  Place 1 g vaginally once daily.  What changed: Another medication with the same name was changed. Make sure you understand how and when to take each.     sertraline 100 MG tablet  Commonly known as: ZOLOFT  TAKE 1 TABLET BY MOUTH EVERY DAY  What changed: when to take this           * This list has 2 medication(s) that are the same as other medications prescribed for you. Read the directions carefully, and ask your doctor or other care provider to review them with you.                CONTINUE taking these medications      albuterol 90 mcg/actuation inhaler  Commonly known as: PROVENTIL/VENTOLIN HFA  Inhale 2 puffs into the lungs every 4 (four) hours as needed for Wheezing or Shortness of Breath (cough). Rescue     atenoloL 100 MG tablet  Commonly known as: TENORMIN  Take 100 mg by mouth once daily.     azelastine 137 mcg (0.1 %) nasal spray  Commonly known as: ASTELIN  2 sprays (274 mcg total) by Nasal route 2 (two) times daily.     bimatoprost 0.03 % ophthalmic solution  Commonly known as: LATISSE  Place 1 application  into both eyes every evening. Place one drop on applicator and apply evenly along the skin of the upper eyelid at base of eyelashes once daily at bedtime; repeat procedure for second eye (use a clean applicator).     clobetasol 0.05% 0.05 % Oint  Commonly known as: TEMOVATE  Apply topically 2 (two) times daily.     ibuprofen 200 MG tablet  Commonly known as: ADVIL,MOTRIN  Take 800 mg by mouth daily as needed for Pain.     levothyroxine 25 MCG tablet  Commonly known as: EUTHYROX  Take 1 tablet (25 mcg total) by mouth once daily.     mupirocin 2 % ointment  Commonly known as: BACTROBAN  Apply topically 2 (two) times daily as needed (Rash).     phentermine 15 MG capsule  Take 1 capsule (15 mg total) by mouth every morning.     rosuvastatin 10 MG tablet  Commonly  known as: CRESTOR  Take 1 tablet (10 mg total) by mouth nightly.              Indwelling Lines/Drains at time of discharge:   Lines/Drains/Airways       Airway  Duration                  Airway - Non-Surgical 12/19/19 1329 Nasal Cannula 1708 days                    Time spent on the discharge of patient: 36 minutes         Yadi Cadena PA-C  Department of Hospital Medicine  [facility] - Cardiology Stepdown

## 2024-08-23 NOTE — HOSPITAL COURSE
Viv De La Torre is a 74 y.o. female admitted to  for dizziness. Vitals and labs remained stable. CTH w/o acute process. MRI Brain showed no acute process. ENT consulted. No acute interventions. PT/OT consulted with no acute needs for therapy services. Will continue with Neuro follow-up on 8/30. Patient educated on new daily medications with added PRNs for headache. Advised to follow-up with Neuro outpatient. Patient is medically ready for discharge. All questions answered at bedside with verbal understanding. Return precautions given.     Physical Exam  Gen: in NAD, appears stated age  Neuro: AAOx4, CN2-12 grossly intact BL  HEENT: NTNC, EOMI, PERRLA, MMM  CVS: RRR, no m/r/g  Resp: lungs CTAB, no w/r/r; no belabored breathing or accessory muscle use appreciated   Extrem: pulses full, equal, and regular over all 4 extremities; no UE or LE edema BL

## 2024-08-23 NOTE — TELEPHONE ENCOUNTER
Spoke with patient trying to schedule a hospital follow up with Dr. Jimenez.  Patient stated she don't want to see Dr. Jimenez.  She canceled her appointment for 8/23/24.  She want to see an ENT.

## 2024-08-26 NOTE — PROGRESS NOTES
3rd Attempt made to reach patient for TCC call. Left voicemail please call [phone] leave first name, last name, and  Maggi will return your call.

## 2024-08-29 ENCOUNTER — TELEPHONE (OUTPATIENT)
Dept: ADMINISTRATIVE | Facility: CLINIC | Age: 74
End: 2024-08-29
Payer: MEDICARE

## 2024-08-29 NOTE — TELEPHONE ENCOUNTER
Returned call to patient--pt had questions about what time of day she should take her medications. All questions answered to patient's satisfaction and was very appreciative of call.

## 2024-08-29 NOTE — TELEPHONE ENCOUNTER
Called pt in reference to PD tracker day #3 query. No answer, left message on voicemail to return call to [phone] with callback info.

## 2024-08-30 ENCOUNTER — OFFICE VISIT (OUTPATIENT)
Dept: NEUROLOGY | Facility: CLINIC | Age: 74
End: 2024-08-30
Payer: MEDICARE

## 2024-08-30 VITALS — DIASTOLIC BLOOD PRESSURE: 74 MMHG | HEART RATE: 52 BPM | SYSTOLIC BLOOD PRESSURE: 125 MMHG

## 2024-08-30 DIAGNOSIS — R29.818 AURA: ICD-10-CM

## 2024-08-30 DIAGNOSIS — G43.809 VESTIBULAR MIGRAINE: Primary | ICD-10-CM

## 2024-08-30 PROCEDURE — 99999 PR PBB SHADOW E&M-EST. PATIENT-LVL III: CPT | Mod: PBBFAC,GC,,

## 2024-08-30 PROCEDURE — 99213 OFFICE O/P EST LOW 20 MIN: CPT | Mod: PBBFAC

## 2024-08-30 RX ORDER — UBROGEPANT 50 MG/1
50 TABLET ORAL
Qty: 12 TABLET | Refills: 1 | Status: SHIPPED | OUTPATIENT
Start: 2024-08-30

## 2024-08-30 NOTE — PROGRESS NOTES
[facility] - NEUROLOGY 7TH [place] [facility], [place]    Date: 8/30/24  Patient Name: Viv De La Torre   MRN: [id number]   PCP: Aravind Jimenez  Referring Provider: No ref. provider found    Assessment:   Viv De La Torre is a 74 y.o. female presenting after hospital admission for dizziness. She had roughly 12 dizzy spells this year and the latest one leading to admission lasted for 3.5 weeks. During the episode she also experienced a bright flashing light in the right eye and associated blindness; no headache was related with this event. She experienced 2 severe migraines many years ago that caused weakness of the right side of her body. Her history and presentation are suspicious for remote history of hemiplegic migraine, current acephalgic migraine with aura, and a vestibular component, I.e vestibular migraine. Recommend treating attacks with abortive headache medications and continue preventative medications. She would also benefit from vestibular therapy in the interim.     Plan:     - Start Ubrelvy 50 mg prn in the prodromal period when dizzy spells start   - Continue magnesium 400 mg, coQ, riboflavin  - Add melatonin nightly   - Vestibular therapy   - RTC in 4 months     Problem List Items Addressed This Visit          Neuro    Vestibular migraine - Primary    Relevant Orders    Ambulatory referral/consult to Physical/Occupational Therapy    Ambulatory referral/consult to Internal Medicine     Other Visit Diagnoses       Aura                Bharath Colbert MD    Patient note was created using MModal Dictation.  Any errors in syntax or even information may not have been identified and edited on initial review prior to signing this note.  Subjective:   Patient seen in consultation at the request of No ref. provider found for the evaluation of dizziness. A copy of this note will be sent to the referring physician.        HPI: 8/30/2024    Ms. Viv De La Torre is a 74 y.o. female with history  "of hypothyroidism, hld, anxiety, depression, SVT, alopecia, dizziness, and basal cell carcinoma  presenting after hospital follow up for dizziness. She presented 8/20/2024 with 3.5 weeks of dizziness that would not terminate. She denied any nausea or vomiting but the sensation of spinning "inside my head" when the eyes are closed. Supposedly she has experienced these dizzy spells for as long as she remembers as a child. During this most recent attack she noticed a bright flashing light in her right eye and temporary blindness. This also occurred while driving causing her to pull over. Of note, she experienced 2 migraine headaches many years ago that resulted in weakness on one side of her body. She did not have a headache during this episode. Stroke work up was negative during admission. MRI brain did not reveal acute structural finding in the bilateral hemispheres or cerebellum. She tried meclizine but that did not help her. Over the course of this year she had approximately 12 dizzy spells and this one was the worst. She denies other associated symptoms at this time and is adherent with medications.        PAST MEDICAL HISTORY:  Past Medical History:   Diagnosis Date    AK (actinic keratosis)     Allergy     Seasonal    Anxiety     Arthritis of both knees 04/22/2015    Basal cell carcinoma     Basal cell carcinoma 10/06/2021    Right upper back    Cataract     Depression     Dizziness     Fibrocystic breast     Fracture of lateral malleolus     High cholesterol     Hypothyroidism due to acquired atrophy of thyroid     Nuclear sclerosis - Both Eyes 05/06/2013    Osteoarthritis 2012    [facility]    SVT (supraventricular tachycardia)        PAST SURGICAL HISTORY:  Past Surgical History:   Procedure Laterality Date    ANKLE FRACTURE SURGERY  2013    Taiwo Macias MD    BREAST BIOPSY Left     core bx, b9    CATARACT EXTRACTION W/  INTRAOCULAR LENS IMPLANT Right 12/19/2019    Procedure: EXTRACTION, CATARACT, WITH IOL " X Size Of Lesion In Cm (Optional): 0 INSERTION;  Surgeon: Aleyda Acosta MD;  [place] [facility] [place] [facility] [place];  Service: Ophthalmology;  Laterality: Right;  LASER ASSISTED    CATARACT EXTRACTION W/  INTRAOCULAR LENS IMPLANT Left 1/16/2020    Procedure: EXTRACTION, CATARACT, WITH IOL INSERTION;  Surgeon: Aleyda Acosta MD;  [place] [facility] [place] [facility] [place];  Service: Ophthalmology;  Laterality: Left;    COLONOSCOPY  08/21/03    COLONOSCOPY N/A 5/6/2021    Procedure: COLONOSCOPY;  Surgeon: Lon Chapin MD;  Location: [facility] ENDO;  Service: Endoscopy;  Laterality: N/A;    ELBOW FRACTURE SURGERY  2004    Lang Daniels MD    ELBOW SURGERY  2004    Lang Daniels MD    EYE SURGERY  Cataract    FRACTURE SURGERY  Left ankle/ leg- left elbow/arm    fx arm      HUMERUS FRACTURE SURGERY  2004    Lang Daniels MD    HYSTERECTOMY         CURRENT MEDS:  Current Outpatient Medications   Medication Sig Dispense Refill    albuterol (PROVENTIL/VENTOLIN HFA) 90 mcg/actuation inhaler Inhale 2 puffs into the lungs every 4 (four) hours as needed for Wheezing or Shortness of Breath (cough). Rescue 18 g 0    atenoloL (TENORMIN) 100 MG tablet Take 100 mg by mouth once daily.      azelastine (ASTELIN) 137 mcg (0.1 %) nasal spray 2 sprays (274 mcg total) by Nasal route 2 (two) times daily. 30 mL 1    bimatoprost (LATISSE) 0.03 % ophthalmic solution Place 1 application  into both eyes every evening. Place one drop on applicator and apply evenly along the skin of the upper eyelid at base of eyelashes once daily at bedtime; repeat procedure for second eye (use a clean applicator). (Patient taking differently: Place one drop on applicator and apply evenly along the skin of the upper eyelid at base of eyelashes once daily at bedtime; repeat procedure for second eye (use a clean applicator).) 5 mL 12    clobetasol 0.05% (TEMOVATE) 0.05 % Oint Apply topically 2 (two) times daily.      coenzyme Q10 100 mg capsule Take 1 capsule (100 mg total) by mouth once daily. 30 capsule 11    estradioL (ESTRACE) 0.01 % (0.1 mg/gram)  Include Z78.9 (Other Specified Conditions Influencing Health Status) As An Associated Diagnosis?: No Consent: The risks of atrophy were reviewed with the patient. vaginal cream Place 1 g vaginally once daily.      estradiol (ESTRACE) 2 MG tablet TAKE 1 TABLET BY MOUTH ONCE DAILY (Patient taking differently: Take 2 mg by mouth once daily.) 90 tablet 3    ibuprofen (ADVIL,MOTRIN) 200 MG tablet Take 800 mg by mouth daily as needed for Pain.      levothyroxine (EUTHYROX) 25 MCG tablet Take 1 tablet (25 mcg total) by mouth once daily. 90 tablet 1    magnesium oxide (MAG-OX) 400 mg (241.3 mg magnesium) tablet Take 1 tablet (400 mg total) by mouth once daily. 30 tablet 11    meclizine (ANTIVERT) 25 mg tablet Take 1 tablet (25 mg total) by mouth 3 (three) times daily as needed for Dizziness. 90 tablet 0    mupirocin (BACTROBAN) 2 % ointment Apply topically 2 (two) times daily as needed (Rash).      phentermine 15 MG capsule Take 1 capsule (15 mg total) by mouth every morning. 30 capsule 2    riboflavin, vitamin B2, 400 mg Tab Take 400 mg by mouth once daily. 30 tablet 0    rosuvastatin (CRESTOR) 10 MG tablet Take 1 tablet (10 mg total) by mouth nightly. 90 tablet 3    sertraline (ZOLOFT) 100 MG tablet TAKE 1 TABLET BY MOUTH EVERY DAY 90 tablet 2     No current facility-administered medications for this visit.       ALLERGIES:  Review of patient's allergies indicates:   Allergen Reactions    Penicillins Other (See Comments)     Other reaction(s): Unknown       FAMILY HISTORY:  Family History   Problem Relation Name Age of Onset    Cancer Mother Jennifer MONTES DE OCA Orin         lung cancer    Cancer Father Cameron HOLLOWAY Orin         renal cancer    Heart disease Sister Leilani De Jesus     Hypertension Sister Leilani Sr Neal     Heart disease Brother Cameron Kelly Orin      Hypertension Brother Cameron Kelly Orin      Cataracts Maternal Grandmother      Cancer Sister Polina Sr     Melanoma Neg Hx      Skin cancer Neg Hx      Amblyopia Neg Hx      Blindness Neg Hx      Diabetes Neg Hx      Glaucoma Neg Hx      Macular degeneration Neg Hx      Retinal detachment Neg Hx       Strabismus Neg Hx      Stroke Neg Hx      Thyroid disease Neg Hx         SOCIAL HISTORY:  Social History     Tobacco Use    Smoking status: Never    Smokeless tobacco: Never   Substance Use Topics    Alcohol use: Yes     Alcohol/week: 7.0 - 8.0 standard drinks of alcohol     Types: 4 - 5 Glasses of wine, 3 Cans of beer per week     Comment: Not every week Not every day    Drug use: No       Review of Systems:  12 system review of systems is negative except for the symptoms mentioned in HPI.      Objective:   There were no vitals filed for this visit.  General: NAD, well nourished   Eyes: no tearing, discharge, no erythema   ENT: moist mucous membranes of the oral cavity, nares patent    Neck: Supple, full range of motion  Cardiovascular: Warm and well perfused, pulses equal and symmetrical  Lungs: Normal work of breathing, normal chest wall excursions  Skin: No rash or lesions, scalp alopecia   Psychiatry: Mood and affect are appropriate   Abdomen: soft, non tender, non distended  Extremeties: No cyanosis, clubbing or edema.    Neurological   MENTAL STATUS: Alert and oriented to person, place, and time. Attention and concentration within normal limits. Speech without dysarthria, able to name and repeat without difficulty. Recent and remote memory within normal limits   CRANIAL NERVES: Visual fields intact. PERRL. EOMI. Facial sensation intact. Face symmetrical. Hearing grossly intact. Full shoulder shrug bilaterally. Tongue protrudes midline   SENSORY: Sensation is intact to light touch throughout.  Joint position perception intact. Negative Romberg.   MOTOR: Normal bulk and tone. No pronator drift.  5/5 deltoid, biceps, triceps, interosseous, hand  bilaterally. 5/5 iliopsoas, knee extension/flexion, foot dorsi/plantarflexion bilaterally.    REFLEXES: Symmetric and 2+ throughout. Toes down going bilaterally.   CEREBELLAR/COORDINATION/GAIT: Gait steady with normal arm swing and stride length.  Heel to shin  Administered By (Optional): Dr. Markle Detail Level: Detailed intact. Finger to nose intact.         Medical Necessity Clause: This procedure was medically necessary because the lesions that were treated were: Total Volume Injected (Ccs- Only Use Numbers And Decimals): 2 Concentration Of Solution Injected (Mg/Ml): 2.5 Kenalog Preparation: Kenalog

## 2024-09-04 ENCOUNTER — HOSPITAL ENCOUNTER (OUTPATIENT)
Dept: RADIOLOGY | Facility: HOSPITAL | Age: 74
Discharge: HOME | End: 2024-09-04
Attending: NURSE PRACTITIONER
Payer: MEDICARE

## 2024-09-04 DIAGNOSIS — Z12.31 ENCOUNTER FOR SCREENING MAMMOGRAM FOR BREAST CANCER: ICD-10-CM

## 2024-09-04 PROCEDURE — 77067 SCR MAMMO BI INCL CAD: CPT | Mod: TC

## 2024-09-10 ENCOUNTER — PATIENT MESSAGE (OUTPATIENT)
Dept: NEUROLOGY | Facility: CLINIC | Age: 74
End: 2024-09-10
Payer: MEDICARE

## 2024-09-13 ENCOUNTER — IMMUNIZATION (OUTPATIENT)
Dept: INTERNAL MEDICINE | Facility: CLINIC | Age: 74
End: 2024-09-13
Payer: MEDICARE

## 2024-09-13 DIAGNOSIS — Z23 NEED FOR VACCINATION: Primary | ICD-10-CM

## 2024-09-16 PROBLEM — Z74.09 IMPAIRED FUNCTIONAL MOBILITY, BALANCE, AND ENDURANCE: Status: ACTIVE | Noted: 2024-09-16

## 2024-09-23 RX ORDER — RIMEGEPANT SULFATE 75 MG/75MG
75 TABLET, ORALLY DISINTEGRATING ORAL
Qty: 10 TABLET | Refills: 1 | Status: SHIPPED | OUTPATIENT
Start: 2024-09-23

## 2024-10-21 ENCOUNTER — OFFICE VISIT (OUTPATIENT)
Dept: INTERNAL MEDICINE | Facility: CLINIC | Age: 74
End: 2024-10-21
Payer: MEDICARE

## 2024-10-21 VITALS
DIASTOLIC BLOOD PRESSURE: 70 MMHG | WEIGHT: 198.44 LBS | HEIGHT: 67 IN | BODY MASS INDEX: 31.15 KG/M2 | HEART RATE: 50 BPM | OXYGEN SATURATION: 97 % | SYSTOLIC BLOOD PRESSURE: 110 MMHG

## 2024-10-21 DIAGNOSIS — Z78.0 POST-MENOPAUSAL: Primary | ICD-10-CM

## 2024-10-21 DIAGNOSIS — I47.10 SVT (SUPRAVENTRICULAR TACHYCARDIA): ICD-10-CM

## 2024-10-21 DIAGNOSIS — G43.809 VESTIBULAR MIGRAINE: ICD-10-CM

## 2024-10-21 DIAGNOSIS — Z76.89 ENCOUNTER TO ESTABLISH CARE: ICD-10-CM

## 2024-10-21 DIAGNOSIS — E03.4 HYPOTHYROIDISM DUE TO ACQUIRED ATROPHY OF THYROID: ICD-10-CM

## 2024-10-21 DIAGNOSIS — R79.9 ABNORMAL FINDING OF BLOOD CHEMISTRY, UNSPECIFIED: ICD-10-CM

## 2024-10-21 DIAGNOSIS — E78.00 HYPERCHOLESTEROLEMIA: ICD-10-CM

## 2024-10-21 PROCEDURE — 99213 OFFICE O/P EST LOW 20 MIN: CPT | Mod: PBBFAC

## 2024-10-21 PROCEDURE — 99999 PR PBB SHADOW E&M-EST. PATIENT-LVL III: CPT | Mod: PBBFAC,GC,,

## 2024-10-21 NOTE — PROGRESS NOTES
[facility]  CLINIC NOTE    Patient Name: Viv De La Torre  YOB: 1950    Subjective:      Chief Complaint: Establish Care    HPI  Viv De La Torre is a 74 year old female with hx of meniere's disease, hypothyroidism, anxiety/depression, HLD, PVCs/SVT, recently dx vestibular migraines who presents to establish care. No specific concerns or complaints today.     HLD - last lipid panel 4/2023: total cholesterol 160//HDL 54/LDL 73. Currently on rosuvastatin 10 mg nightly which patient reports adherence to. Due for repeat lipid panel.     Hypothyroidism - currently on levothyroxine 25 mcg daily, recent TSH wnl. Reports some weight gain. Denies fatigue, diarrhea/constipation.    Anxiety/depression - continues on sertraline 100 mg which has been a long term medication for her, denies mood changes.     SVT - currently on atenolol 100 mg daily    Vestibular migraines - recently dx during last admission after presenting for dizziness, has since seen neurology outpatient with initial plans to trial medications PRN but unfortunately were too costly. Scheduled ENT appointment outpatient on 10/25. She has been working with PT/OT for vestibular therapy.     Healthcare maintenance - due for DEXA scan, mammogram & colonoscopy are UTD. Also UTD on recommended vaccinations including yearly flu.     Review of Systems   Constitutional:  Negative for chills, fever, malaise/fatigue and weight loss.   HENT:  Negative for sore throat.    Eyes:  Negative for blurred vision and double vision.   Respiratory:  Negative for cough, shortness of breath and wheezing.    Cardiovascular:  Negative for chest pain, palpitations and leg swelling.   Gastrointestinal:  Negative for blood in stool, constipation, diarrhea, nausea and vomiting.   Genitourinary:  Negative for dysuria, frequency and urgency.   Musculoskeletal:  Negative for falls, myalgias and neck pain.   Neurological:  Positive for dizziness. Negative for  weakness and headaches.   Psychiatric/Behavioral:  Negative for depression. The patient is not nervous/anxious.        Past Medical History:   Diagnosis Date    AK (actinic keratosis)     Allergy     Seasonal    Anxiety     Arthritis of both knees 04/22/2015    Basal cell carcinoma     Basal cell carcinoma 10/06/2021    Right upper back    Cataract     Depression     Dizziness     Fibrocystic breast     Fracture of lateral malleolus     High cholesterol     Hypothyroidism due to acquired atrophy of thyroid     Nuclear sclerosis - Both Eyes 05/06/2013    Osteoarthritis 2012    [facility]    SVT (supraventricular tachycardia)        Past Surgical History:   Procedure Laterality Date    ANKLE FRACTURE SURGERY  2013    Taiwo Macias MD    BREAST BIOPSY Left     core bx, b9    CATARACT EXTRACTION W/  INTRAOCULAR LENS IMPLANT Right 12/19/2019    Procedure: EXTRACTION, CATARACT, WITH IOL INSERTION;  Surgeon: Aleyda Acosta MD;  Location: [facility] OR;  Service: Ophthalmology;  Laterality: Right;  LASER ASSISTED    CATARACT EXTRACTION W/  INTRAOCULAR LENS IMPLANT Left 1/16/2020    Procedure: EXTRACTION, CATARACT, WITH IOL INSERTION;  Surgeon: Aleyda Acosta MD;  Location: [facility] OR;  Service: Ophthalmology;  Laterality: Left;    COLONOSCOPY  08/21/03    COLONOSCOPY N/A 5/6/2021    Procedure: COLONOSCOPY;  Surgeon: Lon Chapin MD;  Location: [facility] ENDO;  Service: Endoscopy;  Laterality: N/A;    ELBOW FRACTURE SURGERY  2004    Lang Daniels MD    ELBOW SURGERY  2004    Lang Daniels MD    EYE SURGERY  Cataract    FRACTURE SURGERY  Left ankle/ leg- left elbow/arm    fx arm      HUMERUS FRACTURE SURGERY  2004    Lang Daniels MD    HYSTERECTOMY         Family History   Problem Relation Name Age of Onset    Cancer Mother Jennifer MONTES DE OCA Orin         lung cancer    Cancer Father Cameron JEWEL Sr         renal cancer    Heart disease Sister Leilani Maribeth De Jesus     Hypertension Sister Leilani Maribeth De Jesus     Heart disease Brother Cameron  Robin Sr Jr     Hypertension Brother Cameron Sr Jr     Cataracts Maternal Grandmother      Cancer Sister Polina Sr     Melanoma Neg Hx      Skin cancer Neg Hx      Amblyopia Neg Hx      Blindness Neg Hx      Diabetes Neg Hx      Glaucoma Neg Hx      Macular degeneration Neg Hx      Retinal detachment Neg Hx      Strabismus Neg Hx      Stroke Neg Hx      Thyroid disease Neg Hx         Social History     Socioeconomic History    Marital status:    Occupational History    Occupation: Sales   Tobacco Use    Smoking status: Never    Smokeless tobacco: Never   Substance and Sexual Activity    Alcohol use: Yes     Alcohol/week: 7.0 - 8.0 standard drinks of alcohol     Types: 4 - 5 Glasses of wine, 3 Cans of beer per week     Comment: Not every week Not every day    Drug use: No    Sexual activity: Yes     Partners: Male     Birth control/protection: Post-menopausal     Comment: Total hysterectomy 2004     Social Drivers of Health     Financial Resource Strain: Low Risk  (7/22/2024)    Overall Financial Resource Strain (CARDIA)     Difficulty of Paying Living Expenses: Not hard at all   Food Insecurity: No Food Insecurity (7/22/2024)    Hunger Vital Sign     Worried About Running Out of Food in the Last Year: Never true     Ran Out of Food in the Last Year: Never true   Transportation Needs: No Transportation Needs (7/22/2024)    PRAPARE - Transportation     Lack of Transportation (Medical): No     Lack of Transportation (Non-Medical): No   Physical Activity: Inactive (7/22/2024)    Exercise Vital Sign     Days of Exercise per Week: 0 days     Minutes of Exercise per Session: 0 min   Stress: No Stress Concern Present (7/22/2024)    [employer] - Occupational Stress Questionnaire     Feeling of Stress : Not at all   Housing Stability: Low Risk  (7/22/2024)    Housing Stability Vital Sign     Unable to Pay for Housing in the Last Year: No     Homeless in the Last Year:  No       Medication List with Changes/Refills   Current Medications    ALBUTEROL (PROVENTIL/VENTOLIN HFA) 90 MCG/ACTUATION INHALER    Inhale 2 puffs into the lungs every 4 (four) hours as needed for Wheezing or Shortness of Breath (cough). Rescue    ATENOLOL (TENORMIN) 100 MG TABLET    Take 100 mg by mouth once daily.    AZELASTINE (ASTELIN) 137 MCG (0.1 %) NASAL SPRAY    2 sprays (274 mcg total) by Nasal route 2 (two) times daily.    BIMATOPROST (LATISSE) 0.03 % OPHTHALMIC SOLUTION    Place 1 application  into both eyes every evening. Place one drop on applicator and apply evenly along the skin of the upper eyelid at base of eyelashes once daily at bedtime; repeat procedure for second eye (use a clean applicator).    CLOBETASOL 0.05% (TEMOVATE) 0.05 % OINT    Apply topically 2 (two) times daily.    COENZYME Q10 100 MG CAPSULE    Take 1 capsule (100 mg total) by mouth once daily.    COVID-19 (COMIRNATY 2024-25, 12Y UP,,PF,) 30 MCG/0.3 ML IM VACCINE (>/= 13 YO)    Inject into the muscle.    ESTRADIOL (ESTRACE) 0.01 % (0.1 MG/GRAM) VAGINAL CREAM    Place 1 g vaginally once daily.    ESTRADIOL (ESTRACE) 2 MG TABLET    TAKE 1 TABLET BY MOUTH ONCE DAILY    IBUPROFEN (ADVIL,MOTRIN) 200 MG TABLET    Take 800 mg by mouth daily as needed for Pain.    LEVOTHYROXINE (EUTHYROX) 25 MCG TABLET    Take 1 tablet (25 mcg total) by mouth once daily.    MAGNESIUM OXIDE (MAG-OX) 400 MG (241.3 MG MAGNESIUM) TABLET    Take 1 tablet (400 mg total) by mouth once daily.    MUPIROCIN (BACTROBAN) 2 % OINTMENT    Apply topically 2 (two) times daily as needed (Rash).    RIBOFLAVIN, VITAMIN B2, 400 MG TAB    Take 400 mg by mouth once daily.    RIMEGEPANT (NURTEC) 75 MG ODT    Take 1 tablet (75 mg total) by mouth as needed for Migraine. Place ODT tablet on the tongue; alternatively the ODT tablet may be placed under the tongue    ROSUVASTATIN (CRESTOR) 10 MG TABLET    Take 1 tablet (10 mg total) by mouth nightly.    SERTRALINE (ZOLOFT) 100 MG  "TABLET    TAKE 1 TABLET BY MOUTH EVERY DAY   Discontinued Medications    MECLIZINE (ANTIVERT) 25 MG TABLET    Take 1 tablet (25 mg total) by mouth 3 (three) times daily as needed for Dizziness.    PHENTERMINE 15 MG CAPSULE    Take 1 capsule (15 mg total) by mouth every morning.       Patient Active Problem List   Diagnosis    Hypercholesterolemia    Meniere disease    Nuclear sclerosis - Both Eyes    Arthritis of both knees    Family history of cardiovascular disease    SVT (supraventricular tachycardia)    Hypothyroidism due to acquired atrophy of thyroid    PVC (premature ventricular contraction)    Elevated LFTs    Alopecia of scalp    Nevus of choroid of right eye    Tremor    Dizziness    Vestibular migraine    Impaired functional mobility, balance, and endurance       Objective:      /70 (BP Location: Right arm, Patient Position: Sitting)   Pulse (!) 50   Ht 5' 7" (1.702 m)   Wt 90 kg (198 lb 6.6 oz)   SpO2 97%   BMI 31.08 kg/m²   Estimated body mass index is 31.08 kg/m² as calculated from the following:    Height as of this encounter: 5' 7" (1.702 m).    Weight as of this encounter: 90 kg (198 lb 6.6 oz).    Physical Exam  Vitals reviewed.   Constitutional:       General: She is not in acute distress.     Appearance: Normal appearance. She is not ill-appearing.   HENT:      Head: Normocephalic and atraumatic.      Comments: Hair loss, previously noted scalp alopecia     Mouth/Throat:      Mouth: Mucous membranes are moist.      Pharynx: Oropharynx is clear.   Eyes:      General: No scleral icterus.     Extraocular Movements: Extraocular movements intact.      Conjunctiva/sclera: Conjunctivae normal.   Cardiovascular:      Rate and Rhythm: Normal rate and regular rhythm.      Pulses: Normal pulses.      Heart sounds: Normal heart sounds. No murmur heard.  Pulmonary:      Effort: Pulmonary effort is normal. No respiratory distress.      Breath sounds: Normal breath sounds. No wheezing, rhonchi or " rales.   Abdominal:      General: Abdomen is flat. There is no distension.      Tenderness: There is no abdominal tenderness. There is no guarding or rebound.   Musculoskeletal:         General: No swelling. Normal range of motion.      Right lower leg: No edema.      Left lower leg: No edema.   Skin:     General: Skin is warm and dry.      Coloration: Skin is not jaundiced.      Findings: No bruising.   Neurological:      Mental Status: She is alert and oriented to person, place, and time.      Cranial Nerves: No cranial nerve deficit.      Motor: No weakness.      Gait: Gait normal.   Psychiatric:         Mood and Affect: Mood normal.         Behavior: Behavior normal.         Assessment and Plan:   74F with hx of meniere's disease, hypothyroidism, anxiety/depression, HLD, PVCs/SVT, recently dx vestibular migraines who presents to establish care.     Post-menopausal (Primary)  -continue estradiol for now, previously managed by patient's ob/gyn since menopause that occurred at age 48  -will discuss need for continuation of estrogen supplementation at next visit  -due for DEXA scan, ordered    - DXA Bone Density Axial Skeleton 1 or more sites; Future    Hypothyroidism due to acquired atrophy of thyroid  -last TSH 3.921 on 8/20  -continue levothyroxine 25 mcg daily    Vestibular migraine  -follows with neurology and ENT outpatient  -continue vestibular therapy with PT/OT    - Ambulatory referral/consult to Internal Medicine    Hypercholesterolemia  -last lipid panel over one year ago, reviewed  -repeat lipid panel, patient to return when fasting  -continue rosuvastatin 10 mg nightly    - LIPID PANEL; Future  - HEMOGLOBIN A1C; Future    SVT (supraventricular tachycardia)  -continue atenolol 100 mg daily, has been long term med  -monitor for s/s concerning for bradycardia which may suggest too high of dose          Problem List Items Addressed This Visit       Hypercholesterolemia    Relevant Orders    LIPID PANEL     HEMOGLOBIN A1C    SVT (supraventricular tachycardia)    Hypothyroidism due to acquired atrophy of thyroid    Relevant Orders    HEMOGLOBIN A1C    Vestibular migraine     Other Visit Diagnoses       Post-menopausal    -  Primary    Relevant Orders    DXA Bone Density Axial Skeleton 1 or more sites    Encounter to establish care        Abnormal finding of blood chemistry, unspecified        Relevant Orders    HEMOGLOBIN A1C            Follow Up:       Viv was seen today for establish care.    Diagnoses and all orders for this visit:    Post-menopausal  -     DXA Bone Density Axial Skeleton 1 or more sites; Future    Hypothyroidism due to acquired atrophy of thyroid  -     HEMOGLOBIN A1C; Future    Vestibular migraine  -     Ambulatory referral/consult to Internal Medicine    Hypercholesterolemia  -     LIPID PANEL; Future  -     HEMOGLOBIN A1C; Future    SVT (supraventricular tachycardia)    Encounter to establish care    Abnormal finding of blood chemistry, unspecified  -     HEMOGLOBIN A1C; Future            Other Orders Placed This Visit:  Orders Placed This Encounter   Procedures    DXA Bone Density Axial Skeleton 1 or more sites    LIPID PANEL    HEMOGLOBIN A1C         Interview, Assessment, Findings, and Plan discussed with Dr. Valenzuela    Follow up in about 6 months (around 4/21/2025).    Kary Huntley MD   Internal Medicine, PGY2  [facility]

## 2024-10-25 ENCOUNTER — CLINICAL SUPPORT (OUTPATIENT)
Dept: AUDIOLOGY | Facility: CLINIC | Age: 74
End: 2024-10-25
Payer: MEDICARE

## 2024-10-25 ENCOUNTER — OFFICE VISIT (OUTPATIENT)
Dept: OTOLARYNGOLOGY | Facility: CLINIC | Age: 74
End: 2024-10-25
Payer: MEDICARE

## 2024-10-25 DIAGNOSIS — G43.809 VESTIBULAR MIGRAINE: Primary | ICD-10-CM

## 2024-10-25 DIAGNOSIS — R42 DIZZINESS AND GIDDINESS: ICD-10-CM

## 2024-10-25 DIAGNOSIS — H90.3 BILATERAL HIGH FREQUENCY SENSORINEURAL HEARING LOSS: Primary | ICD-10-CM

## 2024-10-25 PROCEDURE — 99214 OFFICE O/P EST MOD 30 MIN: CPT | Mod: S$PBB,,, | Performed by: OTOLARYNGOLOGY

## 2024-10-25 PROCEDURE — 99999 PR PBB SHADOW E&M-EST. PATIENT-LVL I: CPT | Mod: PBBFAC,,, | Performed by: AUDIOLOGIST

## 2024-10-25 PROCEDURE — 99213 OFFICE O/P EST LOW 20 MIN: CPT | Mod: PBBFAC,25,27 | Performed by: OTOLARYNGOLOGY

## 2024-10-25 PROCEDURE — 99211 OFF/OP EST MAY X REQ PHY/QHP: CPT | Mod: PBBFAC | Performed by: AUDIOLOGIST

## 2024-10-25 PROCEDURE — 99999 PR PBB SHADOW E&M-EST. PATIENT-LVL III: CPT | Mod: PBBFAC,,, | Performed by: OTOLARYNGOLOGY

## 2024-10-25 NOTE — PROGRESS NOTES
"Viv De La Torre, a 74 y.o. female, was seen today in the clinic for an audiologic evaluation.  The patient's main complaint was dizziness.  Ms. De La Torre reported that she has had episodes of dizziness for her entire life.  Her most recent episode occurred about a month ago and lasted the entire month.  She noted that once during the episode, she had a "lightning strike" in her eyes where she was unable to see for a brief period of time.  Ms. De La Torre reported that a Neurologist recently diagnosed her with vestibular migraines.  Patient reported intermittent aural fullness and sounds in her ears.  Patient denied otalgia.    Tympanometry revealed Type A in the right ear and Type A in the left ear. Audiogram results revealed normal hearing sloping to mild high-frequency sensorineural hearing loss in the right ear and normal hearing sloping to mild high frequency sensorineural hearing loss in the left ear.  Speech reception thresholds were noted at 15 dB in the right ear and 10 dB in the left ear.  Speech discrimination scores were 100% in the right ear and 100% in the left ear.    Recommendations:  Otologic evaluation  Annual audiogram or sooner if change in hearing is perceived  Hearing protection when in noise          "

## 2024-11-04 DIAGNOSIS — E78.00 HYPERCHOLESTEROLEMIA: ICD-10-CM

## 2024-11-04 RX ORDER — ROSUVASTATIN CALCIUM 10 MG/1
10 TABLET, COATED ORAL NIGHTLY
Qty: 90 TABLET | Refills: 3 | Status: SHIPPED | OUTPATIENT
Start: 2024-11-04 | End: 2025-11-04

## 2024-11-04 NOTE — TELEPHONE ENCOUNTER
Refill Routing Note   Medication(s) are not appropriate for processing by [facility] for the following reason(s):        Non-participating provider    [facility] action(s):  Route               Appointments  past 12m or future 3m with PCP    Date Provider   Last Visit   10/21/2024 Kary Huntley MD   Next Visit   Visit date not found Kary Huntley MD   ED visits in past 90 days: 0        Note composed:3:13 PM 11/04/2024

## 2024-11-26 ENCOUNTER — OFFICE VISIT (OUTPATIENT)
Dept: DERMATOLOGY | Facility: CLINIC | Age: 74
End: 2024-11-26
Payer: MEDICARE

## 2024-11-26 ENCOUNTER — PATIENT MESSAGE (OUTPATIENT)
Dept: ADMINISTRATIVE | Facility: HOSPITAL | Age: 74
End: 2024-11-26
Payer: MEDICARE

## 2024-11-26 DIAGNOSIS — L30.9 DERMATITIS: Primary | ICD-10-CM

## 2024-11-26 PROCEDURE — 99214 OFFICE O/P EST MOD 30 MIN: CPT | Mod: S$GLB,,, | Performed by: DERMATOLOGY

## 2024-11-26 RX ORDER — TRIAMCINOLONE ACETONIDE 1 MG/G
CREAM TOPICAL
Qty: 80 G | Refills: 2 | Status: SHIPPED | OUTPATIENT
Start: 2024-11-26

## 2024-11-26 NOTE — PROGRESS NOTES
Patient Information  Name: Viv De La Torre  : 1950  MRN: [id number]     Referring Physician:  No ref. provider found   Primary Care Physician:  Kary Huntley MD   Date of Visit: 24      Subjective:     History of Present lllness:    Viv De La Torre is a 74 y.o. female who presents with a chief complaint of things growing on back.  Location: back, chest   Duration: recent  Signs/Symptoms: things growing on back, spreading, no pain or bleeding, pt feels scared and upset that things are growing and wants them removed today  Relieving factors/Prior treatments: none, OTC moisturizer    Patient was last seen: 2023.  Prior notes by myself reviewed.   Clinical documentation obtained by nursing staff reviewed.    Review of Systems    Objective:   Physical Exam   Constitutional: She appears well-developed and well-nourished. No distress.   Neurological: She is alert and oriented to person, place, and time. She is not disoriented.   Psychiatric: She has a normal mood and affect.   Skin:   Areas Examined (abnormalities noted in diagram):   Head / Face Inspection Performed  Neck Inspection Performed  Chest / Axilla Inspection Performed  Back Inspection Performed  RUE Inspected  LUE Inspection Performed            Diagram Legend     Erythematous scaling macule/papule c/w actinic keratosis       Vascular papule c/w angioma      Pigmented verrucoid papule/plaque c/w seborrheic keratosis      Yellow umbilicated papule c/w sebaceous hyperplasia      Irregularly shaped tan macule c/w lentigo     1-2 mm smooth white papules consistent with Milia      Movable subcutaneous cyst with punctum c/w epidermal inclusion cyst      Subcutaneous movable cyst c/w pilar cyst      Firm pink to brown papule c/w dermatofibroma      Pedunculated fleshy papule(s) c/w skin tag(s)      Evenly pigmented macule c/w junctional nevus     Mildly variegated pigmented, slightly irregular-bordered macule c/w mildly atypical nevus      Flesh colored to  evenly pigmented papule c/w intradermal nevus       Pink pearly papule/plaque c/w basal cell carcinoma      Erythematous hyperkeratotic cursted plaque c/w SCC      Surgical scar with no sign of skin cancer recurrence      Open and closed comedones      Inflammatory papules and pustules      Verrucoid papule consistent consistent with wart     Erythematous eczematous patches and plaques     Dystrophic onycholytic nail with subungual debris c/w onychomycosis     Umbilicated papule    Erythematous-base heme-crusted tan verrucoid plaque consistent with inflamed seborrheic keratosis     Erythematous Silvery Scaling Plaque c/w Psoriasis     See annotation    No images are attached to the encounter or orders placed in the encounter.      [] Data reviewed  [] Prior external notes reviewed  [] Independent review of test  [] Management discussed with another provider  [] Independent historian    Assessment / Plan:        Dermatitis  - new problem with uncertain prognosis  Differential diagnosis includes bita's vs guttate psoriasis vs lichen planus vs irritant contact dermatitis vs inflamed SKs vs other.  Pt would like to know what is causing it, so we discussed risks, benefits, and alternatives of biopsy, including but not limited to scarring, bleeding, infection, recurrence, and need for additional treatment of site.  Recommended that we start with a trial of topical steroid as this will help with several conditions in the differential, and the rash may be self-limited. Pt agrees.  -     triamcinolone acetonide 0.1% (KENALOG) 0.1 % cream; Apply to affected areas of body BID prn rash. Do not use on face, underarms, or groin.  Dispense: 80 g; Refill: 2  Side effects from the overuse of topical steroids include thinning of skin, easy tearing/bruising of skin, stretch marks, spider veins, etc. Use the topical steroid no more than 2 days per week if used long-term and/or take breaks from the topical steroid, especially if any  of the above side effects are noticed.      Follow up if symptoms worsen or fail to improve.      Jany Nguyen MD, FAAD  [facility]

## 2025-01-08 ENCOUNTER — TELEPHONE (OUTPATIENT)
Dept: NEUROLOGY | Facility: CLINIC | Age: 75
End: 2025-01-08
Payer: MEDICARE

## 2025-01-08 NOTE — TELEPHONE ENCOUNTER
Attempted to call pt. M stating I'm scheduling appt for Dr. Colbert's next soonest--Tuesday. Feb 18th @ 3:30pm. Left name and call back number if pt needs to r/s appt    ----- Message from Briana sent at 1/8/2025  8:48 AM CST -----  Regarding: Appt Access  Romelia, Ms. De La Torre called into the Access Navigator line to request that her appointment with Dr. Colbert be canceled due to the fact that she is currently in [place] and would not be able to make the drive back to [place] due to snow. She requested to have her appointment with Dr. Colbert rescheduled, however, when attempting to reschedule we were not able to access Dr. Colbert's schedule. Despite the fact that we were not able to reschedule her appointment she still requested to have the current appointment canceled because she will not be able to make that appointment.      I informed Ms. De La Torre that a member from Dr. Colbert's clinical staff will contact her to reschedule her appointment. I have updated the patients contact information in Epic.       Thank you,     Briana Dutton  Access Navigator

## 2025-02-10 ENCOUNTER — TELEPHONE (OUTPATIENT)
Dept: OTOLARYNGOLOGY | Facility: CLINIC | Age: 75
End: 2025-02-10
Payer: MEDICARE

## 2025-02-18 ENCOUNTER — OFFICE VISIT (OUTPATIENT)
Dept: NEUROLOGY | Facility: CLINIC | Age: 75
End: 2025-02-18
Payer: MEDICARE

## 2025-02-18 VITALS — HEART RATE: 51 BPM | DIASTOLIC BLOOD PRESSURE: 60 MMHG | SYSTOLIC BLOOD PRESSURE: 96 MMHG

## 2025-02-18 DIAGNOSIS — G43.809 VESTIBULAR MIGRAINE: Primary | ICD-10-CM

## 2025-02-18 PROCEDURE — 99213 OFFICE O/P EST LOW 20 MIN: CPT | Mod: PBBFAC

## 2025-02-18 NOTE — PROGRESS NOTES
"[facility] - NEUROLOGY 7TH [place] [facility], [place]    Date: 2/18/25  Patient Name: Viv De La Torre   MRN: [id number]   PCP: Kary Huntley  Referring Provider: No ref. provider found    Assessment:   Viv De La Torre is a 75 y.o. female presenting after hospital admission for dizziness in 08/2024. She had roughly 12 dizzy spells this year and the latest one leading to admission lasted for 3.5 weeks. During the episode she also experienced a bright flashing light in the right eye and associated blindness; no headache was related with this event. She experienced 2 severe migraines many years ago that caused weakness of the right side of her body. Her history and presentation are suspicious for remote history of hemiplegic migraine, current acephalgic migraine with aura, and a vestibular component, I.e vestibular migraine. Recommend treating attacks with abortive headache medications and continue preventative medications. Ubrelvy not covered by insurance; I will switch to nurtec.    Plan:     - Ubrelvy 50 mg prn not covered by insurance; will order rimegepant 75 mg prn  - Continue magnesium 400 mg, coQ, riboflavin, and melatonin  - Vestibular therapy   - RTC within 6 months     Problem List Items Addressed This Visit          Neuro    Vestibular migraine - Primary         Bharath Colbert MD    Patient note was created using MModal Dictation.  Any errors in syntax or even information may not have been identified and edited on initial review prior to signing this note.  Subjective:   Patient seen in consultation at the request of No ref. provider found for the evaluation of dizziness. A copy of this note will be sent to the referring physician.        Interval 2/18/2025:    - Describes recent episode 4 days ago of seeing "lighting bolts" in her left eye but soon subsided; she has not had a similar episode since this summer   - Has had 3-4 dizzy spells since last appointment   - Otherwise no new " "associated symptoms       HPI: 8/30/2024    Mrs. Viv De La Torre is a 75 y.o. female with history of hypothyroidism, hld, anxiety, depression, SVT, alopecia, dizziness, and basal cell carcinoma  presenting after hospital follow up for dizziness. She presented 8/20/2024 with 3.5 weeks of dizziness that would not terminate. She denied any nausea or vomiting but the sensation of spinning "inside my head" when the eyes are closed. Supposedly she has experienced these dizzy spells for as long as she remembers as a child. During this most recent attack she noticed a bright flashing light in her right eye and temporary blindness. This also occurred while driving causing her to pull over. Of note, she experienced 2 migraine headaches many years ago that resulted in weakness on one side of her body. She did not have a headache during this episode. Stroke work up was negative during admission. MRI brain did not reveal acute structural finding in the bilateral hemispheres or cerebellum. She tried meclizine but that did not help her. Over the course of this year she had approximately 12 dizzy spells and this one was the worst. She denies other associated symptoms at this time and is adherent with medications.        PAST MEDICAL HISTORY:  Past Medical History:   Diagnosis Date    AK (actinic keratosis)     Allergy     Seasonal    Anxiety     Arthritis of both knees 04/22/2015    Basal cell carcinoma     Basal cell carcinoma 10/06/2021    Right upper back    Cataract     Depression     Dizziness     Fibrocystic breast     Fracture of lateral malleolus     High cholesterol     Hypothyroidism due to acquired atrophy of thyroid     Nuclear sclerosis - Both Eyes 05/06/2013    Osteoarthritis 2012    [facility]    SVT (supraventricular tachycardia)        PAST SURGICAL HISTORY:  Past Surgical History:   Procedure Laterality Date    ANKLE FRACTURE SURGERY  2013    Taiwo Macias MD    BREAST BIOPSY Left     core bx, b9    CATARACT EXTRACTION " W/  INTRAOCULAR LENS IMPLANT Right 12/19/2019    Procedure: EXTRACTION, CATARACT, WITH IOL INSERTION;  Surgeon: Aleyda Acosta MD;  Location: [facility] OR;  Service: Ophthalmology;  Laterality: Right;  LASER ASSISTED    CATARACT EXTRACTION W/  INTRAOCULAR LENS IMPLANT Left 1/16/2020    Procedure: EXTRACTION, CATARACT, WITH IOL INSERTION;  Surgeon: Aleyda Acosta MD;  Location: [facility] OR;  Service: Ophthalmology;  Laterality: Left;    COLONOSCOPY  08/21/03    COLONOSCOPY N/A 5/6/2021    Procedure: COLONOSCOPY;  Surgeon: Lon Chapin MD;  Location: [facility] ENDO;  Service: Endoscopy;  Laterality: N/A;    ELBOW FRACTURE SURGERY  2004    Lang Daniels MD    ELBOW SURGERY  2004    Lang Daniels MD    EYE SURGERY  Cataract    FRACTURE SURGERY  Left ankle/ leg- left elbow/arm    fx arm      HUMERUS FRACTURE SURGERY  2004    Lang Daniels MD    HYSTERECTOMY         CURRENT MEDS:  Current Outpatient Medications   Medication Sig Dispense Refill    albuterol (PROVENTIL/VENTOLIN HFA) 90 mcg/actuation inhaler Inhale 2 puffs into the lungs every 4 (four) hours as needed for Wheezing or Shortness of Breath (cough). Rescue 18 g 0    atenoloL (TENORMIN) 100 MG tablet Take 100 mg by mouth once daily.      azelastine (ASTELIN) 137 mcg (0.1 %) nasal spray 2 sprays (274 mcg total) by Nasal route 2 (two) times daily. 30 mL 1    bimatoprost (LATISSE) 0.03 % ophthalmic solution Place 1 application  into both eyes every evening. Place one drop on applicator and apply evenly along the skin of the upper eyelid at base of eyelashes once daily at bedtime; repeat procedure for second eye (use a clean applicator). 5 mL 12    clobetasol 0.05% (TEMOVATE) 0.05 % Oint Apply topically 2 (two) times daily.      coenzyme Q10 100 mg capsule Take 1 capsule (100 mg total) by mouth once daily. 30 capsule 11    COVID-19 (COMIRNATY 2024-25, 12Y UP,,PF,) 30 mcg/0.3 mL IM vaccine (>/= 13 yo) Inject into the muscle. 0.3 mL 0    estradioL (ESTRACE) 0.01 % (0.1 mg/gram) vaginal  cream Place 1 g vaginally once daily.      estradiol (ESTRACE) 2 MG tablet TAKE 1 TABLET BY MOUTH ONCE DAILY 90 tablet 3    ibuprofen (ADVIL,MOTRIN) 200 MG tablet Take 800 mg by mouth daily as needed for Pain.      levothyroxine (EUTHYROX) 25 MCG tablet Take 1 tablet (25 mcg total) by mouth once daily. 90 tablet 1    magnesium oxide (MAG-OX) 400 mg (241.3 mg magnesium) tablet Take 1 tablet (400 mg total) by mouth once daily. 30 tablet 11    mupirocin (BACTROBAN) 2 % ointment Apply topically 2 (two) times daily as needed (Rash).      riboflavin, vitamin B2, 400 mg Tab Take 400 mg by mouth once daily. 30 tablet 0    rosuvastatin (CRESTOR) 10 MG tablet Take 1 tablet (10 mg total) by mouth nightly. 90 tablet 3    sertraline (ZOLOFT) 100 MG tablet TAKE 1 TABLET BY MOUTH EVERY DAY 90 tablet 2    triamcinolone acetonide 0.1% (KENALOG) 0.1 % cream Apply to affected areas of body BID prn rash. Do not use on face, underarms, or groin. 80 g 2    rimegepant (NURTEC) 75 mg odt Take 1 tablet (75 mg total) by mouth as needed for Migraine. Place ODT tablet on the tongue; alternatively the ODT tablet may be placed under the tongue 10 tablet 1     No current facility-administered medications for this visit.       ALLERGIES:  Review of patient's allergies indicates:   Allergen Reactions    Penicillins Other (See Comments)     Other reaction(s): Unknown       FAMILY HISTORY:  Family History   Problem Relation Name Age of Onset    Cancer Mother Jennifer MONTES DE OCA Orin         lung cancer    Cancer Father Cameron Sr         renal cancer    Heart disease Sister Leilani De Jesus     Hypertension Sister Leilani Sr Neal     Heart disease Brother Cameron Kelly Orin Jr     Hypertension Brother Cameron Kelly Orin Jr     Cataracts Maternal Grandmother      Cancer Sister Polina Sr     Melanoma Neg Hx      Skin cancer Neg Hx      Amblyopia Neg Hx      Blindness Neg Hx      Diabetes Neg Hx      Glaucoma Neg Hx      Macular  degeneration Neg Hx      Retinal detachment Neg Hx      Strabismus Neg Hx      Stroke Neg Hx      Thyroid disease Neg Hx         SOCIAL HISTORY:  Social History     Tobacco Use    Smoking status: Never    Smokeless tobacco: Never   Substance Use Topics    Alcohol use: Yes     Alcohol/week: 7.0 - 8.0 standard drinks of alcohol     Types: 4 - 5 Glasses of wine, 3 Cans of beer per week     Comment: Not every week Not every day    Drug use: No       Review of Systems:  12 system review of systems is negative except for the symptoms mentioned in HPI.      Objective:     Vitals:    02/18/25 1608   BP: 96/60   BP Location: Left arm   Patient Position: Sitting   Pulse: (!) 51     General: NAD, well nourished   Eyes: no tearing, discharge, no erythema   ENT: moist mucous membranes of the oral cavity, nares patent    Neck: Supple, full range of motion  Cardiovascular: Warm and well perfused, pulses equal and symmetrical  Lungs: Normal work of breathing, normal chest wall excursions  Skin: No rash or lesions, scalp alopecia   Psychiatry: Mood and affect are appropriate   Abdomen: soft, non tender, non distended  Extremeties: No cyanosis, clubbing or edema.    Neurological   MENTAL STATUS: Alert and oriented to person, place, and time. Attention and concentration within normal limits. Speech without dysarthria, able to name and repeat without difficulty. Recent and remote memory within normal limits   CRANIAL NERVES: Visual fields intact. PERRL. EOMI. Facial sensation intact. Face symmetrical. Hearing grossly intact. Full shoulder shrug bilaterally. Tongue protrudes midline   SENSORY: Sensation is intact to light touch throughout.  Joint position perception intact. Negative Romberg.   MOTOR: Normal bulk and tone. No pronator drift.  5/5 deltoid, biceps, triceps, interosseous, hand  bilaterally. 5/5 iliopsoas, knee extension/flexion, foot dorsi/plantarflexion bilaterally.    REFLEXES: Symmetric and 2+ throughout. Toes down  going bilaterally.   CEREBELLAR/COORDINATION/GAIT: Gait steady with normal arm swing and stride length.  Heel to shin intact. Finger to nose intact.

## 2025-02-19 NOTE — PROGRESS NOTES
I have seen the patient, reviewed the Resident's progress note. I have personally interviewed and examined the patient at bedside and agree with the findings.       Ra Ybarra MD  Neurology  [facility] - Neurology 7th Fl

## 2025-03-10 RX ORDER — CLOBETASOL PROPIONATE 0.5 MG/G
OINTMENT TOPICAL 2 TIMES DAILY
Qty: 60 G | Refills: 1 | Status: SHIPPED | OUTPATIENT
Start: 2025-03-10

## 2025-03-10 NOTE — TELEPHONE ENCOUNTER
Care Due:                  Date            Visit Type   Department     Provider  --------------------------------------------------------------------------------                                MYCHART                              FOLLOWUP/OF  [facility]  Last Visit: 11-      FICE VISIT   MEDICINE       Aravind Jimenez  Next Visit: None Scheduled  None         None Found                                                            Last  Test          Frequency    Reason                     Performed    Due Date  --------------------------------------------------------------------------------    Office Visit  15 months..  levothyroxine, sertraline  11- 02-    [facility] Embedded Care Due Messages. Reference number: [id number].   3/10/2025 4:10:10 PM CDT

## 2025-03-10 NOTE — TELEPHONE ENCOUNTER
----- Message from Jodi sent at 3/10/2025  3:59 PM CDT -----  Contact: pt [phone]  Type:  RX Refill RequestWho Called:  Pt Refill or New Rx:  refillRX Name and Strength:  clobetasol 0.05% (TEMOVATE) 0.05 % Oint How is the patient currently taking it? (ex. 1XDay):  -Is this a 30 day or 90 day RX:  30Preferred Pharmacy with phone number:  [facility] [address] [place] [address] [address] [facility] [facility] [address]Debdz: [phone] Fax: [phone] or Mail Order:  Ordering Provider:  RAMON Lane Call Back Number: [phone] Information:  Pt stated she went to [place] to see her daughter and TSA threw out her rx's when they went through her suitcase. Pls call back and advise Pt requested rx asap pls.

## 2025-03-17 ENCOUNTER — PATIENT MESSAGE (OUTPATIENT)
Dept: ADMINISTRATIVE | Facility: HOSPITAL | Age: 75
End: 2025-03-17
Payer: MEDICARE

## 2025-03-24 DIAGNOSIS — F32.5 MAJOR DEPRESSIVE DISORDER WITH SINGLE EPISODE, IN FULL REMISSION: ICD-10-CM

## 2025-03-24 DIAGNOSIS — E03.4 HYPOTHYROIDISM DUE TO ACQUIRED ATROPHY OF THYROID: ICD-10-CM

## 2025-03-24 RX ORDER — SERTRALINE HYDROCHLORIDE 100 MG/1
100 TABLET, FILM COATED ORAL DAILY
Qty: 90 TABLET | Refills: 2 | Status: SHIPPED | OUTPATIENT
Start: 2025-03-24 | End: 2025-03-28 | Stop reason: SDUPTHER

## 2025-03-24 RX ORDER — LEVOTHYROXINE SODIUM 25 UG/1
25 TABLET ORAL DAILY
Qty: 90 TABLET | Refills: 1 | Status: SHIPPED | OUTPATIENT
Start: 2025-03-24 | End: 2025-03-28 | Stop reason: SDUPTHER

## 2025-03-24 NOTE — TELEPHONE ENCOUNTER
No care due was identified.  [facility] Embedded Care Due Messages. Reference number: [id number].   3/24/2025 3:19:50 PM CDT

## 2025-03-24 NOTE — TELEPHONE ENCOUNTER
No care due was identified.  [facility] Embedded Care Due Messages. Reference number: [id number].   3/24/2025 3:15:41 PM CDT

## 2025-03-28 ENCOUNTER — OFFICE VISIT (OUTPATIENT)
Dept: FAMILY MEDICINE | Facility: CLINIC | Age: 75
End: 2025-03-28
Payer: MEDICARE

## 2025-03-28 VITALS
HEART RATE: 76 BPM | TEMPERATURE: 98 F | DIASTOLIC BLOOD PRESSURE: 70 MMHG | OXYGEN SATURATION: 98 % | BODY MASS INDEX: 30.02 KG/M2 | SYSTOLIC BLOOD PRESSURE: 115 MMHG | HEIGHT: 67 IN | WEIGHT: 191.25 LBS | RESPIRATION RATE: 17 BRPM

## 2025-03-28 DIAGNOSIS — Z79.890 HORMONE REPLACEMENT THERAPY: ICD-10-CM

## 2025-03-28 DIAGNOSIS — G25.0 ESSENTIAL TREMOR: ICD-10-CM

## 2025-03-28 DIAGNOSIS — I47.10 SVT (SUPRAVENTRICULAR TACHYCARDIA): ICD-10-CM

## 2025-03-28 DIAGNOSIS — G43.809 VESTIBULAR MIGRAINE: ICD-10-CM

## 2025-03-28 DIAGNOSIS — E03.4 HYPOTHYROIDISM DUE TO ACQUIRED ATROPHY OF THYROID: ICD-10-CM

## 2025-03-28 DIAGNOSIS — F32.5 MAJOR DEPRESSIVE DISORDER WITH SINGLE EPISODE, IN FULL REMISSION: ICD-10-CM

## 2025-03-28 DIAGNOSIS — E78.00 HYPERCHOLESTEROLEMIA: Primary | ICD-10-CM

## 2025-03-28 PROCEDURE — 99999 PR PBB SHADOW E&M-EST. PATIENT-LVL IV: CPT | Mod: PBBFAC,,, | Performed by: INTERNAL MEDICINE

## 2025-03-28 PROCEDURE — 99214 OFFICE O/P EST MOD 30 MIN: CPT | Mod: PBBFAC,PO | Performed by: INTERNAL MEDICINE

## 2025-03-28 RX ORDER — ROSUVASTATIN CALCIUM 10 MG/1
10 TABLET, COATED ORAL NIGHTLY
Qty: 90 TABLET | Refills: 3 | Status: SHIPPED | OUTPATIENT
Start: 2025-03-28 | End: 2026-03-28

## 2025-03-28 RX ORDER — ESTRADIOL 2 MG/1
2 TABLET ORAL DAILY
Qty: 90 TABLET | Refills: 3 | Status: SHIPPED | OUTPATIENT
Start: 2025-03-28

## 2025-03-28 RX ORDER — PRIMIDONE 50 MG/1
50 TABLET ORAL NIGHTLY
Qty: 90 TABLET | Refills: 3 | Status: SHIPPED | OUTPATIENT
Start: 2025-03-28 | End: 2026-03-28

## 2025-03-28 RX ORDER — LEVOTHYROXINE SODIUM 25 UG/1
25 TABLET ORAL DAILY
Qty: 90 TABLET | Refills: 3 | Status: SHIPPED | OUTPATIENT
Start: 2025-03-28

## 2025-03-28 RX ORDER — ESTRADIOL 0.1 MG/G
1 CREAM VAGINAL DAILY
Qty: 42.5 G | Refills: 3 | Status: SHIPPED | OUTPATIENT
Start: 2025-03-28

## 2025-03-28 RX ORDER — SERTRALINE HYDROCHLORIDE 100 MG/1
100 TABLET, FILM COATED ORAL DAILY
Qty: 90 TABLET | Refills: 3 | Status: SHIPPED | OUTPATIENT
Start: 2025-03-28

## 2025-03-28 NOTE — PROGRESS NOTES
Subjective     Viv De La Torre is a 75 y.o. old, female here for Follow-up (Dizziness)    74 y/o with PMH of HLD, SVT/PVC's, hypothyroidism, depression, ET, vestibular migraines    Patient is here for follow-up on chronic medical problems    Recent hospital admission for chronic dizziness, thorough work-up done, eventually diagnosed with vestibular migraines after she started seeing flashing lights in her vision. She has nurtec to take prn.    HLD: on statin therapy and doing well  SVT/PVC's: maintained on atenolol and asymptomatic.  Hypothyroidism: on stable dose of LT4  H/o VMS and has been on HRT for several years, needs refills of estrogren cream and estrace 2 mg tablet.  ET: worsening tremor in head/neck over time; she requests additional meds to try and help.  She walks regularly for exercise.    Review of Systems   All other systems reviewed and are negative.    Medications     Outpatient Medications Marked as Taking for the 3/28/25 encounter (Office Visit) with Aravind Jimenez MD   Medication Sig Dispense Refill    albuterol (PROVENTIL/VENTOLIN HFA) 90 mcg/actuation inhaler Inhale 2 puffs into the lungs every 4 (four) hours as needed for Wheezing or Shortness of Breath (cough). Rescue 18 g 0    atenoloL (TENORMIN) 100 MG tablet Take 100 mg by mouth once daily.      azelastine (ASTELIN) 137 mcg (0.1 %) nasal spray 2 sprays (274 mcg total) by Nasal route 2 (two) times daily. 30 mL 1    bimatoprost (LATISSE) 0.03 % ophthalmic solution Place 1 application  into both eyes every evening. Place one drop on applicator and apply evenly along the skin of the upper eyelid at base of eyelashes once daily at bedtime; repeat procedure for second eye (use a clean applicator). 5 mL 12    clobetasol 0.05% (TEMOVATE) 0.05 % Oint Apply topically 2 (two) times daily. 60 g 1    coenzyme Q10 100 mg capsule Take 1 capsule (100 mg total) by mouth once daily. 30 capsule 11    COVID-19 (COMIRNATY 2024-25, 12Y UP,,PF,) 30 mcg/0.3  "mL IM vaccine (>/= 13 yo) Inject into the muscle. 0.3 mL 0    ibuprofen (ADVIL,MOTRIN) 200 MG tablet Take 800 mg by mouth daily as needed for Pain.      magnesium oxide (MAG-OX) 400 mg (241.3 mg magnesium) tablet Take 1 tablet (400 mg total) by mouth once daily. 30 tablet 11    mupirocin (BACTROBAN) 2 % ointment Apply topically 2 (two) times daily as needed (Rash).      riboflavin, vitamin B2, 400 mg Tab Take 400 mg by mouth once daily. 30 tablet 0    rimegepant 75 mg odt Take 1 tablet (75 mg total) by mouth as needed for Migraine. Place ODT tablet on the tongue; alternatively the ODT tablet may be placed under the tongue 12 tablet 1    triamcinolone acetonide 0.1% (KENALOG) 0.1 % cream Apply to affected areas of body BID prn rash. Do not use on face, underarms, or groin. 80 g 2    [DISCONTINUED] estradioL (ESTRACE) 0.01 % (0.1 mg/gram) vaginal cream Place 1 g vaginally once daily.      [DISCONTINUED] estradiol (ESTRACE) 2 MG tablet TAKE 1 TABLET BY MOUTH ONCE DAILY 90 tablet 3    [DISCONTINUED] levothyroxine (EUTHYROX) 25 MCG tablet Take 1 tablet (25 mcg total) by mouth once daily. 90 tablet 1    [DISCONTINUED] rosuvastatin (CRESTOR) 10 MG tablet Take 1 tablet (10 mg total) by mouth nightly. 90 tablet 3    [DISCONTINUED] sertraline (ZOLOFT) 100 MG tablet Take 1 tablet (100 mg total) by mouth once daily. 90 tablet 2     Objective     /70 (BP Location: Right arm, Patient Position: Sitting)   Pulse 76   Temp 98.2 °F (36.8 °C) (Temporal)   Resp 17   Ht 5' 7" (1.702 m)   Wt 86.7 kg (191 lb 4 oz)   SpO2 98%   BMI 29.95 kg/m²   Physical Exam  Constitutional:       General: She is not in acute distress.     Appearance: Normal appearance. She is well-developed.   HENT:      Head: Normocephalic and atraumatic.   Eyes:      Conjunctiva/sclera: Conjunctivae normal.      Pupils: Pupils are equal, round, and reactive to light.   Neck:      Thyroid: No thyroid mass or thyromegaly.   Cardiovascular:      Rate and " Rhythm: Normal rate and regular rhythm.      Heart sounds: Normal heart sounds. No murmur heard.  Pulmonary:      Effort: No respiratory distress.      Breath sounds: Normal breath sounds.   Abdominal:      General: Bowel sounds are normal.      Palpations: Abdomen is soft.      Tenderness: There is no abdominal tenderness.   Musculoskeletal:         General: No deformity.      Cervical back: Neck supple.   Lymphadenopathy:      Cervical: No cervical adenopathy.      Upper Body:      Right upper body: No supraclavicular adenopathy.      Left upper body: No supraclavicular adenopathy.   Skin:     General: Skin is warm and dry.      Findings: No rash.   Neurological:      Mental Status: She is alert and oriented to person, place, and time.   Psychiatric:         Behavior: Behavior normal.       Assessment and Plan     Hypercholesterolemia  -     rosuvastatin (CRESTOR) 10 MG tablet; Take 1 tablet (10 mg total) by mouth nightly.  Dispense: 90 tablet; Refill: 3    Major depressive disorder with single episode, in full remission  -     sertraline (ZOLOFT) 100 MG tablet; Take 1 tablet (100 mg total) by mouth once daily.  Dispense: 90 tablet; Refill: 3    Hypothyroidism due to acquired atrophy of thyroid  -     levothyroxine (EUTHYROX) 25 MCG tablet; Take 1 tablet (25 mcg total) by mouth once daily.  Dispense: 90 tablet; Refill: 3    Essential tremor  -     primidone (MYSOLINE) 50 MG Tab; Take 1 tablet (50 mg total) by mouth every evening. Start taking 1/2 tablet at bedtime the first week  Dispense: 90 tablet; Refill: 3    Hormone replacement therapy  -     estradioL (ESTRACE) 0.01 % (0.1 mg/gram) vaginal cream; Place 1 g vaginally once daily.  Dispense: 42.5 g; Refill: 3  -     estradioL (ESTRACE) 2 MG tablet; Take 1 tablet (2 mg total) by mouth once daily.  Dispense: 90 tablet; Refill: 3    SVT (supraventricular tachycardia)    Vestibular migraine      Continue medications and treatment as above for stable chronic medical  problems.    ___________________  Aravind Jimenez MD  Internal Medicine and Pediatrics

## 2025-04-07 ENCOUNTER — PATIENT MESSAGE (OUTPATIENT)
Dept: FAMILY MEDICINE | Facility: CLINIC | Age: 75
End: 2025-04-07
Payer: MEDICARE

## 2025-04-07 DIAGNOSIS — E03.4 HYPOTHYROIDISM DUE TO ACQUIRED ATROPHY OF THYROID: ICD-10-CM

## 2025-04-07 RX ORDER — LEVOTHYROXINE SODIUM 25 UG/1
25 TABLET ORAL DAILY
Qty: 90 TABLET | Refills: 3 | Status: SHIPPED | OUTPATIENT
Start: 2025-04-07

## 2025-04-07 NOTE — TELEPHONE ENCOUNTER
No care due was identified.  [facility] Embedded Care Due Messages. Reference number: [id number].   4/07/2025 2:40:05 PM CDT

## 2025-04-07 NOTE — TELEPHONE ENCOUNTER
No care due was identified.  [facility] Embedded Care Due Messages. Reference number: [id number].   4/07/2025 11:14:40 AM CDT

## 2025-04-08 RX ORDER — ATENOLOL 100 MG/1
100 TABLET ORAL
Qty: 90 TABLET | Refills: 3 | Status: SHIPPED | OUTPATIENT
Start: 2025-04-08

## 2025-04-08 NOTE — TELEPHONE ENCOUNTER
Refill Routing Note   Medication(s) are not appropriate for processing by [facility] for the following reason(s):        No active prescription written by provider    [facility] action(s):  Defer               Appointments  past 12m or future 3m with PCP    Date Provider   Last Visit   3/28/2025 Aravind Jimenez MD   Next Visit   Visit date not found Aravind Jimenez MD   ED visits in past 90 days: 0        Note composed:8:05 AM 04/08/2025

## 2025-04-17 ENCOUNTER — PATIENT MESSAGE (OUTPATIENT)
Dept: FAMILY MEDICINE | Facility: CLINIC | Age: 75
End: 2025-04-17
Payer: MEDICARE

## 2025-05-01 ENCOUNTER — PATIENT MESSAGE (OUTPATIENT)
Dept: FAMILY MEDICINE | Facility: CLINIC | Age: 75
End: 2025-05-01
Payer: MEDICARE

## 2025-05-08 ENCOUNTER — OFFICE VISIT (OUTPATIENT)
Dept: FAMILY MEDICINE | Facility: CLINIC | Age: 75
End: 2025-05-08
Payer: MEDICARE

## 2025-05-08 VITALS — OXYGEN SATURATION: 98 % | DIASTOLIC BLOOD PRESSURE: 76 MMHG | SYSTOLIC BLOOD PRESSURE: 116 MMHG | HEART RATE: 64 BPM

## 2025-05-08 DIAGNOSIS — G25.0 ESSENTIAL TREMOR: Primary | ICD-10-CM

## 2025-05-08 DIAGNOSIS — H69.90 DYSFUNCTION OF EUSTACHIAN TUBE, UNSPECIFIED LATERALITY: ICD-10-CM

## 2025-05-08 DIAGNOSIS — G43.809 VESTIBULAR MIGRAINE: ICD-10-CM

## 2025-05-08 PROCEDURE — 99214 OFFICE O/P EST MOD 30 MIN: CPT | Mod: S$PBB,,, | Performed by: INTERNAL MEDICINE

## 2025-05-08 PROCEDURE — 99999 PR PBB SHADOW E&M-EST. PATIENT-LVL III: CPT | Mod: PBBFAC,,, | Performed by: INTERNAL MEDICINE

## 2025-05-08 PROCEDURE — 99213 OFFICE O/P EST LOW 20 MIN: CPT | Mod: PBBFAC,PO | Performed by: INTERNAL MEDICINE

## 2025-05-08 RX ORDER — TOPIRAMATE 50 MG/1
TABLET, FILM COATED ORAL
Qty: 60 TABLET | Refills: 11 | Status: SHIPPED | OUTPATIENT
Start: 2025-05-08

## 2025-05-08 NOTE — PROGRESS NOTES
Subjective     Viv De La Torre is a 75 y.o. old, female here for Medication Refill    76 y/o with PMH of HLD, SVT/PVC's, hypothyroidism, depression, ET, vestibular migraines     ET: trials of BB's and primidone have not helped long term to central/neck tremor. She would like to try a different medication.    Vestibular migraines: intermittent auras, recently had some unusual symptoms at night when she was sleeping lightly.  She takes nurtec prn.    Review of Systems   Constitutional: Negative.    HENT:  Positive for congestion.      Medications     Outpatient Medications Marked as Taking for the 5/8/25 encounter (Office Visit) with Aravind Jimenez MD   Medication Sig Dispense Refill    albuterol (PROVENTIL/VENTOLIN HFA) 90 mcg/actuation inhaler Inhale 2 puffs into the lungs every 4 (four) hours as needed for Wheezing or Shortness of Breath (cough). Rescue 18 g 0    atenoloL (TENORMIN) 100 MG tablet Take 1 tablet by mouth once daily 90 tablet 3    azelastine (ASTELIN) 137 mcg (0.1 %) nasal spray 2 sprays (274 mcg total) by Nasal route 2 (two) times daily. 30 mL 1    bimatoprost (LATISSE) 0.03 % ophthalmic solution Place 1 application  into both eyes every evening. Place one drop on applicator and apply evenly along the skin of the upper eyelid at base of eyelashes once daily at bedtime; repeat procedure for second eye (use a clean applicator). 5 mL 12    clobetasol 0.05% (TEMOVATE) 0.05 % Oint Apply topically 2 (two) times daily. 60 g 1    coenzyme Q10 100 mg capsule Take 1 capsule (100 mg total) by mouth once daily. 30 capsule 11    COVID-19 (COMIRNATY 2024-25, 12Y UP,,PF,) 30 mcg/0.3 mL IM vaccine (>/= 13 yo) Inject into the muscle. 0.3 mL 0    estradioL (ESTRACE) 0.01 % (0.1 mg/gram) vaginal cream Place 1 g vaginally once daily. 42.5 g 3    estradioL (ESTRACE) 2 MG tablet Take 1 tablet (2 mg total) by mouth once daily. 90 tablet 3    ibuprofen (ADVIL,MOTRIN) 200 MG tablet Take 800 mg by mouth daily  as needed for Pain.      levothyroxine (EUTHYROX) 25 MCG tablet Take 1 tablet (25 mcg total) by mouth once daily. 90 tablet 3    magnesium oxide (MAG-OX) 400 mg (241.3 mg magnesium) tablet Take 1 tablet (400 mg total) by mouth once daily. 30 tablet 11    mupirocin (BACTROBAN) 2 % ointment Apply topically 2 (two) times daily as needed (Rash).      primidone (MYSOLINE) 50 MG Tab Take 1 tablet (50 mg total) by mouth every evening. Start taking 1/2 tablet at bedtime the first week 90 tablet 3    riboflavin, vitamin B2, 400 mg Tab Take 400 mg by mouth once daily. 30 tablet 0    rimegepant 75 mg odt Take 1 tablet (75 mg total) by mouth as needed for Migraine. Place ODT tablet on the tongue; alternatively the ODT tablet may be placed under the tongue 12 tablet 1    rosuvastatin (CRESTOR) 10 MG tablet Take 1 tablet (10 mg total) by mouth nightly. 90 tablet 3    sertraline (ZOLOFT) 100 MG tablet Take 1 tablet (100 mg total) by mouth once daily. 90 tablet 3    triamcinolone acetonide 0.1% (KENALOG) 0.1 % cream Apply to affected areas of body BID prn rash. Do not use on face, underarms, or groin. 80 g 2     Objective     /76   Pulse 64   SpO2 98%   Physical Exam  Constitutional:       General: She is not in acute distress.     Appearance: Normal appearance. She is well-developed.   Neurological:      Mental Status: She is alert.     Assessment and Plan     Essential tremor  -     topiramate (TOPAMAX) 50 MG tablet; Start 1/2 tab nightly for 1 week then increase to 1/2 tab BID, then after 2 weeks increase to 1 tab BID  Dispense: 60 tablet; Refill: 11    Vestibular migraine  -     topiramate (TOPAMAX) 50 MG tablet; Start 1/2 tab nightly for 1 week then increase to 1/2 tab BID, then after 2 weeks increase to 1 tab BID  Dispense: 60 tablet; Refill: 11    Dysfunction of Eustachian tube, unspecified laterality      Will try and treat both ET and prevent vestibular migraines with  topamax.  ___________________  Aravind Jimenez MD  Internal Medicine and Pediatrics

## 2025-06-10 ENCOUNTER — TELEPHONE (OUTPATIENT)
Dept: FAMILY MEDICINE | Facility: CLINIC | Age: 75
End: 2025-06-10
Payer: MEDICARE

## 2025-06-10 NOTE — TELEPHONE ENCOUNTER
Copied from CRM #[id number]. Topic: General Inquiry - Patient Advice  >> Scott 10, 2025 12:00 PM Carmen wrote:  Type: Needs Medical Advice  Who Called:  Daughter - Yuval James  Symptoms (please be specific):  confussion  How long has patient had these symptoms:  na  Pharmacy name and phone #:  na  Best Call Back Number: Yuval [phone]  Additional Information: daughter is calling to speak with someone in the office concerning her mother and what's been going on

## 2025-06-12 ENCOUNTER — TELEPHONE (OUTPATIENT)
Dept: FAMILY MEDICINE | Facility: CLINIC | Age: 75
End: 2025-06-12
Payer: MEDICARE

## 2025-06-12 DIAGNOSIS — F32.5 MAJOR DEPRESSIVE DISORDER WITH SINGLE EPISODE, IN FULL REMISSION: Primary | ICD-10-CM

## 2025-06-12 NOTE — TELEPHONE ENCOUNTER
Copied from CRM #[id number]. Topic: General Inquiry - Patient Advice  >> Jun 12, 2025 12:07 PM Grace wrote:  Type: Needs Medical Advice  Who Called:  pt daughter     Best Call Back Number: [phone] gerald roe   Additional Information: pt daughter requesting call back in regards to previous message please advise

## 2025-06-12 NOTE — TELEPHONE ENCOUNTER
----- Message from Med Assistant Lanesia sent at 6/12/2025 10:22 AM CDT -----    ----- Message -----  From: Jennifer Wilson MA  Sent: 6/11/2025   3:28 PM CDT  To: Tony Nazario Staff    Type: General Call Back  Name of Caller:pt Would the patient rather a call back or a response via [facility]? Call Best Call Back Number: [phone] Information: Pt would like to discuss getting a referral for psychiatry or psychology. She unsure on what to do.  
Home

## 2025-06-13 ENCOUNTER — OFFICE VISIT (OUTPATIENT)
Dept: FAMILY MEDICINE | Facility: CLINIC | Age: 75
End: 2025-06-13
Payer: MEDICARE

## 2025-06-13 VITALS
BODY MASS INDEX: 29.25 KG/M2 | DIASTOLIC BLOOD PRESSURE: 68 MMHG | HEART RATE: 58 BPM | OXYGEN SATURATION: 98 % | SYSTOLIC BLOOD PRESSURE: 126 MMHG | WEIGHT: 186.75 LBS

## 2025-06-13 DIAGNOSIS — R41.3 MEMORY CHANGES: Primary | ICD-10-CM

## 2025-06-13 DIAGNOSIS — F32.A DEPRESSION, UNSPECIFIED DEPRESSION TYPE: ICD-10-CM

## 2025-06-13 PROCEDURE — 99999 PR PBB SHADOW E&M-EST. PATIENT-LVL IV: CPT | Mod: PBBFAC,,, | Performed by: INTERNAL MEDICINE

## 2025-06-13 PROCEDURE — 99214 OFFICE O/P EST MOD 30 MIN: CPT | Mod: PBBFAC,PO | Performed by: INTERNAL MEDICINE

## 2025-06-13 NOTE — PROGRESS NOTES
Subjective     Viv De La Torre is a 75 y.o. old, female here for Memory Loss    74 y/o with PMH of HLD, SVT/PVC's, hypothyroidism, depression, ET, vestibular migraines     Patient is here at the request of her daughter. We got her on the phone today (Yuval James [phone]) and discussed what has been happening the past few months.  She describes some of her mother's unusual behaviors the past few months. She started asking for money, not answering texts, displaying more confusion and memory issues. Her daughter came into town this past weekend and found that she had been scammed out of a lot of money (10k + cash and venmo) from [employer] a scammer who she thought was Kt Hernandez or someone who knew Kt Hernandez.  Her daughter also helped to get her apartment more organized and get her some food while in town.    Review of Systems   Psychiatric/Behavioral:  Positive for depression and memory loss.    All other systems reviewed and are negative.    Medications     Outpatient Medications Marked as Taking for the 6/13/25 encounter (Office Visit) with Aravind Jimenez MD   Medication Sig Dispense Refill    albuterol (PROVENTIL/VENTOLIN HFA) 90 mcg/actuation inhaler Inhale 2 puffs into the lungs every 4 (four) hours as needed for Wheezing or Shortness of Breath (cough). Rescue 18 g 0    atenoloL (TENORMIN) 100 MG tablet Take 1 tablet by mouth once daily 90 tablet 3    azelastine (ASTELIN) 137 mcg (0.1 %) nasal spray 2 sprays (274 mcg total) by Nasal route 2 (two) times daily. 30 mL 1    bimatoprost (LATISSE) 0.03 % ophthalmic solution Place 1 application  into both eyes every evening. Place one drop on applicator and apply evenly along the skin of the upper eyelid at base of eyelashes once daily at bedtime; repeat procedure for second eye (use a clean applicator). 5 mL 12    clobetasol 0.05% (TEMOVATE) 0.05 % Oint Apply topically 2 (two) times daily. 60 g 1    coenzyme Q10 100 mg capsule Take 1 capsule (100 mg total) by  mouth once daily. 30 capsule 11    COVID-19 (COMIRNATY 2024-25, 12Y UP,,PF,) 30 mcg/0.3 mL IM vaccine (>/= 13 yo) Inject into the muscle. 0.3 mL 0    estradioL (ESTRACE) 0.01 % (0.1 mg/gram) vaginal cream Place 1 g vaginally once daily. 42.5 g 3    estradioL (ESTRACE) 2 MG tablet Take 1 tablet (2 mg total) by mouth once daily. 90 tablet 3    ibuprofen (ADVIL,MOTRIN) 200 MG tablet Take 800 mg by mouth daily as needed for Pain.      levothyroxine (EUTHYROX) 25 MCG tablet Take 1 tablet (25 mcg total) by mouth once daily. 90 tablet 3    magnesium oxide (MAG-OX) 400 mg (241.3 mg magnesium) tablet Take 1 tablet (400 mg total) by mouth once daily. 30 tablet 11    mupirocin (BACTROBAN) 2 % ointment Apply topically 2 (two) times daily as needed (Rash).      primidone (MYSOLINE) 50 MG Tab Take 1 tablet (50 mg total) by mouth every evening. Start taking 1/2 tablet at bedtime the first week 90 tablet 3    riboflavin, vitamin B2, 400 mg Tab Take 400 mg by mouth once daily. 30 tablet 0    rimegepant 75 mg odt Take 1 tablet (75 mg total) by mouth as needed for Migraine. Place ODT tablet on the tongue; alternatively the ODT tablet may be placed under the tongue 12 tablet 1    rosuvastatin (CRESTOR) 10 MG tablet Take 1 tablet (10 mg total) by mouth nightly. 90 tablet 3    sertraline (ZOLOFT) 100 MG tablet Take 1 tablet (100 mg total) by mouth once daily. 90 tablet 3    topiramate (TOPAMAX) 50 MG tablet Start 1/2 tab nightly for 1 week then increase to 1/2 tab BID, then after 2 weeks increase to 1 tab BID 60 tablet 11    triamcinolone acetonide 0.1% (KENALOG) 0.1 % cream Apply to affected areas of body BID prn rash. Do not use on face, underarms, or groin. 80 g 2     Objective     /68   Pulse (!) 58   Wt 84.7 kg (186 lb 11.7 oz)   SpO2 98%   BMI 29.25 kg/m²   Physical Exam  Constitutional:       General: She is not in acute distress.     Appearance: Normal appearance. She is well-developed.   Neurological:      Mental  Status: She is alert.       Assessment and Plan     Memory changes  -     Ambulatory referral/consult to Neurology; Future; Expected date: 06/20/2025    Depression, unspecified depression type      Referral placed previously to psychology.  She lives in [place] and will need to be seen there.  Patient seems to recognize the fact that she was scammed out of money due to her depression/loneliness, lack of IT knowledge, and memory issues. Family has appropriately helped get her on the right track and take over her finances.    ___________________  Aravind Jimenez MD  Internal Medicine and Pediatrics

## 2025-06-16 ENCOUNTER — PATIENT MESSAGE (OUTPATIENT)
Dept: ADMINISTRATIVE | Facility: HOSPITAL | Age: 75
End: 2025-06-16
Payer: MEDICARE

## 2025-06-17 ENCOUNTER — OFFICE VISIT (OUTPATIENT)
Dept: NEUROLOGY | Facility: CLINIC | Age: 75
End: 2025-06-17
Payer: MEDICARE

## 2025-06-17 VITALS
BODY MASS INDEX: 28.82 KG/M2 | HEART RATE: 42 BPM | WEIGHT: 184 LBS | SYSTOLIC BLOOD PRESSURE: 115 MMHG | DIASTOLIC BLOOD PRESSURE: 55 MMHG

## 2025-06-17 DIAGNOSIS — R41.89 COGNITIVE IMPAIRMENT: Primary | ICD-10-CM

## 2025-06-17 DIAGNOSIS — F32.A DEPRESSION, UNSPECIFIED DEPRESSION TYPE: ICD-10-CM

## 2025-06-17 PROCEDURE — 99214 OFFICE O/P EST MOD 30 MIN: CPT | Mod: PBBFAC | Performed by: STUDENT IN AN ORGANIZED HEALTH CARE EDUCATION/TRAINING PROGRAM

## 2025-06-17 PROCEDURE — 99215 OFFICE O/P EST HI 40 MIN: CPT | Mod: S$PBB,,, | Performed by: STUDENT IN AN ORGANIZED HEALTH CARE EDUCATION/TRAINING PROGRAM

## 2025-06-17 PROCEDURE — 99999 PR PBB SHADOW E&M-EST. PATIENT-LVL IV: CPT | Mod: PBBFAC,,, | Performed by: STUDENT IN AN ORGANIZED HEALTH CARE EDUCATION/TRAINING PROGRAM

## 2025-06-17 NOTE — PROGRESS NOTES
Chief Complaint and Duration     Chief Complaint   Patient presents with    Consult     Depression brought on by my untruthfulness and being scammed out of thousands dollars of money . It's been embarrassing and life changing       History of Present Illness     Viv De La Torre is a 75 y.o. female. Hx of depression.     Got scammed during new years.   Per note in PCP chart as well:  Phone today (Yuval James [phone]) and discussed what has been happening the past few months.  She describes some of her mother's unusual behaviors the past few months. She started asking for money, not answering texts, displaying more confusion and memory issues. Her daughter came into town this past weekend and found that she had been scammed out of a lot of money (10k + cash and venmo) from [employer] a scammer who she thought was Kt Hernandez or someone who knew Kt Haskinsrow.    Daughter manages finances. 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    Patient herself recognizes she was scammed out of memory due to her depression.    Lives alone, able to put clothes on, hygiene. Daughter helps w finances.    Review of patient's allergies indicates:   Allergen Reactions    Penicillins Other (See Comments)     Other reaction(s): Unknown     Current Medications[1]    Medical History     Past Medical History:   Diagnosis Date    AK (actinic keratosis)     Allergy     Seasonal    Anxiety     Arthritis of both knees 04/22/2015    Basal cell carcinoma     Basal cell carcinoma 10/06/2021    Right upper back    Cataract     Depression     Dizziness     Fibrocystic breast     Fracture of lateral malleolus     High cholesterol     Hypothyroidism due to acquired atrophy of thyroid     Nuclear sclerosis - Both Eyes 05/06/2013    Osteoarthritis 2012    [place]    SVT (supraventricular tachycardia)      Past Surgical History:   Procedure Laterality Date    ANKLE FRACTURE SURGERY  2013    Taiwo Macias MD    BREAST BIOPSY Left     core bx, b9    CATARACT EXTRACTION W/   INTRAOCULAR LENS IMPLANT Right 12/19/2019    Procedure: EXTRACTION, CATARACT, WITH IOL INSERTION;  Surgeon: Aleyda Acosta MD;  Location: [facility], [place] OR;  Service: Ophthalmology;  Laterality: Right;  LASER ASSISTED    CATARACT EXTRACTION W/  INTRAOCULAR LENS IMPLANT Left 01/16/2020    Procedure: EXTRACTION, CATARACT, WITH IOL INSERTION;  Surgeon: Aleyda Acosta MD;  Location: [facility], [place] OR;  Service: Ophthalmology;  Laterality: Left;    COLONOSCOPY  08/21/2003    COLONOSCOPY N/A 05/06/2021    Procedure: COLONOSCOPY;  Surgeon: Lon Chapin MD;  Location: [facility] ENDO;  Service: Endoscopy;  Laterality: N/A;    ELBOW FRACTURE SURGERY  2004    Lang Daniels MD    ELBOW SURGERY  2004    Lang Daniels MD    EYE SURGERY  Cataract    FRACTURE SURGERY  Left ankle/ leg- left elbow/arm    fx arm      HUMERUS FRACTURE SURGERY  2004    Lang Daniels MD    HYSTERECTOMY      SKIN BIOPSY  2020    Renee Lopez MD @[facility]     Family History   Problem Relation Name Age of Onset    Cancer Mother Jennifer Sr         lung cancer    Cancer Father Cameron Sr         renal cancer    Heart disease Sister Leilani De Jesus     Hypertension Sister Leilani De Jesus     Heart disease Brother Cameron Sr Jr     Hypertension Brother Cameron Sr Jr     Cataracts Maternal Grandmother      Cancer Sister Polina Sr     Melanoma Neg Hx      Skin cancer Neg Hx      Amblyopia Neg Hx      Blindness Neg Hx      Diabetes Neg Hx      Glaucoma Neg Hx      Macular degeneration Neg Hx      Retinal detachment Neg Hx      Strabismus Neg Hx      Stroke Neg Hx      Thyroid disease Neg Hx       Social History[2]    Exam     Vitals:    06/17/25 0737   BP: (!) 115/55   Pulse: (!) 42      Physical Exam:  General: Not in acute distress. Not ill-appearing.   HENT: Normocephalic and atraumatic. Moist mucous membranes.  Eyes: Conjunctivae normal.   Pulmonary: Pulmonary effort is normal.   Skin: Skin is warm and dry. No rashes.    Psychiatric: Mood normal.        Neurologic Exam   Mental status: oriented to person, place, and time  Attention: Normal. Concentration: normal.  Speech: speech is normal.    Motor exam: bulk and tone normal.     Labs and Imaging     Labs: reviewed  No results found for this or any previous visit (from the past 24 hours).    Thyroid normal      Imaging:   I have personally reviewed the images performed.   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    Patient came by herself today, new to me, extremely pleasant.    Concern for scamming incident around new years. Daughter concerned for more memory issues that have come up.  Lives by herself, largely able to take care of ADLs, daughter manages finances however.     MOCA 21/30 today, mri reviewed w patient, patient endorses significant depression. Discussed w patient can refer to psych or neuropsych. Patient saw psych back in 2020 for depression.  Discussed w patient multifactorial nature of symptoms and if she feels depression significant impact, may be beneficial to evaluation and management.     Discussed lifestyle modifications including diet and exercise.    Questions answered w patient. Appreciate opportunity to care for this patient.    Follow-up: can followup w me after psych and neuropsych.     Time spent on this encounter: 50 minutes. This includes face to face time and non-face to face time preparing to see the patient (eg, review of tests), obtaining and/or reviewing separately obtained history, documenting clinical information in the electronic or other health record,  independently interpreting results and communicating results to the patient/family/caregiver, or care coordinator.     This note was created by combination of typed  and M-Modal dictation. Transcription and phonetic errors may be present.  If there are any questions, please contact me.         [1]   Current Outpatient Medications   Medication Sig Dispense Refill    albuterol (PROVENTIL/VENTOLIN HFA) 90 mcg/actuation inhaler Inhale 2 puffs into the lungs every 4 (four) hours as needed for Wheezing or Shortness of Breath (cough). Rescue 18 g 0    atenoloL (TENORMIN) 100 MG tablet Take 1 tablet by mouth once daily 90 tablet 3    azelastine (ASTELIN) 137 mcg (0.1 %) nasal spray 2 sprays (274 mcg total) by Nasal route 2 (two) times daily. 30 mL 1    bimatoprost (LATISSE) 0.03 % ophthalmic solution Place 1 application  into both eyes every evening. Place one drop on applicator and apply evenly along the skin of the upper eyelid at base of eyelashes once daily at bedtime; repeat procedure for second eye (use a clean applicator). 5 mL 12    clobetasol 0.05% (TEMOVATE) 0.05 % Oint Apply topically 2 (two) times daily. 60 g 1    coenzyme Q10 100 mg capsule Take 1 capsule (100 mg total) by mouth once daily. 30 capsule 11    COVID-19 (COMIRNATY 2024-25, 12Y UP,,PF,) 30 mcg/0.3 mL IM vaccine (>/= 13 yo) Inject into the muscle. 0.3 mL 0    estradioL (ESTRACE) 0.01 % (0.1 mg/gram) vaginal cream Place 1 g vaginally once daily. 42.5 g 3    estradioL (ESTRACE) 2 MG tablet Take 1 tablet (2 mg total) by mouth once daily. 90 tablet 3    ibuprofen (ADVIL,MOTRIN) 200 MG tablet Take 800 mg by mouth daily as needed for Pain.      levothyroxine (EUTHYROX) 25 MCG tablet Take 1 tablet (25 mcg total) by mouth once daily. 90 tablet 3    magnesium oxide (MAG-OX) 400 mg (241.3 mg magnesium) tablet Take 1 tablet (400 mg total) by mouth once daily. 30 tablet 11    mupirocin (BACTROBAN) 2 % ointment Apply topically 2 (two) times daily as  needed (Rash).      primidone (MYSOLINE) 50 MG Tab Take 1 tablet (50 mg total) by mouth every evening. Start taking 1/2 tablet at bedtime the first week 90 tablet 3    riboflavin, vitamin B2, 400 mg Tab Take 400 mg by mouth once daily. 30 tablet 0    rimegepant 75 mg odt Take 1 tablet (75 mg total) by mouth as needed for Migraine. Place ODT tablet on the tongue; alternatively the ODT tablet may be placed under the tongue 12 tablet 1    rosuvastatin (CRESTOR) 10 MG tablet Take 1 tablet (10 mg total) by mouth nightly. 90 tablet 3    sertraline (ZOLOFT) 100 MG tablet Take 1 tablet (100 mg total) by mouth once daily. 90 tablet 3    topiramate (TOPAMAX) 50 MG tablet Start 1/2 tab nightly for 1 week then increase to 1/2 tab BID, then after 2 weeks increase to 1 tab BID 60 tablet 11    triamcinolone acetonide 0.1% (KENALOG) 0.1 % cream Apply to affected areas of body BID prn rash. Do not use on face, underarms, or groin. 80 g 2     No current facility-administered medications for this visit.   [2]   Social History  Socioeconomic History    Marital status:    Occupational History    Occupation: Sales   Tobacco Use    Smoking status: Never    Smokeless tobacco: Never   Substance and Sexual Activity    Alcohol use: Not Currently     Alcohol/week: 7.0 - 8.0 standard drinks of alcohol     Types: 4 - 5 Glasses of wine, 3 Cans of beer per week     Comment: Not every week Not every day    Drug use: No    Sexual activity: Yes     Partners: Male     Birth control/protection: Post-menopausal     Comment: Total hysterectomy 2004     Social Drivers of Health     Financial Resource Strain: Low Risk  (3/25/2025)    Overall Financial Resource Strain (CARDIA)     Difficulty of Paying Living Expenses: Not hard at all   Food Insecurity: No Food Insecurity (3/25/2025)    Hunger Vital Sign     Worried About Running Out of Food in the Last Year: Never true     Ran Out of Food in the Last Year: Never true   Transportation Needs: No  Transportation Needs (3/25/2025)    PRAPARE - Transportation     Lack of Transportation (Medical): No     Lack of Transportation (Non-Medical): No   Physical Activity: Sufficiently Active (3/25/2025)    Exercise Vital Sign     Days of Exercise per Week: 3 days     Minutes of Exercise per Session: 90 min   Stress: No Stress Concern Present (3/25/2025)    [employer] - Occupational Stress Questionnaire     Feeling of Stress : Not at all   Housing Stability: Low Risk  (3/25/2025)    Housing Stability Vital Sign     Unable to Pay for Housing in the Last Year: No     Number of Times Moved in the Last Year: 0     Homeless in the Last Year: No

## 2025-06-30 ENCOUNTER — PATIENT OUTREACH (OUTPATIENT)
Dept: ADMINISTRATIVE | Facility: HOSPITAL | Age: 75
End: 2025-06-30
Payer: MEDICARE

## 2025-06-30 DIAGNOSIS — Z12.31 ENCOUNTER FOR SCREENING MAMMOGRAM FOR MALIGNANT NEOPLASM OF BREAST: Primary | ICD-10-CM

## 2025-06-30 NOTE — PROGRESS NOTES
Population Health Chart Review & Patient Outreach Details      Additional [facility] Health Notes:               Updates Requested / Reviewed:      Updated Care Coordination Note         Health Maintenance Topics Overdue:      VBHM Score: 0     Patient is not due for any topics at this time.                       Health Maintenance Topic(s) Outreach Outcomes & Actions Taken:    Breast Cancer Screening - Outreach Outcomes & Actions Taken  : Mammogram Order Placed
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
## 2025-07-08 ENCOUNTER — OFFICE VISIT (OUTPATIENT)
Dept: NEUROLOGY | Facility: CLINIC | Age: 75
End: 2025-07-08
Payer: MEDICARE

## 2025-07-08 DIAGNOSIS — F43.21 ADJUSTMENT DISORDER WITH DEPRESSED MOOD: ICD-10-CM

## 2025-07-08 DIAGNOSIS — G31.84 MCI (MILD COGNITIVE IMPAIRMENT): Primary | ICD-10-CM

## 2025-07-08 PROCEDURE — 90791 PSYCH DIAGNOSTIC EVALUATION: CPT | Mod: 95,,, | Performed by: CLINICAL NEUROPSYCHOLOGIST

## 2025-07-08 PROCEDURE — 99499 UNLISTED E&M SERVICE: CPT | Mod: S$PBB,,, | Performed by: CLINICAL NEUROPSYCHOLOGIST

## 2025-07-08 NOTE — PROGRESS NOTES
NEUROPSYCHOLOGY CONSULT    Name Viv De La Torre   MRN [id number]    1950   Age 75 y.o.   Gender female   Ref Provider  Viv Huntley MD  [address] [facility] [address]  [place]  [place] [address]   CC/Med. Necessity Cognitive concerns   Visit Type  Virtual visit with synchronous audio and video  Virtual visit with audio only (telephone)    Time Spent ***   Telemedicine Each patient to whom he or she provides medical services by telemedicine is:  (1) informed of the relationship between the physician and patient and the respective role of any other health care provider with respect to management of the patient; and (2) notified that he or she may decline to receive medical services by telemedicine and may withdraw from such care at any time   Consent The patient *** an understanding of the purpose of the evaluation and consented to all procedures. I *** the patient of limits to confidentiality and discussed an emergency plan.    This note was generated with the assistance of ambient listening technology. Verbal consent was obtained by the patient and accompanying visitor(s) for the recording of patient appointment to facilitate this note. I attest to having reviewed and edited the generated note for accuracy, though some syntax or spelling errors may persist. Please contact the author of this note for any clarification.          SUMMARY/TREATMENT PLAN   Results from the interview indicate the following diagnoses and treatment plan recommendations. This was discussed and the patient *** follow a treatment plan   independently.    Diagnoses/Plan:  Problem List Items Addressed This Visit    None      Assessment & Plan                HPI AND CURRENT SYMPTOMS   Active problems noted below.    History of Present Illness                Cognitive Symptoms   Onset: ***   Course: ***     Current Functional Status/Needs   ADLs:   Self-Care Eating Dressing/Mobility Safety   Bathing: Independent  Bathroom: Independent  Other: Independent  Independent Independent     Instrumental IADLs:    Driving Medications/Health Household Finances   *** *** *** ***     Current Psychiatric and Behavioral Symptoms   Mood:   Depression/Dysphoria Anxiety/Fearfulness Irritability   *** *** ***     Behavior:   Agitation/Resistance Delusions/Paranoia Hallucinations   None None None   Apathy/Motivation Repetitive/Restlessness Other   None None None     Neurovegetative:   Sleep/Nighttime  Appetite Energy   *** *** ***     Current Physical Concerns   Motor: Sensory Other   *** *** ***     PERTINENT BACKGROUND INFORMATION   Social History   Family Status    Current Living Situation:    Primary Source of Support    Daily Activities:    Stressors    Educational Status    Occupational Status    Family History   Neurologic History No known heritable risk factors   Psychiatric History No known heritable risk factors     MEDICAL STATUS   Problem List[1]  Past Medical History:   Diagnosis Date    AK (actinic keratosis)     Allergy     Seasonal    Anxiety     Arthritis of both knees 04/22/2015    Basal cell carcinoma     Basal cell carcinoma 10/06/2021    Right upper back    Cataract     Depression     Dizziness     Fibrocystic breast     Fracture of lateral malleolus     High cholesterol     Hypothyroidism due to acquired atrophy of thyroid     Nuclear sclerosis - Both Eyes 05/06/2013    Osteoarthritis 2012    [facility]    SVT (supraventricular tachycardia)      Past Surgical History:   Procedure Laterality Date    ANKLE FRACTURE SURGERY  2013    Taiwo Macias MD    BREAST BIOPSY Left     core bx, b9    CATARACT EXTRACTION W/  INTRAOCULAR LENS IMPLANT Right 12/19/2019    Procedure: EXTRACTION, CATARACT, WITH IOL INSERTION;  Surgeon: Aleyda Acosta MD;  Location: [facility];  Service: Ophthalmology;  Laterality: Right;  LASER ASSISTED    CATARACT EXTRACTION W/  INTRAOCULAR LENS IMPLANT Left 01/16/2020    Procedure: EXTRACTION, CATARACT, WITH IOL INSERTION;  Surgeon: Aleyda Acosta MD;   "Location: [facility] OR;  Service: Ophthalmology;  Laterality: Left;    COLONOSCOPY  08/21/2003    COLONOSCOPY N/A 05/06/2021    Procedure: COLONOSCOPY;  Surgeon: Lon Chapin MD;  Location: [facility] ENDO;  Service: Endoscopy;  Laterality: N/A;    ELBOW FRACTURE SURGERY  2004    Lang Daniels MD    ELBOW SURGERY  2004    Lang Daniels MD    EYE SURGERY  Cataract    FRACTURE SURGERY  Left ankle/ leg- left elbow/arm    fx arm      HUMERUS FRACTURE SURGERY  2004    Lang Daniels MD    HYSTERECTOMY      SKIN BIOPSY  2020    Renee Lopez MD @[facility]        Relevant Neurologic History   Falls NA   TBIs NA   Seizures NA   CVAs NA   Movement Concerns NA   Other NA     Relevant Labs   No results found for: "WSYQOGFV90"  No results found for: "RPR"  No results found for: "FOLATE"  Lab Results   Component Value Date    TSH 3.921 08/20/2024     Lab Results   Component Value Date    HGBA1C 5.3 10/28/2016     Lab Results   Component Value Date    UBH09TDYP Non-reactive 08/20/2024        No results found for: "UPZHJTQT140", "ADEVLRATIO", "CCAH880", "ATNPNFL"      Neurodiagnostics   Results for orders placed or performed during the hospital encounter of 08/20/24   MRI Brain Without Contrast    Narrative    EXAMINATION:  MRI BRAIN WITHOUT CONTRAST    CLINICAL HISTORY:  Dizziness, persistent/recurrent, cardiac or vascular cause suspected;    TECHNIQUE:  Multiplanar multisequence MR imaging of the brain was performed without contrast.    COMPARISON:  None.    FINDINGS:  There is no abnormal restricted diffusion to suggest acute infarction. The ventricles are normal in size and configuration for age without evidence for hydrocephalus.  There is mild generalized cerebral volume loss.  There is scattered T2/FLAIR hyperintensity within the supratentorial white matter, nonspecific but likely reflecting sequela of chronic microvascular ischemic change.  No evidence of acute parenchymal hemorrhage.  Small focus of remote hemosiderin deposition " in the paramedian left frontal lobe.  No extra-axial hemorrhage or fluid collections. The craniocervical junction and sellar regions are within normal limits.      Impression    Noncontrast MRI demonstrates no acute intracranial abnormality.  Specifically, no evidence of acute infarct.    T2/FLAIR hyperintensity in the supratentorial white matter, nonspecific but likely reflecting sequela of chronic microvascular ischemic change.      Electronically signed by: Sasha Fitzgerald MD  Date:    08/21/2024  Time:    02:36   CT Head Without Contrast    Addendum: 8/23/2024      Alphonso Reyes MD, first observed the critical findings on 08/21/2024 at approximately 04:48, and sent the results to Paula Phillips MD, via Epic Secure Chat message on 08/21/2024 at 04:54.  Patient name and medical record number were specified/linked in the message.The recipient immediately responded, acknowledging receipt of the information.      Electronically signed by: Alphonso Reyes  Date:    08/23/2024  Time:    14:19      Narrative    EXAMINATION:  CT HEAD WITHOUT CONTRAST    CLINICAL HISTORY:  Dizziness, persistent/recurrent, cardiac or vascular cause suspected;Mental status change, unknown cause;    TECHNIQUE:  Low dose axial CT images obtained throughout the head without the use of intravenous contrast.  Axial, sagittal and coronal reconstructions were performed.    COMPARISON:  MRI 08/21/2024    FINDINGS:  INTRACRANIAL COMPARTMENT:    Ventricles and sulci are normal in size for age without evidence of hydrocephalus.    Mild patchy hypoattenuation in the supratentorial white matter, nonspecific but most likely reflecting chronic microvascular ischemic changes.    No parenchymal mass, hemorrhage, edema or major vascular distribution infarct.    No extra-axial blood or fluid collections.    SKULL/EXTRACRANIAL CONTENTS (limited evaluation):    No depressed calvarial fracture.   Mastoid air cells and paranasal sinuses are essentially  clear.     images: Grade 1 C2-C3 anterolisthesis; age or etiology not determined on this scan.      Impression    Head CT:    No acute intracranial abnormality.    Sequela of chronic microvascular ischemic change.     images: Grade 1 C2-C3 anterolisthesis; age or etiology not determined on this scan.  Correlate clinically.    Electronically signed by resident: Tom Jean Baptiste  Date:    08/21/2024  Time:    03:09    Electronically signed by: Alphonso Reyes  Date:    08/21/2024  Time:    04:55         Medications   Current Medications[2]     Pertinent Psychiatric History   Sxs:   Longstanding: NA  Intermittent: NA    Substance Use:  Current Use: ***  Prior Problems: None   Treatment Hx:  Medication: None  Current: NA  Pertinent prior:NA  Therapy: None  Current: NA  Pertinent prior: NA  Inpatient: None     BEHAVIORAL OBSERVATIONS   APPEARANCE Casually dressed and adequate grooming/hygiene  ***Not assessed   ALERTNESS/ORIENTATION Attentive and alert.  ***   GAIT Not assessed   SENSORY Not assessed   MOTOR  Not assessed   SPEECH/LANGUAGE Normal in rate, rhythm, tone, and volume. No significant word finding difficulty. Expressive and receptive language was normal.   STATED MOOD/AFFECT Stated mood was *** with congruent affect.   INTERPERSONAL BEHAVIOR Rapport was quickly and easily established   SI/HI None reported   HALLUCINATIONS/DELUSIONS None evidenced or endorsed   THOUGHT PROCESSES/INSIGHT Thoughts seemed logical and goal-directed   TEST TAKING BEHAVIOR / VALIDITY NA     BILLING/CODING   Service Description CPT Code Minutes Units   Psychiatric diagnostic evaluation by physician [id number]  1   Neurobehavioral status exam by physician [id number]  0   Each additional hour by physician [id number]  0     TECHNICIAN NOTES:    Proposed Battery: ***   Feedback Type Feedback Time   ***VV, Phone, In-Clinic  ***30-min, 45-min, 60-min            [1]   Patient Active Problem List  Diagnosis    Hypercholesterolemia    Meniere  disease    Nuclear sclerosis - Both Eyes    Arthritis of both knees    Family history of cardiovascular disease    SVT (supraventricular tachycardia)    Hypothyroidism due to acquired atrophy of thyroid    PVC (premature ventricular contraction)    Elevated LFTs    Alopecia of scalp    Nevus of choroid of right eye    Tremor    Dizziness    Vestibular migraine    Impaired functional mobility, balance, and endurance    Essential tremor    Hormone replacement therapy   [2]   Current Outpatient Medications:     albuterol (PROVENTIL/VENTOLIN HFA) 90 mcg/actuation inhaler, Inhale 2 puffs into the lungs every 4 (four) hours as needed for Wheezing or Shortness of Breath (cough). Rescue, Disp: 18 g, Rfl: 0    atenoloL (TENORMIN) 100 MG tablet, Take 1 tablet by mouth once daily, Disp: 90 tablet, Rfl: 3    azelastine (ASTELIN) 137 mcg (0.1 %) nasal spray, 2 sprays (274 mcg total) by Nasal route 2 (two) times daily., Disp: 30 mL, Rfl: 1    bimatoprost (LATISSE) 0.03 % ophthalmic solution, Place 1 application  into both eyes every evening. Place one drop on applicator and apply evenly along the skin of the upper eyelid at base of eyelashes once daily at bedtime; repeat procedure for second eye (use a clean applicator)., Disp: 5 mL, Rfl: 12    clobetasol 0.05% (TEMOVATE) 0.05 % Oint, Apply topically 2 (two) times daily., Disp: 60 g, Rfl: 1    coenzyme Q10 100 mg capsule, Take 1 capsule (100 mg total) by mouth once daily., Disp: 30 capsule, Rfl: 11    COVID-19 (COMIRNATY 2024-25, 12Y UP,,PF,) 30 mcg/0.3 mL IM vaccine (>/= 13 yo), Inject into the muscle., Disp: 0.3 mL, Rfl: 0    estradioL (ESTRACE) 0.01 % (0.1 mg/gram) vaginal cream, Place 1 g vaginally once daily., Disp: 42.5 g, Rfl: 3    estradioL (ESTRACE) 2 MG tablet, Take 1 tablet (2 mg total) by mouth once daily., Disp: 90 tablet, Rfl: 3    ibuprofen (ADVIL,MOTRIN) 200 MG tablet, Take 800 mg by mouth daily as needed for Pain., Disp: , Rfl:     levothyroxine (EUTHYROX) 25 MCG  tablet, Take 1 tablet (25 mcg total) by mouth once daily., Disp: 90 tablet, Rfl: 3    magnesium oxide (MAG-OX) 400 mg (241.3 mg magnesium) tablet, Take 1 tablet (400 mg total) by mouth once daily., Disp: 30 tablet, Rfl: 11    mupirocin (BACTROBAN) 2 % ointment, Apply topically 2 (two) times daily as needed (Rash)., Disp: , Rfl:     primidone (MYSOLINE) 50 MG Tab, Take 1 tablet (50 mg total) by mouth every evening. Start taking 1/2 tablet at bedtime the first week, Disp: 90 tablet, Rfl: 3    riboflavin, vitamin B2, 400 mg Tab, Take 400 mg by mouth once daily., Disp: 30 tablet, Rfl: 0    rimegepant 75 mg odt, Take 1 tablet (75 mg total) by mouth as needed for Migraine. Place ODT tablet on the tongue; alternatively the ODT tablet may be placed under the tongue, Disp: 12 tablet, Rfl: 1    rosuvastatin (CRESTOR) 10 MG tablet, Take 1 tablet (10 mg total) by mouth nightly., Disp: 90 tablet, Rfl: 3    sertraline (ZOLOFT) 100 MG tablet, Take 1 tablet (100 mg total) by mouth once daily., Disp: 90 tablet, Rfl: 3    topiramate (TOPAMAX) 50 MG tablet, Start 1/2 tab nightly for 1 week then increase to 1/2 tab BID, then after 2 weeks increase to 1 tab BID, Disp: 60 tablet, Rfl: 11    triamcinolone acetonide 0.1% (KENALOG) 0.1 % cream, Apply to affected areas of body BID prn rash. Do not use on face, underarms, or groin., Disp: 80 g, Rfl: 2

## 2025-07-11 ENCOUNTER — PATIENT MESSAGE (OUTPATIENT)
Dept: FAMILY MEDICINE | Facility: CLINIC | Age: 75
End: 2025-07-11
Payer: MEDICARE

## 2025-07-13 ENCOUNTER — PATIENT MESSAGE (OUTPATIENT)
Dept: PRIMARY CARE CLINIC | Facility: CLINIC | Age: 75
End: 2025-07-13
Payer: MEDICARE

## 2025-07-13 PROBLEM — R41.89 COGNITIVE CHANGES: Status: ACTIVE | Noted: 2025-07-13

## 2025-07-13 PROBLEM — F43.21 ADJUSTMENT DISORDER WITH DEPRESSED MOOD: Status: ACTIVE | Noted: 2025-07-13

## 2025-07-14 NOTE — ASSESSMENT & PLAN NOTE
Assessment:  - Pt reports increased sadness following the end of an online relationship she learned was a financial scam, as well as after moving to a new neighborhood where she no longer has access to close friends she previously socialized with (e.g., dining out, playing tennis). She expresses a desire to be more socially engaged and to be in a relationship.  Plan:  -Neuropsych testing to assess mood symptoms impact on cognitive status, differential diagnosis, and treatment plan

## 2025-07-14 NOTE — ASSESSMENT & PLAN NOTE
Assessment:  Onset/Course: Pt denies significant cognitive changes, noting she may forget things she considers unimportant but is not concerned.    Family reports gradual memory decline over the past few years, with worsening symptoms. Primary concern is impaired judgment and behavioral changes over the past year. Although some disinhibition was present at baseline, family notes increased inappropriate behavior.    Pt lives alone in an apartment.     Sxs: Pt reports subtle forgetfulness, which she feels is longstanding and limited to unimportant details. Daughter has noted concerns. Patient has had some difficulty learning to use her new iPhone and has sought help at the [employer] multiple times.  Dtr reports difficulty making new memories and frequent repetition, with limited benefit from cues. Self-care has declined, as she intermittently appears disheveled. She had stopped buying groceries, but family has since increased oversight.     Day to day, pt reports cognition is stable and she doesn't notice any exacerbating factors. Her daughter relayed that the pt's daughter-in-law notices variable presentation, with 'good days' when the patient is well-kempt and oriented, and 'bad days' when she appears confused and disheveled. Her thinking is reportedly clearest in the middle of the day.    Observationally, she demonstrated clear cognitive impairment during the initial interview.      Plan:  -Neuropsych testing to assess cognitive status, differential diagnosis, and treatment plan

## 2025-07-16 ENCOUNTER — TELEPHONE (OUTPATIENT)
Dept: NEUROLOGY | Facility: CLINIC | Age: 75
End: 2025-07-16
Payer: MEDICARE

## 2025-07-16 NOTE — TELEPHONE ENCOUNTER
Attempted to call pt's daughter. No answer, left vm and will attempt to call again tomorrow morning.

## 2025-07-17 ENCOUNTER — TELEPHONE (OUTPATIENT)
Dept: PSYCHIATRY | Facility: CLINIC | Age: 75
End: 2025-07-17
Payer: MEDICARE

## 2025-07-17 ENCOUNTER — OFFICE VISIT (OUTPATIENT)
Dept: PSYCHIATRY | Facility: CLINIC | Age: 75
End: 2025-07-17
Payer: MEDICARE

## 2025-07-17 DIAGNOSIS — R41.89 COGNITIVE CHANGES: Chronic | ICD-10-CM

## 2025-07-17 DIAGNOSIS — F32.A DEPRESSION, UNSPECIFIED DEPRESSION TYPE: Primary | Chronic | ICD-10-CM

## 2025-07-17 NOTE — PROGRESS NOTES
"  Subjective:       Patient ID: Viv De La Torre is a 75 y.o. female.    PSYCHIATRY ENCOUNTER  7/17/2025      SERVICE: General Adult  ENCOUNTER: initial    CHIEF COMPLAINT: Patient presents for evaluation of depression and memory issues, as referred by neurology.    TELEPSYCHIATRY (AUDIOVISUAL): Each patient who is provided psychiatric services via telehealth is: (1) informed of the relationship between the psychiatric provider and patient, as well as the respective role of any other health care staff/providers with respect to management of the patient; and (2) notified that he or she may decline to receive psychiatric services by telehealth and may withdraw from such care at any time.  Risks of telehealth include the potential for security breaches (HIPPA compliant platforms notwithstanding) and technological failure, as well as the limitations to physical examination inherent to the modality. The patient was agreeable to the use of telehealth services.    START TIME: 7/17/2025 3:04 PM  STOP TIME: 7/17/2025 4:05 PM  TOTAL ENCOUNTER TIME (in minutes): 99  TIME WITH PATIENT (in minutes): 61    -- PATIENT IDENTIFIERS: Viv De La Torre  [id number]  1950  75 y.o.  female  -- LOCATION OF PATIENT: community    -- MODE OF ARRIVAL: N/A  -- PRESENT WITH PATIENT DURING SESSION: family/friend(s) (Jimmie, son)  -- SOURCES OF INFORMATION: PATIENT, EHR/chart, family/friend(s)  -- LOCATION OF ENCOUNTER PROVIDER: [place], [place]    -- ENCOUNTER PROVIDER: lEisha German MD       Reason for Encounter:   Referral from Neurology    History of Present Illness:     6/17/2025, Neurology, Office Visit  "Got scammed during new years.   Per note in PCP chart as well:  Phone today (Yuval James [phone]) and discussed what has been happening the past few months.  She describes some of her mother's unusual behaviors the past few months. She started asking for money, not answering texts, displaying more confusion and memory issues. Her " "daughter came into town this past weekend and found that she had been scammed out of a lot of money (10k + cash and venmo) from [employer] a scammer who she thought was Kt Hernandez or someone who knew Kt Hernandez.     Daughter manages finances. 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     Patient herself recognizes she was scammed out of memory due to her depression.     Lives alone, able to put clothes on, hygiene. Daughter helps w finances."    07/17/2025, Today, Initial psych eval  75 y.o. female Referred to Psychiatry by Neurology due to concerns about memory and depression.  During evaluation, patient was evaluated  1 on 1 for majority of sessions.   Per patient report, depression as well controlled on sertraline.   Patient discussed being taken in by a scam and losing a significant amount of money but did not disclose signs/symptoms consistent with psychosis.  Patient's son joined for the last 10 minutes of the session.   Son expressed concern about patient's behavior when family discovered the scam.    Patient was recently involved in a scam where she believed she was communicating with [facility] quarterback Kt David on the internet. This incident lasted approximately 5-6 months and resulted in the loss of about $10,000 through cash and Venmo transactions. During this time, she acted out of character and did things she later regretted significantly.  Per son, patient struggled to accept that she was not truly communicating/dating Kt Hernandez.  Currently, patient states she is aware that it was a scam.  Patient is dismissive of incident. States that she felt that it was fun.  Unclear if this was isolated incident.  Son could not give example other examples of impaired reality testing. Patient appears to be minimizing significance of event.    Patient has been experiencing increased confusion and memory problems. A recent MOCA (Gerton Cognitive Assessment) test resulted in a score of 21 out of 30, indicating some cognitive " "impairment. She has been referred for neuropsychological testing by neurology. Despite memory concerns, the patient reports maintaining an active lifestyle. She engages in vigorous walking, sometimes twice daily, covering up to 6 miles total. She describes her sleep as restful, typically getting 8-10 hours per night.    Patient reports a history of depression for which she has been taking Sertraline (Zoloft) for 25 years. She currently describes her mood as "good" and denies feeling depressed. However, her son, who was present at end of encounter, reports that she frequently expresses concerns about forgetting things and mentions that she weeps and expresses sadness "a good bit" recently.     Inconsistencies in patient's history were not discovered until end of encounter.  Patient and son agreed to a f/u in 2 weeks to further explore mood and r/o psychosis    Walks up to 2x/day, up to 4 miles and 2 miles later  Gave up tennis at [age] yo due to ankle/foot energy  Moved to [place] after  New environment, but still lifestyle change    Symptom Status: unable to assess  due to inconsistencies between patient and son.  Adverse Effects: denies  Compliance: yes     PSYCHIATRIC ROS: see HPI  Depression rating (0 = none, 10 = worst): 5  Reports: no depressed mood, no anhedonia, no amotivation, no hopelessness, no helplessness, no worthlessness  No  Anxiety rating (0 = none, 10 = worst): 0  Reports: no generalized anxiety/worry, no panic attacks, no ruminations, no restlessness, not keyed up/on edge  Denies irritabiilty/mm tension  Sleep: intact, 8-10 hours qhs, restful  Energy: intact  Appetite: intact  Focus/Concentration: baseline  Bridget: NO  Psychosis: NO  Trauma: NO  Eating Disorder: none     Review of Systems   HENT:  Positive for hearing loss and tinnitus.    Eyes:         Macular degeneration     Neurological:  Negative for headaches (h/o migraine).   All 12 systems otherwise negative.      Past Medical " History[1]    Past Surgical History[2]     Hx of Seizure: no  Hx of Significant Head Injury (e.g., Loss of Consciousness, Concussion, Coma): no    PAST PSYCHIATRIC HISTORY  Past Psychiatric Diagnoses:  depression  Past Psychiatric Medications:    - sertaline since 2000  Inpatient Hospitalizations:  denies  Outpatient treatment:    - Dr. Resendez- 2000 - 2010  Suicide attempts: denies  SIB: denies  Violence: denies  Physical abuse: denies  Sexual abuse: denies    SUBSTANCE USE HISTORY  Nicotine: denies  Alcohol: 1 glass of wine q 1-2 weeks; denies h/o heavy use or w/d  Cannabis: last use >1 year, rare  Illicits: denies  Rehab/Detox: denies    FAMILY PSYCHIATRIC HISTORY  Sister: substance use  Denies h/o suicide in family    SOCIAL HISTORY  Housing status: patient lives alone*  Marital status: ; no partner  Education: some college; degree in [employer]  Employment Status: retired, self-employed landscaping company  Children:  2 daughters, 1 son  Pentecostalism:  none  :  none  Legal:  denies    Objective:       DIAGNOSTIC TESTING:     Wt Readings from Last 3 Encounters:   06/17/25 83.5 kg (184 lb)   06/13/25 84.7 kg (186 lb 11.7 oz)   03/28/25 86.7 kg (191 lb 4 oz)     BP Readings from Last 1 Encounters:   06/17/25 (!) 115/55     Pulse Readings from Last 1 Encounters:   06/17/25 (!) 42         Glu 85  8/20/2024  Li *   *  TSH 3.921  8/20/2024    HgA1c *   *  VPA *   *   FT4 *   *    Na 139  8/20/2024  CLZ *   *  WBC 6.99  8/20/2024    Cr 0.9  8/20/2024  ANC 3.5; 50.0;   8/20/2024   Hgb 13.3  8/20/2024     BUN 17  8/20/2024  Trop I *   *  HCT 39.7  8/20/2024     GFR >60.0  8/20/2024   CPK *   *    8/20/2024     Alb 3.6  8/20/2024   PRL *   *  B12 *   *     T Bili 0.3  8/20/2024  Chol 160  4/12/2023  B9 *   *    ALP 79  8/20/2024  TGs 165 (H)  4/12/2023  B1 *   *    AST 27  8/20/2024  HDL 54  4/12/2023  Vit D *   *     ALT 30  8/20/2024  LDL 73.0  4/12/2023  HIV  "Non-reactive  8/20/2024     INR *   *  Kaylen *   *   Hep C Non-reactive  8/20/2024    GGT *   *  Lip *   *  RPR *   *    MCV 95  8/20/2024   NH4 *   *  UPT *   *      PETH *   *  THC Negative  8/21/2024    ETOH *   *  KATHERINE Negative  8/21/2024    EtG *   *  AMP Negative  8/21/2024    ALC *   *  OPI Negative  8/21/2024    BZO Negative  8/21/2024  MTD Negative  8/21/2024     BAR Negative  8/21/2024  BUP *   *    PCP Negative  8/21/2024  FEN *   *     Results for orders placed or performed during the hospital encounter of 08/20/24   EKG 12-lead    Collection Time: 08/20/24  8:33 PM   Result Value Ref Range    QRS Duration 96 ms    OHS QTC Calculation 422 ms    Narrative    Test Reason : R42,    Vent. Rate : 052 BPM     Atrial Rate : 052 BPM     P-R Int : 134 ms          QRS Dur : 096 ms      QT Int : 454 ms       P-R-T Axes : 000 032 039 degrees     QTc Int : 422 ms    Sinus bradycardia  Otherwise normal ECG    When compared with ECG of 10-FEB-2021 13:47,  No significant change was found  Confirmed by Mingo Aceves MD ([id number]) on 8/21/2024 1:03:27 PM    Referred By: [email or web address]   SELF           Confirmed By:Mingo Aceves MD       ALLERGIES:  Penicillins    MEDICATIONS  Encounter Medications[3]    If female, birth control: post menopausa    6/17/2025, Neurology: "MOCA 21/30 today, mri reviewed w patient, patient endorses significant depression. Discussed w patient can refer to psych or neuropsych. Patient saw psych back in 2020 for depression.  Discussed w patient multifactorial nature of symptoms and if she feels depression significant impact, may be beneficial to evaluation and management. "    Mental Status Exam (MSE)  Constitutional  Vitals:  Most recent vital signs were reviewed.    There were no vitals filed for this visit.  There is no height or weight on file to calculate BMI.     General:   Well developed, appropriately dressed, appropriate hygiene     Musculoskeletal  Muscle Strength/Tone:   " "Appropriate, intact   Gait:   Appropriate, intact       Psychiatric  Behavior:  Calm, cooperative   Eye Contact:  Intact   Speech:  Appropriate rate/volume/tone; spontaneous   Mood:   "good"   Affect:  Appropriate to situation/content, mood congruent, reactive   Thought Process:  Linear and goal directed, organized, logical   Associations:  intact   Thought Content:  normal, no suicidality, no homicidality, delusions, or paranoia   Insight:  intact, has awareness of illness   Judgement: behavior is adequate to circumstances, age appropriate   Orientation:  grossly intact, person, place, situation, day of week, 7/11/2025   Memory: intact for content of interview, grossly intact, able to remember recent events- Yes, able to remember remote events- Yes, 1/3 with hints   Language: grossly intact, able to name, able to repeat   Attention Span & Concentration:  able to focus, completed tasks; "[id number]-u-[id number]-h ".   Fund of Knowledge:  intact and appropriate to age and level of education, familiar with aspects of current personal life, 3 of 4 recent presidents, could describe, but not name Biden     Relevant Elements of Neurological Exam: No Abnormal Involuntary movements, tremor, EPS, or TD  Assessment and Plan:       ICD-10-CM ICD-9-CM   1. Depression, unspecified depression type  F32.A 311   2. Cognitive changes  R41.89 799.59       IMPRESSION:  Evaluated for depression and memory issues as requested by neurology referral.  Conducted screening for depression, anxiety, and other psychiatric conditions.  Patient appears to be minimizing symptoms of depression and is dismissive of odd behaviors associated with recent behaviors surrounding internet scam.    Identified need for further assessment of recent tearful episodes and potential ongoing depression as reported by son at end of session..  Will evaluate need for potential adjustment of sertraline dosage based on reassessment.    TREATMENT RECOMMENDATIONS    DEPRESSION:  - " Continue sertraline 100 mg daily.    COGNITIVE CHANGES  - mgmt per Neurology.   - 8/5/2025- appt with neurology and neuropsych.  Will f/u.    LABWORK: reviewed 07/17/2025, Up to Date  - will order fasting CBC, TSH, and CMP at next visit.    FOLLOW-UP:  - Follow up on July 23rd at 8:30 AM with family member present for reassessment of depression symptoms, including tearful episodes reported by family.  - Will further explore incident involving potential internet scam.        RISK MANAGEMENT:      -- SUICIDAL IDEATION (current  as voiced during the assessment): DENIES      -- HOMICIDAL IDEATION (current  as voiced during the assessment): DENIES      -- NON-SUICIDAL SELF-INJURIOUS BEHAVIOR (current  to the episode/presentation): ABSENT      -- PERPETRATED VIOLENCE (current  to the episode/presentation): ABSENT     -- ACCESS TO FIREARMS: NO  -- FIREARM SAFETY: COUNSELED     - Counseling has been provided on gun safety - including proper storage and inherent risk.    INFORMED CONSENT & SHARED DECISION MAKING are the hallmark and bedrock of good clinical care, and as such have been employed and obtained, respectively, to the degree possible.    NOTE: discussed, to the extent possible, diagnosis, risks and benefits of proposed treatment (e.g., medication, therapy) vs alternative treatments vs no treatment, potential side effects of these treatments, and the inherent unpredictability of treatment.     ADVICE & COUNSELING:   - In cases of emergencies (e.g. SI/HI resulting in danger to self or others, functioning deteriorating to the level of grave disability), call 911 or [phone], or present to the emergency department for immediate assistance.   - Individuals should not operate a motor vehicle or heavy machinery if effects of medications or underlying symptoms/condition impair the ability to do so safely.   - FULLY comply with ANY/ALL medication as prescribed/instructed and report ANY/ALL suspected adverse effects to  appropriate health care providers.              ADD-ON PSYCHOTHERAPY:     [x] +33610 Add-On Psychotherapy: 30 minutes (range 16-37 minutes)  [] +19018 Add-On Psychotherapy: 45 minutes (range 38-52 minutes)  [] +05348 Add-On Psychotherapy: 60 minutes (range 53 minutes or greater)    Duration: 18 minutes    Primary Focus: memory     Modalities: supportive **  Techniques: active listening, clarification, empathic responses, psychoeducation **  Selection Criteria: effectiveness, responsiveness **  Outcome Monitoring: observation, self-report **  Participation: adequate **  [place]: accepting **  Progression: as anticipated **  Response: good **    Elisha German MD, [facility]  Consultation Liaison Psychiatry      This note was generated with the assistance of ambient listening technology. Verbal consent was obtained by the patient and accompanying visitor(s) for the recording of patient appointment to facilitate this note. I attest to having reviewed and edited the generated note for accuracy, though some syntax or spelling errors may persist. Please contact the author of this note for any clarification.           [1]   Past Medical History:  Diagnosis Date    AK (actinic keratosis)     Allergy     Seasonal    Anxiety     Arthritis of both knees 04/22/2015    Basal cell carcinoma     Basal cell carcinoma 10/06/2021    Right upper back    Cataract     Depression     Dizziness     Fibrocystic breast     Fracture of lateral malleolus     High cholesterol     Hypothyroidism due to acquired atrophy of thyroid     Nuclear sclerosis - Both Eyes 05/06/2013    Osteoarthritis 2012    [facility]    SVT (supraventricular tachycardia)    [2]   Past Surgical History:  Procedure Laterality Date    ANKLE FRACTURE SURGERY  2013    Taiwo Macias MD    BREAST BIOPSY Left     core bx, b9    CATARACT EXTRACTION W/  INTRAOCULAR LENS IMPLANT Right 12/19/2019    Procedure: EXTRACTION, CATARACT, WITH IOL INSERTION;  Surgeon: Aleyda Acosta MD;   Location: [facility] OR;  Service: Ophthalmology;  Laterality: Right;  LASER ASSISTED    CATARACT EXTRACTION W/  INTRAOCULAR LENS IMPLANT Left 01/16/2020    Procedure: EXTRACTION, CATARACT, WITH IOL INSERTION;  Surgeon: Aleyda Acosta MD;  Location: [facility] OR;  Service: Ophthalmology;  Laterality: Left;    COLONOSCOPY  08/21/2003    COLONOSCOPY N/A 05/06/2021    Procedure: COLONOSCOPY;  Surgeon: Lon Chapin MD;  Location: [facility] ENDO;  Service: Endoscopy;  Laterality: N/A;    ELBOW FRACTURE SURGERY  2004    Lang Daniels MD    ELBOW SURGERY  2004    Lang Daniels MD    EYE SURGERY  Cataract    FRACTURE SURGERY  Left ankle/ leg- left elbow/arm    fx arm      HUMERUS FRACTURE SURGERY  2004    Lang Daniels MD    HYSTERECTOMY      SKIN BIOPSY  2020    Renee Lopez MD @[facility]   [3]   Outpatient Encounter Medications as of 7/17/2025   Medication Sig Dispense Refill    atenoloL (TENORMIN) 100 MG tablet Take 1 tablet by mouth once daily 90 tablet 3    azelastine (ASTELIN) 137 mcg (0.1 %) nasal spray 2 sprays (274 mcg total) by Nasal route 2 (two) times daily. 30 mL 1    bimatoprost (LATISSE) 0.03 % ophthalmic solution Place 1 application  into both eyes every evening. Place one drop on applicator and apply evenly along the skin of the upper eyelid at base of eyelashes once daily at bedtime; repeat procedure for second eye (use a clean applicator). 5 mL 12    clobetasol 0.05% (TEMOVATE) 0.05 % Oint Apply topically 2 (two) times daily. 60 g 1    COVID-19 (COMIRNATY 2024-25, 12Y UP,,PF,) 30 mcg/0.3 mL IM vaccine (>/= 13 yo) Inject into the muscle. 0.3 mL 0    estradioL (ESTRACE) 0.01 % (0.1 mg/gram) vaginal cream Place 1 g vaginally once daily. 42.5 g 3    estradioL (ESTRACE) 2 MG tablet Take 1 tablet (2 mg total) by mouth once daily. 90 tablet 3    ibuprofen (ADVIL,MOTRIN) 200 MG tablet Take 800 mg by mouth daily as needed for Pain.      levothyroxine (EUTHYROX) 25 MCG tablet Take 1 tablet (25 mcg total) by mouth once daily.  90 tablet 3    magnesium oxide (MAG-OX) 400 mg (241.3 mg magnesium) tablet Take 1 tablet (400 mg total) by mouth once daily. 30 tablet 11    mupirocin (BACTROBAN) 2 % ointment Apply topically 2 (two) times daily as needed (Rash).      primidone (MYSOLINE) 50 MG Tab Take 1 tablet (50 mg total) by mouth every evening. Start taking 1/2 tablet at bedtime the first week 90 tablet 3    riboflavin, vitamin B2, 400 mg Tab Take 400 mg by mouth once daily. 30 tablet 0    rimegepant 75 mg odt Take 1 tablet (75 mg total) by mouth as needed for Migraine. Place ODT tablet on the tongue; alternatively the ODT tablet may be placed under the tongue 12 tablet 1    rosuvastatin (CRESTOR) 10 MG tablet Take 1 tablet (10 mg total) by mouth nightly. 90 tablet 3    sertraline (ZOLOFT) 100 MG tablet Take 1 tablet (100 mg total) by mouth once daily. 90 tablet 3    topiramate (TOPAMAX) 50 MG tablet Start 1/2 tab nightly for 1 week then increase to 1/2 tab BID, then after 2 weeks increase to 1 tab BID 60 tablet 11    triamcinolone acetonide 0.1% (KENALOG) 0.1 % cream Apply to affected areas of body BID prn rash. Do not use on face, underarms, or groin. 80 g 2    coenzyme Q10 100 mg capsule Take 1 capsule (100 mg total) by mouth once daily. (Patient not taking: Reported on 7/17/2025) 30 capsule 11    [DISCONTINUED] albuterol (PROVENTIL/VENTOLIN HFA) 90 mcg/actuation inhaler Inhale 2 puffs into the lungs every 4 (four) hours as needed for Wheezing or Shortness of Breath (cough). Rescue 18 g 0     No facility-administered encounter medications on file as of 7/17/2025.

## 2025-07-17 NOTE — Clinical Note
Please book patient  for 7/23   Time 0830  Length:        EP30   virtual  Pt aware.  Thank you, MM

## 2025-07-18 ENCOUNTER — TELEPHONE (OUTPATIENT)
Dept: NEUROLOGY | Facility: CLINIC | Age: 75
End: 2025-07-18
Payer: MEDICARE

## 2025-07-18 ENCOUNTER — OFFICE VISIT (OUTPATIENT)
Dept: FAMILY MEDICINE | Facility: CLINIC | Age: 75
End: 2025-07-18
Payer: MEDICARE

## 2025-07-18 VITALS
WEIGHT: 183.88 LBS | SYSTOLIC BLOOD PRESSURE: 116 MMHG | DIASTOLIC BLOOD PRESSURE: 60 MMHG | OXYGEN SATURATION: 98 % | HEART RATE: 53 BPM | BODY MASS INDEX: 28.8 KG/M2

## 2025-07-18 DIAGNOSIS — F32.A DEPRESSION, UNSPECIFIED DEPRESSION TYPE: Primary | ICD-10-CM

## 2025-07-18 DIAGNOSIS — L57.0 ACTINIC KERATOSIS: ICD-10-CM

## 2025-07-18 PROCEDURE — 99999 PR PBB SHADOW E&M-EST. PATIENT-LVL IV: CPT | Mod: PBBFAC,,, | Performed by: INTERNAL MEDICINE

## 2025-07-18 PROCEDURE — 99214 OFFICE O/P EST MOD 30 MIN: CPT | Mod: PBBFAC,PO | Performed by: INTERNAL MEDICINE

## 2025-07-18 NOTE — PROGRESS NOTES
Subjective     Viv De La Torre is a 75 y.o. old, female here for ear lesion.    74 y/o with PMH of HLD, SVT/PVC's, hypothyroidism, depression, ET, vestibular migraines     Here for f/u with concerns about a lesion on her left ear.  It has been there for years without changes. It sometimes itches or when rubbed or picked at will bleed some. Otherwise no recent change.    Since last visit she has seen neurology, neuropsych and psychiatry. F/u planned. MOCA 21/30.  She does not recall our last visit where she told me about how she had been scammed out of several thousand dollars by someone impersonating Kt Hernandez.  She continues to endorse depression and loneliness.    ROS  Medications     Outpatient Medications Marked as Taking for the 7/18/25 encounter (Office Visit) with Aravind Jimenez MD   Medication Sig Dispense Refill    atenoloL (TENORMIN) 100 MG tablet Take 1 tablet by mouth once daily 90 tablet 3    azelastine (ASTELIN) 137 mcg (0.1 %) nasal spray 2 sprays (274 mcg total) by Nasal route 2 (two) times daily. 30 mL 1    bimatoprost (LATISSE) 0.03 % ophthalmic solution Place 1 application  into both eyes every evening. Place one drop on applicator and apply evenly along the skin of the upper eyelid at base of eyelashes once daily at bedtime; repeat procedure for second eye (use a clean applicator). 5 mL 12    clobetasol 0.05% (TEMOVATE) 0.05 % Oint Apply topically 2 (two) times daily. 60 g 1    COVID-19 (COMIRNATY 2024-25, 12Y UP,,PF,) 30 mcg/0.3 mL IM vaccine (>/= 11 yo) Inject into the muscle. 0.3 mL 0    estradioL (ESTRACE) 0.01 % (0.1 mg/gram) vaginal cream Place 1 g vaginally once daily. 42.5 g 3    estradioL (ESTRACE) 2 MG tablet Take 1 tablet (2 mg total) by mouth once daily. 90 tablet 3    ibuprofen (ADVIL,MOTRIN) 200 MG tablet Take 800 mg by mouth daily as needed for Pain.      levothyroxine (EUTHYROX) 25 MCG tablet Take 1 tablet (25 mcg total) by mouth once daily. 90 tablet 3    magnesium oxide  (MAG-OX) 400 mg (241.3 mg magnesium) tablet Take 1 tablet (400 mg total) by mouth once daily. 30 tablet 11    mupirocin (BACTROBAN) 2 % ointment Apply topically 2 (two) times daily as needed (Rash).      primidone (MYSOLINE) 50 MG Tab Take 1 tablet (50 mg total) by mouth every evening. Start taking 1/2 tablet at bedtime the first week 90 tablet 3    riboflavin, vitamin B2, 400 mg Tab Take 400 mg by mouth once daily. 30 tablet 0    rimegepant 75 mg odt Take 1 tablet (75 mg total) by mouth as needed for Migraine. Place ODT tablet on the tongue; alternatively the ODT tablet may be placed under the tongue 12 tablet 1    rosuvastatin (CRESTOR) 10 MG tablet Take 1 tablet (10 mg total) by mouth nightly. 90 tablet 3    sertraline (ZOLOFT) 100 MG tablet Take 1 tablet (100 mg total) by mouth once daily. 90 tablet 3    topiramate (TOPAMAX) 50 MG tablet Start 1/2 tab nightly for 1 week then increase to 1/2 tab BID, then after 2 weeks increase to 1 tab BID 60 tablet 11    triamcinolone acetonide 0.1% (KENALOG) 0.1 % cream Apply to affected areas of body BID prn rash. Do not use on face, underarms, or groin. 80 g 2     Objective     /60   Pulse (!) 53   Wt 83.4 kg (183 lb 13.8 oz)   SpO2 98%   BMI 28.80 kg/m²   Physical Exam  Constitutional:       General: She is not in acute distress.     Appearance: Normal appearance. She is well-developed.   Skin:     Comments: Small left 1 cm raised dry skin lesion left upper ear   Neurological:      Mental Status: She is alert.       Assessment and Plan     Depression, unspecified depression type    Actinic keratosis      Continue f/u with specialists  Reassurance on skin lesion. Monitor and refer to Derm if needed.  We talked about socializing a bit more to help her with loneliness.  ___________________  Aravind Jimenez MD  Internal Medicine and Pediatrics

## 2025-07-18 NOTE — TELEPHONE ENCOUNTER
Attempts were made to contact the patient's daughter, Yuval, but all calls went directly to voicemail. I was able to reach the patient's son and asked him to relay a message for the daughter to return my call.

## 2025-07-23 ENCOUNTER — OFFICE VISIT (OUTPATIENT)
Dept: PSYCHIATRY | Facility: CLINIC | Age: 75
End: 2025-07-23
Payer: MEDICARE

## 2025-07-23 DIAGNOSIS — G31.84 MCI (MILD COGNITIVE IMPAIRMENT): ICD-10-CM

## 2025-07-23 DIAGNOSIS — F32.A DEPRESSION, UNSPECIFIED DEPRESSION TYPE: Primary | ICD-10-CM

## 2025-07-23 PROCEDURE — 98007 SYNCH AUDIO-VIDEO EST HI 40: CPT | Mod: 95,,, | Performed by: PSYCHIATRY & NEUROLOGY

## 2025-07-23 PROCEDURE — 90833 PSYTX W PT W E/M 30 MIN: CPT | Mod: 95,,, | Performed by: PSYCHIATRY & NEUROLOGY

## 2025-07-23 PROCEDURE — G2211 COMPLEX E/M VISIT ADD ON: HCPCS | Mod: 95,,, | Performed by: PSYCHIATRY & NEUROLOGY

## 2025-07-23 NOTE — Clinical Note
Please book patient  for 10/22   Time 0830  Length:        EP30   virtual  Pt aware.  Thank you, MM

## 2025-07-23 NOTE — PROGRESS NOTES
"Subjective:       Patient ID: Viv De La Torre is a 75 y.o. female.    PSYCHIATRY ENCOUNTER  7/23/2025      SERVICE: Geriatric  ENCOUNTER: subsequent    CHIEF COMPLAINT: Patient presents for follow-up regarding med mgmt of depression.    START TIME: 7/23/2025 8:39 AM  STOP TIME: 7/23/2025 9:10 AM  TOTAL ENCOUNTER TIME (in minutes): 48  TIME WITH PATIENT (in minutes): 31    -- PATIENT IDENTIFIERS: Viv De La Torre  [id number]  1950  75 y.o.  female  -- LOCATION OF PATIENT: community    -- MODE OF ARRIVAL: self-presented  -- PRESENT WITH PATIENT DURING SESSION: ALONE  -- SOURCES OF INFORMATION: PATIENT, EHR/chart  -- LOCATION OF ENCOUNTER PROVIDER: [place], [place]    -- ENCOUNTER PROVIDER: Elisha German MD       Reason for Encounter:   med mgmt    History of Present Illness:   6/17/2025, Neurology, Office Visit  "Got scammed during new years.   Per note in PCP chart as well:  Phone today (Yuval James [phone]) and discussed what has been happening the past few months.  She describes some of her mother's unusual behaviors the past few months. She started asking for money, not answering texts, displaying more confusion and memory issues. Her daughter came into town this past weekend and found that she had been scammed out of a lot of money (10k + cash and venmo) from [employer] a scammer who she thought was Kt Hernandez or someone who knew Kt Hernandez.     Daughter manages finances. 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     Patient herself recognizes she was scammed out of memory due to her depression.    07/17/2025, Today, Initial psych eval  75 y.o. female Referred to Psychiatry by Neurology due to concerns about memory and depression.  During evaluation, patient was evaluated  1 on 1 for majority of sessions.   Per patient report, depression as well controlled on sertraline.   Patient discussed being taken in by a scam and losing a significant amount of money but did not disclose signs/symptoms consistent with psychosis.  " Patient's son joined for the last 10 minutes of the session.   Son expressed concern about patient's behavior when family discovered the scam.     Patient was recently involved in a scam where she believed she was communicating with [employer] quarterback Kt Hernandez on the internet. This incident lasted approximately 5-6 months and resulted in the loss of about $10,000 through cash and Venmo transactions. During this time, she acted out of character and did things she later regretted significantly.  Per son, patient struggled to accept that she was not truly communicating/dating Kt Hernandez.  Currently, patient states she is aware that it was a scam.  Patient is dismissive of incident. States that she felt that it was fun.  Unclear if this was isolated incident.  Son could not give example other examples of impaired reality testing. Patient appears to be minimizing significance of event.    07/24/2025, Today, Interim Events:  At end of previous/initial encounter, patient's son reported concerns about her reaction when confronted about the scam. There were difficulties in convincing the patient that she was not communicating with the real Kt Hernandez. Patient seems to have some difficulty recalling or acknowledging the full extent of her emotional response to the revelation of the scam. Son also vocalized concern that mother has reported depressed mood recently.     Patient provided additional details of internet scam.  The scammer posed as Kt Hernandez, an . Communication occurred through emails and text messages, including the exchange of sexually explicit pictures. Patient was asked to send $5,000 under the pretense of joining a VIP club. This request raised suspicions with the patient's daughter, Yuval, who manages patient's finances    Patient is dismissive of children's concerns.  Although patient's reality testing is concerning for delusional beliefs in the context of scam, patient denies any current  signs/symptoms of psychosis outside of that specific context/incident.  During previous evaluation, son was unable to provide h/o psychosis outside of context of scam.    During previous appt, patient's son has reported observing tearful episodes in the patient. Patient acknowledges feeling sad at times but does not provide detailed information about the frequency or intensity of these feelings, and she denies clinically significant depression at present.   Patient may be minimizing symptoms/  Family members were unable to attend appt to provide collateral information.    Symptom Status: stable  Adverse Effects: denies  Compliance: yes       PSYCHIATRIC ROS: see HPI  Depression rating (0 = none, 10 = worst): 5  Reports: no depressed mood, no anhedonia, no amotivation, no hopelessness, no helplessness, no worthlessness  No SI/HI  Anxiety rating (0 = none, 10 = worst): 0  Reports: no generalized anxiety/worry, no panic attacks, no ruminations, no restlessness, not keyed up/on edge  Denies irritabiilty/mm tension  Sleep: intact, 8-10 hours qhs, restful  Energy: intact  Appetite: intact  Focus/Concentration: baseline  Bridget: NO  Psychosis: NO  Trauma: NO  Eating Disorder: none     Review of Systems   HENT:  Positive for nosebleeds (Pt associates with changing weather.).    All 12 systems otherwise negative.        Past Medical History[1]    Past Surgical History[2]     Past Medical, Surgical, Family and Social History: The patient's past medical, family and social history have been reviewed and updated as appropriate within the electronic medical record - see encounter notes.     >> SOURCES: patient, chart review      -- Pertinent Elements of the Past History:     Hx of Seizure: no  Hx of Significant Head Injury (e.g., Loss of Consciousness, Concussion, Coma): no     PAST PSYCHIATRIC HISTORY  Past Psychiatric Diagnoses:  depression  Inpatient Hospitalizations:  denies  Outpatient treatment:    - Dr. Resendez- 2000 -  2010  Suicide attempts: denies  SIB: denies  Violence: denies  Physical abuse: denies  Sexual abuse: denies     SUBSTANCE USE HISTORY  Nicotine: denies  Alcohol: 1 glass of wine q 1-2 weeks; denies h/o heavy use or w/d  Cannabis: last use >1 year, rare  Illicits: denies  Rehab/Detox: denies     FAMILY PSYCHIATRIC HISTORY  Sister: substance use  Denies h/o suicide in family     SOCIAL HISTORY  Housing status: patient lives alone*  Marital status: ; no partner  Education: some college; degree in [employer]  Employment Status: retired, self-employed landscaping company  Children:  2 daughters, 1 son  Jew:  none  :  none  Legal:  denies       -- Psychotropic Trials  detailed/expanded:     - sertaline since 2000    >> SCHEDULED AND PRN MEDS: reviewed/reconciled  see MEDCARD      Objective:       DIAGNOSTIC TESTING:     Wt Readings from Last 3 Encounters:   07/18/25 83.4 kg (183 lb 13.8 oz)   06/17/25 83.5 kg (184 lb)   06/13/25 84.7 kg (186 lb 11.7 oz)     BP Readings from Last 1 Encounters:   07/18/25 116/60     Pulse Readings from Last 1 Encounters:   07/18/25 (!) 53         Glu 85  8/20/2024  Li *   *  TSH 3.921  8/20/2024    HgA1c *   *  VPA *   *   FT4 *   *    Na 139  8/20/2024  CLZ *   *  WBC 6.99  8/20/2024    Cr 0.9  8/20/2024  ANC 3.5; 50.0;   8/20/2024   Hgb 13.3  8/20/2024     BUN 17  8/20/2024  Trop I *   *  HCT 39.7  8/20/2024     GFR >60.0  8/20/2024   CPK *   *    8/20/2024     Alb 3.6  8/20/2024   PRL *   *  B12 *   *     T Bili 0.3  8/20/2024  Chol 160  4/12/2023  B9 *   *    ALP 79  8/20/2024  TGs 165 (H)  4/12/2023  B1 *   *    AST 27  8/20/2024  HDL 54  4/12/2023  Vit D *   *     ALT 30  8/20/2024  LDL 73.0  4/12/2023  HIV Non-reactive  8/20/2024     INR *   *  Kaylen *   *   Hep C Non-reactive  8/20/2024    GGT *   *  Lip *   *  RPR *   *    MCV 95  8/20/2024   NH4 *   *  UPT *   *      PETH *   *  THC Negative  8/21/2024     "ETOH *   *  KATHERINE Negative  8/21/2024    EtG *   *  AMP Negative  8/21/2024    ALC *   *  OPI Negative  8/21/2024    BZO Negative  8/21/2024  MTD Negative  8/21/2024     BAR Negative  8/21/2024  BUP *   *    PCP Negative  8/21/2024  FEN *   *     Results for orders placed or performed during the hospital encounter of 08/20/24   EKG 12-lead    Collection Time: 08/20/24  8:33 PM   Result Value Ref Range    QRS Duration 96 ms    OHS QTC Calculation 422 ms    Narrative    Test Reason : R42,    Vent. Rate : 052 BPM     Atrial Rate : 052 BPM     P-R Int : 134 ms          QRS Dur : 096 ms      QT Int : 454 ms       P-R-T Axes : 000 032 039 degrees     QTc Int : 422 ms    Sinus bradycardia  Otherwise normal ECG    When compared with ECG of 10-FEB-2021 13:47,  No significant change was found  Confirmed by Mingo Aceves MD ([id number]) on 8/21/2024 1:03:27 PM    Referred By: NASIM   SELF           Confirmed By:Mingo Aceves MD       ALLERGIES:  Penicillins    MEDICATIONS  Encounter Medications[3]    If female, birth control: n/a      Mental Status Exam (MSE)  Constitutional  Vitals:  Most recent vital signs were reviewed.    There were no vitals filed for this visit.  There is no height or weight on file to calculate BMI.     General:   Well developed, appropriately dressed, appropriate hygiene     Musculoskeletal  Muscle Strength/Tone:   unable to assess    Gait:  unable to assess      Psychiatric  Behavior:  Calm, cooperative   Eye Contact:  Intact   Speech:  Appropriate rate/volume/tone; spontaneous   Mood:   "I'm happy"   Affect:  Appropriate to situation/content, mood congruent, reactive   Thought Process:  Linear and goal directed, organized, logical   Associations:  intact   Thought Content:  normal, no suicidality, no homicidality, delusions, or paranoia   Insight:  Limited insight into bizarre beliefs and behaviors associated with scam.   Judgement: Limited in regards to recent scam.   Orientation:  grossly intact "   Memory: intact for content of interview, grossly intact   Language: grossly intact, able to name   Attention Span & Concentration:  able to focus, completed tasks   Fund of Knowledge:  intact and appropriate to age and level of education, not tested, familiar with aspects of current personal life     Relevant Elements of Neurological Exam: unable to assess      Assessment and Plan:       ICD-10-CM ICD-9-CM   1. Depression, unspecified depression type  F32.A 311   2. MCI (mild cognitive impairment)  G31.84 331.83       IMPRESSION:  Assessed recent involvement in online [employer], resulting in financial and emotional impact.  Evaluated for potential mood symptoms, including tearfulness and sadness, as reported by family members.  Considered increasing sertraline dose to address possible depression, but pt declined.  Recommend talk therapy as adjunct to medication management for better overall treatment of mood symptoms.    TREATMENT RECOMMENDATIONS    DEPRESSIVE DISORDER:  - Continue sertraline 100 mg daily.  - Referred to individual therapy through [facility] (6-month waitlist).  - Provided information for finding community-based therapists.    MCI  - pt counseled to f/u with Neurology.  - reviewed recent progress notes  - tx mgmt per Neuro.    LABWORK: reviewed 07/24/2025 , Up to Date    FOLLOW-UP VISIT:  - Follow up in 3 months (October 22nd at 8:30 AM).  - Contact office if need to adjust appointment time or if concerns arise before next scheduled appointment.        RISK MANAGEMENT:      -- SUICIDAL IDEATION (current  as voiced during the assessment): DENIES      -- HOMICIDAL IDEATION (current  as voiced during the assessment): DENIES      -- NON-SUICIDAL SELF-INJURIOUS BEHAVIOR (current  to the episode/presentation): ABSENT      -- PERPETRATED VIOLENCE (current  to the episode/presentation): ABSENT     -- ACCESS TO FIREARMS: NO  -- FIREARM SAFETY: COUNSELED     - Counseling has been provided on gun safety -  including proper storage and inherent risk.    INFORMED CONSENT & SHARED DECISION MAKING are the hallmark and bedrock of good clinical care, and as such have been employed and obtained, respectively, to the degree possible.    NOTE: discussed, to the extent possible, diagnosis, risks and benefits of proposed treatment (e.g., medication, therapy) vs alternative treatments vs no treatment, potential side effects of these treatments, and the inherent unpredictability of treatment.     ADVICE & COUNSELING:   - In cases of emergencies (e.g. SI/HI resulting in danger to self or others, functioning deteriorating to the level of grave disability), call 911 or [phone], or present to the emergency department for immediate assistance.   - Individuals should not operate a motor vehicle or heavy machinery if effects of medications or underlying symptoms/condition impair the ability to do so safely.   - FULLY comply with ANY/ALL medication as prescribed/instructed and report ANY/ALL suspected adverse effects to appropriate health care providers.              ADD-ON PSYCHOTHERAPY:     [x] +17692 Add-On Psychotherapy: 30 minutes (range 16-37 minutes)  [] +70812 Add-On Psychotherapy: 45 minutes (range 38-52 minutes)  [] +62186 Add-On Psychotherapy: 60 minutes (range 53 minutes or greater)    Duration: 16 minutes    Primary Focus: online safety, bizarre beliefs     Modalities: behavior-modifying, supportive **  Techniques: active listening, clarification, empathic responses, motivational interviewing, psychoeducation **  Selection Criteria: effectiveness, responsiveness **  Outcome Monitoring: observation, self-report **  Participation: adequate **  [place]: dismissive **  Progression: as anticipated **  Response: good **      Elisha German MD, [facility]  Consultation Liaison Psychiatry      This note was generated with the assistance of ambient listening technology. Verbal consent was obtained by the patient and accompanying visitor(s)  for the recording of patient appointment to facilitate this note. I attest to having reviewed and edited the generated note for accuracy, though some syntax or spelling errors may persist. Please contact the author of this note for any clarification.                                   [1]   Past Medical History:  Diagnosis Date    AK (actinic keratosis)     Allergy     Seasonal    Anxiety     Arthritis of both knees 04/22/2015    Basal cell carcinoma     Basal cell carcinoma 10/06/2021    Right upper back    Cataract     Depression     Dizziness     Fibrocystic breast     Fracture of lateral malleolus     High cholesterol     Hypothyroidism due to acquired atrophy of thyroid     Nuclear sclerosis - Both Eyes 05/06/2013    Osteoarthritis 2012    [facility]    SVT (supraventricular tachycardia)    [2]   Past Surgical History:  Procedure Laterality Date    ANKLE FRACTURE SURGERY  2013    Taiwo Macias MD    BREAST BIOPSY Left     core bx, b9    CATARACT EXTRACTION W/  INTRAOCULAR LENS IMPLANT Right 12/19/2019    Procedure: EXTRACTION, CATARACT, WITH IOL INSERTION;  Surgeon: Aleyda Acosta MD;  Location: [facility] OR;  Service: Ophthalmology;  Laterality: Right;  LASER ASSISTED    CATARACT EXTRACTION W/  INTRAOCULAR LENS IMPLANT Left 01/16/2020    Procedure: EXTRACTION, CATARACT, WITH IOL INSERTION;  Surgeon: Aleyda Acosta MD;  Location: [facility] OR;  Service: Ophthalmology;  Laterality: Left;    COLONOSCOPY  08/21/2003    COLONOSCOPY N/A 05/06/2021    Procedure: COLONOSCOPY;  Surgeon: Lon Chapin MD;  Location: [facility];  Service: Endoscopy;  Laterality: N/A;    ELBOW FRACTURE SURGERY  2004    Lang Daniels MD    ELBOW SURGERY  2004    Lang Daniels MD    EYE SURGERY  Cataract    FRACTURE SURGERY  Left ankle/ leg- left elbow/arm    fx arm      HUMERUS FRACTURE SURGERY  2004    Lang Daniels MD    HYSTERECTOMY      SKIN BIOPSY  2020    Renee Lopez MD @[facility]   [3]   Outpatient Encounter Medications as of 7/23/2025    Medication Sig Dispense Refill    atenoloL (TENORMIN) 100 MG tablet Take 1 tablet by mouth once daily 90 tablet 3    azelastine (ASTELIN) 137 mcg (0.1 %) nasal spray 2 sprays (274 mcg total) by Nasal route 2 (two) times daily. 30 mL 1    bimatoprost (LATISSE) 0.03 % ophthalmic solution Place 1 application  into both eyes every evening. Place one drop on applicator and apply evenly along the skin of the upper eyelid at base of eyelashes once daily at bedtime; repeat procedure for second eye (use a clean applicator). 5 mL 12    clobetasol 0.05% (TEMOVATE) 0.05 % Oint Apply topically 2 (two) times daily. 60 g 1    coenzyme Q10 100 mg capsule Take 1 capsule (100 mg total) by mouth once daily. (Patient not taking: No sig reported) 30 capsule 11    COVID-19 (COMIRNATY 2024-25, 12Y UP,,PF,) 30 mcg/0.3 mL IM vaccine (>/= 11 yo) Inject into the muscle. 0.3 mL 0    estradioL (ESTRACE) 0.01 % (0.1 mg/gram) vaginal cream Place 1 g vaginally once daily. 42.5 g 3    estradioL (ESTRACE) 2 MG tablet Take 1 tablet (2 mg total) by mouth once daily. 90 tablet 3    ibuprofen (ADVIL,MOTRIN) 200 MG tablet Take 800 mg by mouth daily as needed for Pain.      levothyroxine (EUTHYROX) 25 MCG tablet Take 1 tablet (25 mcg total) by mouth once daily. 90 tablet 3    magnesium oxide (MAG-OX) 400 mg (241.3 mg magnesium) tablet Take 1 tablet (400 mg total) by mouth once daily. 30 tablet 11    mupirocin (BACTROBAN) 2 % ointment Apply topically 2 (two) times daily as needed (Rash).      primidone (MYSOLINE) 50 MG Tab Take 1 tablet (50 mg total) by mouth every evening. Start taking 1/2 tablet at bedtime the first week 90 tablet 3    riboflavin, vitamin B2, 400 mg Tab Take 400 mg by mouth once daily. 30 tablet 0    rimegepant 75 mg odt Take 1 tablet (75 mg total) by mouth as needed for Migraine. Place ODT tablet on the tongue; alternatively the ODT tablet may be placed under the tongue 12 tablet 1    rosuvastatin (CRESTOR) 10 MG tablet Take 1 tablet  (10 mg total) by mouth nightly. 90 tablet 3    sertraline (ZOLOFT) 100 MG tablet Take 1 tablet (100 mg total) by mouth once daily. 90 tablet 3    topiramate (TOPAMAX) 50 MG tablet Start 1/2 tab nightly for 1 week then increase to 1/2 tab BID, then after 2 weeks increase to 1 tab BID 60 tablet 11    triamcinolone acetonide 0.1% (KENALOG) 0.1 % cream Apply to affected areas of body BID prn rash. Do not use on face, underarms, or groin. 80 g 2     No facility-administered encounter medications on file as of 7/23/2025.

## 2025-07-28 DIAGNOSIS — Z00.00 ENCOUNTER FOR MEDICARE ANNUAL WELLNESS EXAM: ICD-10-CM

## 2025-07-30 ENCOUNTER — TELEPHONE (OUTPATIENT)
Dept: NEUROLOGY | Facility: CLINIC | Age: 75
End: 2025-07-30
Payer: MEDICARE

## 2025-07-30 NOTE — TELEPHONE ENCOUNTER
Per Dr. Huntley she would like for the patient to be seen after physic and when the daughter calls back schedule the patient in a hour slot

## 2025-08-04 ENCOUNTER — TELEPHONE (OUTPATIENT)
Dept: NEUROLOGY | Facility: CLINIC | Age: 75
End: 2025-08-04
Payer: MEDICARE

## 2025-08-04 NOTE — TELEPHONE ENCOUNTER
I spoke with the patient to reschedule f/u appointment with provider she states she's going to ask her pcp or physic why do she need to f/u with Dr. Huntley and give us a call back to get scheduled

## 2025-08-04 NOTE — TELEPHONE ENCOUNTER
I attempted to contact patient to get her reschedule with Dr. Huntley due to her daughter wanting her to be seen after her physic appointment no answer lvm

## 2025-08-05 ENCOUNTER — OFFICE VISIT (OUTPATIENT)
Dept: NEUROLOGY | Facility: CLINIC | Age: 75
End: 2025-08-05
Payer: MEDICARE

## 2025-08-05 DIAGNOSIS — F03.90 MAJOR NEUROCOGNITIVE DISORDER: Primary | ICD-10-CM

## 2025-08-05 DIAGNOSIS — R41.89 COGNITIVE IMPAIRMENT: ICD-10-CM

## 2025-08-05 PROCEDURE — 96133 NRPSYC TST EVAL PHYS/QHP EA: CPT | Mod: ,,, | Performed by: CLINICAL NEUROPSYCHOLOGIST

## 2025-08-05 PROCEDURE — 96138 PSYCL/NRPSYC TECH 1ST: CPT | Mod: ,,, | Performed by: CLINICAL NEUROPSYCHOLOGIST

## 2025-08-05 PROCEDURE — 96139 PSYCL/NRPSYC TST TECH EA: CPT | Mod: ,,, | Performed by: CLINICAL NEUROPSYCHOLOGIST

## 2025-08-05 PROCEDURE — 96132 NRPSYC TST EVAL PHYS/QHP 1ST: CPT | Mod: ,,, | Performed by: CLINICAL NEUROPSYCHOLOGIST

## 2025-08-05 PROCEDURE — 99499 UNLISTED E&M SERVICE: CPT | Mod: S$PBB,,, | Performed by: CLINICAL NEUROPSYCHOLOGIST

## 2025-08-05 PROCEDURE — 99999 PR PBB SHADOW E&M-EST. PATIENT-LVL I: CPT | Mod: PBBFAC,,, | Performed by: CLINICAL NEUROPSYCHOLOGIST

## 2025-08-05 PROCEDURE — 99211 OFF/OP EST MAY X REQ PHY/QHP: CPT | Mod: PBBFAC | Performed by: CLINICAL NEUROPSYCHOLOGIST

## 2025-08-10 ENCOUNTER — PATIENT MESSAGE (OUTPATIENT)
Dept: FAMILY MEDICINE | Facility: CLINIC | Age: 75
End: 2025-08-10
Payer: MEDICARE

## 2025-08-10 DIAGNOSIS — Z79.890 HORMONE REPLACEMENT THERAPY: ICD-10-CM

## 2025-08-13 RX ORDER — ESTRADIOL 2 MG/1
2 TABLET ORAL DAILY
Qty: 90 TABLET | Refills: 3 | Status: SHIPPED | OUTPATIENT
Start: 2025-08-13

## 2025-08-15 ENCOUNTER — PATIENT MESSAGE (OUTPATIENT)
Dept: OBSTETRICS AND GYNECOLOGY | Facility: CLINIC | Age: 75
End: 2025-08-15
Payer: MEDICARE

## 2025-08-18 ENCOUNTER — NURSE TRIAGE (OUTPATIENT)
Dept: ADMINISTRATIVE | Facility: CLINIC | Age: 75
End: 2025-08-18
Payer: MEDICARE

## 2025-08-18 ENCOUNTER — PATIENT OUTREACH (OUTPATIENT)
Facility: OTHER | Age: 75
End: 2025-08-18
Payer: MEDICARE

## 2025-08-18 ENCOUNTER — OCHSNER VIRTUAL EMERGENCY DEPARTMENT (OUTPATIENT)
Facility: CLINIC | Age: 75
End: 2025-08-18
Payer: MEDICARE

## 2025-08-18 DIAGNOSIS — M79.641 PAIN OF RIGHT HAND: Primary | ICD-10-CM

## 2025-08-19 ENCOUNTER — PATIENT OUTREACH (OUTPATIENT)
Facility: OTHER | Age: 75
End: 2025-08-19
Payer: MEDICARE

## 2025-08-19 ENCOUNTER — TELEPHONE (OUTPATIENT)
Dept: OBSTETRICS AND GYNECOLOGY | Facility: CLINIC | Age: 75
End: 2025-08-19
Payer: MEDICARE

## 2025-08-19 ENCOUNTER — HOSPITAL ENCOUNTER (EMERGENCY)
Facility: HOSPITAL | Age: 75
Discharge: HOME | End: 2025-08-19
Attending: EMERGENCY MEDICINE
Payer: MEDICARE

## 2025-08-19 VITALS
OXYGEN SATURATION: 99 % | HEIGHT: 68 IN | WEIGHT: 180 LBS | SYSTOLIC BLOOD PRESSURE: 110 MMHG | HEART RATE: 60 BPM | DIASTOLIC BLOOD PRESSURE: 64 MMHG | RESPIRATION RATE: 16 BRPM | TEMPERATURE: 98 F | BODY MASS INDEX: 27.28 KG/M2
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
## 2025-08-19 DIAGNOSIS — S00.83XA FACIAL CONTUSION, INITIAL ENCOUNTER: ICD-10-CM

## 2025-08-19 DIAGNOSIS — S60.221A CONTUSION OF RIGHT HAND, INITIAL ENCOUNTER: ICD-10-CM

## 2025-08-19 DIAGNOSIS — M25.511 RIGHT SHOULDER PAIN: ICD-10-CM

## 2025-08-19 DIAGNOSIS — S40.011A CONTUSION OF RIGHT SHOULDER, INITIAL ENCOUNTER: Primary | ICD-10-CM

## 2025-08-19 PROCEDURE — 25000003 PHARM REV CODE 250: Performed by: EMERGENCY MEDICINE

## 2025-08-19 PROCEDURE — 99283 EMERGENCY DEPT VISIT LOW MDM: CPT | Mod: 25

## 2025-08-19 RX ORDER — ACETAMINOPHEN 500 MG
1000 TABLET ORAL
Status: COMPLETED | OUTPATIENT
Start: 2025-08-19 | End: 2025-08-19

## 2025-08-19 RX ADMIN — ACETAMINOPHEN 1000 MG: 500 TABLET ORAL at 10:08

## 2025-08-20 ENCOUNTER — PATIENT OUTREACH (OUTPATIENT)
Facility: OTHER | Age: 75
End: 2025-08-20
Payer: MEDICARE

## 2025-08-20 PROBLEM — F03.90 MAJOR NEUROCOGNITIVE DISORDER: Status: ACTIVE | Noted: 2025-07-13

## 2025-08-21 ENCOUNTER — OFFICE VISIT (OUTPATIENT)
Dept: NEUROLOGY | Facility: CLINIC | Age: 75
End: 2025-08-21
Payer: MEDICARE

## 2025-08-21 DIAGNOSIS — F03.90 MAJOR NEUROCOGNITIVE DISORDER: Primary | ICD-10-CM

## 2025-08-21 PROCEDURE — 99499 UNLISTED E&M SERVICE: CPT | Mod: 95,,, | Performed by: CLINICAL NEUROPSYCHOLOGIST

## 2025-08-25 ENCOUNTER — PATIENT MESSAGE (OUTPATIENT)
Dept: NEUROLOGY | Facility: CLINIC | Age: 75
End: 2025-08-25
Payer: MEDICARE

## 2025-08-26 ENCOUNTER — PATIENT OUTREACH (OUTPATIENT)
Dept: NEUROLOGY | Facility: CLINIC | Age: 75
End: 2025-08-26
Payer: MEDICARE

## 2025-08-27 ENCOUNTER — PATIENT OUTREACH (OUTPATIENT)
Facility: OTHER | Age: 75
End: 2025-08-27
Payer: MEDICARE

## 2025-08-28 ENCOUNTER — TELEPHONE (OUTPATIENT)
Dept: OBSTETRICS AND GYNECOLOGY | Facility: CLINIC | Age: 75
End: 2025-08-28
Payer: MEDICARE

## 2025-09-05 ENCOUNTER — HOSPITAL ENCOUNTER (OUTPATIENT)
Dept: RADIOLOGY | Facility: HOSPITAL | Age: 75
Discharge: HOME | End: 2025-09-05
Attending: INTERNAL MEDICINE
Payer: MEDICARE

## 2025-09-05 DIAGNOSIS — Z12.31 ENCOUNTER FOR SCREENING MAMMOGRAM FOR MALIGNANT NEOPLASM OF BREAST: ICD-10-CM

## 2025-09-05 PROCEDURE — 77067 SCR MAMMO BI INCL CAD: CPT | Mod: TC

## (undated) DEVICE — GLOVE BIOGEL SKINSENSE PI 7.5

## (undated) DEVICE — SYR SLIP TIP 1CC

## (undated) DEVICE — CASSETTE INFINITI

## (undated) DEVICE — GLOVE BIOGEL SKINSENSE PI 6.5

## (undated) DEVICE — SOL BETADINE 5%

## (undated) DEVICE — Device